# Patient Record
Sex: MALE | Race: BLACK OR AFRICAN AMERICAN | NOT HISPANIC OR LATINO | Employment: FULL TIME | URBAN - METROPOLITAN AREA
[De-identification: names, ages, dates, MRNs, and addresses within clinical notes are randomized per-mention and may not be internally consistent; named-entity substitution may affect disease eponyms.]

---

## 2017-07-16 ENCOUNTER — HOSPITAL ENCOUNTER (EMERGENCY)
Facility: HOSPITAL | Age: 62
Discharge: HOME | End: 2017-07-16
Attending: EMERGENCY MEDICINE

## 2017-07-16 VITALS
OXYGEN SATURATION: 97 % | SYSTOLIC BLOOD PRESSURE: 146 MMHG | WEIGHT: 200 LBS | TEMPERATURE: 98 F | BODY MASS INDEX: 25.67 KG/M2 | HEIGHT: 74 IN | HEART RATE: 73 BPM | RESPIRATION RATE: 17 BRPM | DIASTOLIC BLOOD PRESSURE: 94 MMHG

## 2017-07-16 DIAGNOSIS — R06.02 SOB (SHORTNESS OF BREATH): ICD-10-CM

## 2017-07-16 DIAGNOSIS — J44.1 COPD EXACERBATION: Primary | ICD-10-CM

## 2017-07-16 DIAGNOSIS — R05.9 COUGH: ICD-10-CM

## 2017-07-16 DIAGNOSIS — R93.89 ABNORMAL CHEST X-RAY: ICD-10-CM

## 2017-07-16 LAB
ALBUMIN SERPL BCP-MCNC: 3.9 G/DL
ALP SERPL-CCNC: 67 U/L
ALT SERPL W/O P-5'-P-CCNC: 24 U/L
ANION GAP SERPL CALC-SCNC: 8 MMOL/L
AST SERPL-CCNC: 24 U/L
BASOPHILS # BLD AUTO: 0.03 K/UL
BASOPHILS NFR BLD: 0.5 %
BILIRUB SERPL-MCNC: 0.5 MG/DL
BNP SERPL-MCNC: 13 PG/ML
BUN SERPL-MCNC: 18 MG/DL
CALCIUM SERPL-MCNC: 9.7 MG/DL
CHLORIDE SERPL-SCNC: 108 MMOL/L
CO2 SERPL-SCNC: 26 MMOL/L
CREAT SERPL-MCNC: 1.2 MG/DL
DIFFERENTIAL METHOD: NORMAL
EOSINOPHIL # BLD AUTO: 0.4 K/UL
EOSINOPHIL NFR BLD: 5.7 %
ERYTHROCYTE [DISTWIDTH] IN BLOOD BY AUTOMATED COUNT: 13.3 %
EST. GFR  (AFRICAN AMERICAN): >60 ML/MIN/1.73 M^2
EST. GFR  (NON AFRICAN AMERICAN): >60 ML/MIN/1.73 M^2
GLUCOSE SERPL-MCNC: 102 MG/DL
HCT VFR BLD AUTO: 44.9 %
HGB BLD-MCNC: 14.7 G/DL
LYMPHOCYTES # BLD AUTO: 2.5 K/UL
LYMPHOCYTES NFR BLD: 40 %
MCH RBC QN AUTO: 29.2 PG
MCHC RBC AUTO-ENTMCNC: 32.7 %
MCV RBC AUTO: 89 FL
MONOCYTES # BLD AUTO: 0.5 K/UL
MONOCYTES NFR BLD: 7.3 %
NEUTROPHILS # BLD AUTO: 2.8 K/UL
NEUTROPHILS NFR BLD: 46.3 %
PLATELET # BLD AUTO: 261 K/UL
PMV BLD AUTO: 11.3 FL
POTASSIUM SERPL-SCNC: 4.4 MMOL/L
PROT SERPL-MCNC: 6.9 G/DL
RBC # BLD AUTO: 5.04 M/UL
SODIUM SERPL-SCNC: 142 MMOL/L
TROPONIN I SERPL DL<=0.01 NG/ML-MCNC: 0.02 NG/ML
WBC # BLD AUTO: 6.13 K/UL

## 2017-07-16 PROCEDURE — 80053 COMPREHEN METABOLIC PANEL: CPT

## 2017-07-16 PROCEDURE — 85025 COMPLETE CBC W/AUTO DIFF WBC: CPT

## 2017-07-16 PROCEDURE — 25000242 PHARM REV CODE 250 ALT 637 W/ HCPCS: Performed by: EMERGENCY MEDICINE

## 2017-07-16 PROCEDURE — 93010 ELECTROCARDIOGRAM REPORT: CPT | Mod: ,,, | Performed by: INTERNAL MEDICINE

## 2017-07-16 PROCEDURE — 93005 ELECTROCARDIOGRAM TRACING: CPT

## 2017-07-16 PROCEDURE — 96374 THER/PROPH/DIAG INJ IV PUSH: CPT

## 2017-07-16 PROCEDURE — 99284 EMERGENCY DEPT VISIT MOD MDM: CPT | Mod: 25

## 2017-07-16 PROCEDURE — 83880 ASSAY OF NATRIURETIC PEPTIDE: CPT

## 2017-07-16 PROCEDURE — 94640 AIRWAY INHALATION TREATMENT: CPT

## 2017-07-16 PROCEDURE — 84484 ASSAY OF TROPONIN QUANT: CPT

## 2017-07-16 PROCEDURE — 63600175 PHARM REV CODE 636 W HCPCS: Performed by: NURSE PRACTITIONER

## 2017-07-16 RX ORDER — BUDESONIDE 0.5 MG/2ML
0.5 INHALANT ORAL EVERY 12 HOURS
Qty: 120 ML | Refills: 0 | Status: SHIPPED | OUTPATIENT
Start: 2017-07-16 | End: 2018-01-01 | Stop reason: CLARIF

## 2017-07-16 RX ORDER — IPRATROPIUM BROMIDE AND ALBUTEROL SULFATE 2.5; .5 MG/3ML; MG/3ML
3 SOLUTION RESPIRATORY (INHALATION) EVERY 6 HOURS PRN
Qty: 20 VIAL | Refills: 0 | Status: SHIPPED | OUTPATIENT
Start: 2017-07-16 | End: 2018-01-01 | Stop reason: CLARIF

## 2017-07-16 RX ORDER — IPRATROPIUM BROMIDE AND ALBUTEROL SULFATE 2.5; .5 MG/3ML; MG/3ML
3 SOLUTION RESPIRATORY (INHALATION)
Status: COMPLETED | OUTPATIENT
Start: 2017-07-16 | End: 2017-07-16

## 2017-07-16 RX ORDER — METHYLPREDNISOLONE SOD SUCC 125 MG
125 VIAL (EA) INJECTION
Status: COMPLETED | OUTPATIENT
Start: 2017-07-16 | End: 2017-07-16

## 2017-07-16 RX ORDER — ALBUTEROL SULFATE 90 UG/1
1-2 AEROSOL, METERED RESPIRATORY (INHALATION) EVERY 6 HOURS PRN
Qty: 1 INHALER | Refills: 0 | Status: ON HOLD | OUTPATIENT
Start: 2017-07-16 | End: 2018-01-01

## 2017-07-16 RX ADMIN — IPRATROPIUM BROMIDE AND ALBUTEROL SULFATE 3 ML: .5; 3 SOLUTION RESPIRATORY (INHALATION) at 12:07

## 2017-07-16 RX ADMIN — METHYLPREDNISOLONE SODIUM SUCCINATE 125 MG: 125 INJECTION, POWDER, FOR SOLUTION INTRAMUSCULAR; INTRAVENOUS at 01:07

## 2017-07-16 NOTE — ED PROVIDER NOTES
Encounter Date: 7/16/2017       History     Chief Complaint   Patient presents with    Cough     cough at night with increased SOB.  Hx of emphysema.  Denies fevers     61-year-old male past medical history of COPD, hypertension, emphysema is here for shortness of breath.  The patient reports that he noticed worsening shortness of breath today, and he is out of his home inhalers.  He reports that he has been having to use his inhalers more frequently than usual.  He reports a nonproductive cough.  No fever/chills, sore throat, nasal congestion, chest pain, abdominal pain, vomiting, or rash.  No known sick contacts.  No home oxygen use.  He reports that he does still occasionally smokes.  He reports his cough is worse at night.  No relieving factors.      The history is provided by the patient.   Shortness of Breath   This is a new problem. The average episode lasts 1 day. The problem occurs continuously.The current episode started 3 to 5 hours ago. The problem has not changed since onset.Associated symptoms include cough and wheezing. Pertinent negatives include no fever, no headaches, no rhinorrhea, no sore throat, no sputum production, no hemoptysis, no orthopnea, no chest pain, no vomiting, no abdominal pain, no rash and no leg swelling. The problem's precipitants include smoke. He has tried beta-agonist inhalers for the symptoms. The treatment provided no relief. He has had prior hospitalizations. He has had prior ED visits. He has had prior ICU admissions. Associated medical issues include COPD and chronic lung disease. Associated medical issues do not include CAD, heart failure or past MI.     Review of patient's allergies indicates:  No Known Allergies  Past Medical History:   Diagnosis Date    COPD (chronic obstructive pulmonary disease)     Hypertension     Reactive airway disease 12/2/2014     History reviewed. No pertinent surgical history.  Family History   Problem Relation Age of Onset    Heart  disease Mother     Heart disease Father      Social History   Substance Use Topics    Smoking status: Current Every Day Smoker     Packs/day: 0.50     Years: 40.00     Types: Cigarettes    Smokeless tobacco: Never Used    Alcohol use No     Review of Systems   Constitutional: Negative for activity change, chills and fever.   HENT: Negative for congestion, rhinorrhea and sore throat.    Respiratory: Positive for cough, shortness of breath and wheezing. Negative for hemoptysis, sputum production and chest tightness.    Cardiovascular: Negative for chest pain, orthopnea and leg swelling.   Gastrointestinal: Negative for abdominal pain and vomiting.   Genitourinary: Negative for dysuria.   Musculoskeletal: Negative for back pain.   Skin: Negative for rash.   Allergic/Immunologic: Negative for immunocompromised state.   Neurological: Negative for headaches.   Psychiatric/Behavioral: Negative for confusion.       Physical Exam     Initial Vitals [07/16/17 1218]   BP Pulse Resp Temp SpO2   (!) 155/96 84 18 97.6 °F (36.4 °C) 98 %      MAP       115.67         Physical Exam    Nursing note and vitals reviewed.  Constitutional: Vital signs are normal. He appears well-developed and well-nourished. He is active and cooperative. He is easily aroused.  Non-toxic appearance. He does not have a sickly appearance. He does not appear ill. No distress.   HENT:   Head: Normocephalic and atraumatic.   Mouth/Throat: Uvula is midline and oropharynx is clear and moist.   Eyes: Conjunctivae are normal.   Neck: Normal range of motion.   Cardiovascular: Normal rate, regular rhythm and normal heart sounds.   Pulses:       Radial pulses are 2+ on the right side, and 2+ on the left side.   Pulmonary/Chest: Effort normal. No accessory muscle usage. No respiratory distress. He has wheezes (diffuse inspiratory and expiratory).   Abdominal: Soft. Normal appearance and bowel sounds are normal. There is no tenderness. There is no guarding.    Musculoskeletal:        Right lower leg: Normal.        Left lower leg: Normal.   Neurological: He is alert, oriented to person, place, and time and easily aroused. GCS eye subscore is 4. GCS verbal subscore is 5. GCS motor subscore is 6.   Skin: Skin is warm, dry and intact. No bruising and no rash noted.   Psychiatric: He has a normal mood and affect. His speech is normal and behavior is normal. Judgment and thought content normal. Cognition and memory are normal.         ED Course   Procedures  Labs Reviewed   CBC W/ AUTO DIFFERENTIAL   B-TYPE NATRIURETIC PEPTIDE   COMPREHENSIVE METABOLIC PANEL   TROPONIN I         Imaging Results          X-Ray Chest PA And Lateral (Final result)  Result time 07/16/17 13:02:54    Final result by Handy Rayo DO (07/16/17 13:02:54)                 Impression:      See above      Electronically signed by: HANDY RAYO DO  Date:     07/16/17  Time:    13:02              Narrative:    PA and lateral views of the chest    Comparison: 12/13/2015    Results:    Sub-centimeter nodule opacity overlies the left lower lobe overlapping the sixth rib anteriorly which may represent artifact from superimposed structures. Nipple shadow or lung nodule not excluded. This could be further evaluated with repeat views with nipple marker in place. There is no large lung consolidation. No pleural effusion or pneumothorax. Continued atherosclerotic aorta. Size within normal limits. Visualized osseous structures grossly intact.                                   Medical Decision Making:   Initial Assessment:   61-year-old male here for shortness of breath.  He does have a history of COPD and emphysema.  He reports a productive cough which is worse at night.  No fever/chills, chest pain, palpitations, hemoptysis, abdominal pain, vomiting, or rash.  The patient appears well, nontoxic.  Vital stable.  Diffuse expiratory and expiratory wheezing bilaterally.  Comfortable work of breathing.  Heart rate  regular rate and rhythm.  Differential Diagnosis:   COPD exacerbation, pneumonia, CHF, arrhythmia, STEMI, STEMI, worsening emphysema, electrolyte derangement, dehydration, or pleural effusion  Clinical Tests:   Lab Tests: Ordered and Reviewed  Radiological Study: Ordered and Reviewed  Medical Tests: Reviewed and Ordered  ED Management:  Labs, EKG, chest x-ray, DuoNeb, IV Solu-Medrol  X-ray negative for infiltrate.  There is a possible subcentimeter nodule overlying the left lower lobe which was discussed with the patient.  I advised follow up with [facility].  Patient reports he feels much better after his DuoNeb treatment.  I feel the patient's symptoms were due to the mild COPD exacerbation.  There is no indication for antibiotics.  I will prescribe him his home inhalers and add DuoNeb solutions to use with his nebulizer.  I reviewed strict return precautions.  The patient verbalized understanding, compliance, and agreement with the treatment plan.  He feels comfortable with the discharge.   R axis DuoNeb solution, Pulmicort, and albuterol.                 ED Course     Clinical Impression:   The primary encounter diagnosis was COPD exacerbation. Diagnoses of Cough, SOB (shortness of breath), and Abnormal chest x-ray were also pertinent to this visit.                           Jennifer Michel, TEGAN  07/16/17 1527

## 2017-07-16 NOTE — ED NOTES
Pt reports that he has emphysema and has been SOB since this morning. Usually uses an inhaler, but states that he ran out and does not have a PCP. Denies CP, N/V, headache, and vision changes. Pt appears in mild respiratory distress. O2 sat on RA is 98%.    APPEARANCE: Alert, oriented.  CARDIAC: Normal rate and rhythm.  PERIPHERAL VASCULAR: peripheral pulses present. Normal cap refill. No edema. Warm to touch.    RESPIRATORY:SOB, mild respiratory distress  GASTRO: soft, no tenderness, no abdominal distention.  MUSC: Full ROM. No bony tenderness or soft tissue tenderness. No obvious deformity.  SKIN: Skin is warm and dry, normal skin turgor.  NEURO: 5/5 strength major flexors/extensors bilaterally. [place] coma scale: eyes open spontaneously-4, oriented & converses-5, obeys commands-6. No neurological abnormalities.   MENTAL STATUS: awake, alert and aware of environment.

## 2018-01-01 ENCOUNTER — HOSPITAL ENCOUNTER (EMERGENCY)
Facility: HOSPITAL | Age: 63
Discharge: HOME | End: 2018-12-31
Attending: EMERGENCY MEDICINE
Payer: MEDICAID

## 2018-01-01 ENCOUNTER — CLINICAL SUPPORT (OUTPATIENT)
Dept: SMOKING CESSATION | Facility: CLINIC | Age: 63
End: 2018-01-01
Payer: COMMERCIAL

## 2018-01-01 ENCOUNTER — HOSPITAL ENCOUNTER (INPATIENT)
Facility: HOSPITAL | Age: 63
LOS: 7 days | Discharge: HOME | DRG: 189 | End: 2018-10-18
Attending: EMERGENCY MEDICINE | Admitting: HOSPITALIST
Payer: MEDICAID

## 2018-01-01 ENCOUNTER — HOSPITAL ENCOUNTER (EMERGENCY)
Facility: HOSPITAL | Age: 63
Discharge: HOME | End: 2018-08-12
Attending: EMERGENCY MEDICINE
Payer: MEDICAID

## 2018-01-01 ENCOUNTER — TELEPHONE (OUTPATIENT)
Dept: PHARMACY | Facility: CLINIC | Age: 63
End: 2018-01-01

## 2018-01-01 ENCOUNTER — HOSPITAL ENCOUNTER (INPATIENT)
Facility: HOSPITAL | Age: 63
LOS: 1 days | Discharge: HOME | DRG: 190 | End: 2018-06-26
Attending: EMERGENCY MEDICINE | Admitting: HOSPITALIST
Payer: MEDICAID

## 2018-01-01 VITALS
SYSTOLIC BLOOD PRESSURE: 173 MMHG | WEIGHT: 184.5 LBS | OXYGEN SATURATION: 99 % | RESPIRATION RATE: 16 BRPM | DIASTOLIC BLOOD PRESSURE: 93 MMHG | HEIGHT: 72 IN | TEMPERATURE: 99 F | BODY MASS INDEX: 24.99 KG/M2 | HEART RATE: 91 BPM

## 2018-01-01 VITALS
SYSTOLIC BLOOD PRESSURE: 141 MMHG | OXYGEN SATURATION: 93 % | HEART RATE: 97 BPM | TEMPERATURE: 97 F | RESPIRATION RATE: 20 BRPM | HEIGHT: 74 IN | BODY MASS INDEX: 22.63 KG/M2 | DIASTOLIC BLOOD PRESSURE: 98 MMHG | WEIGHT: 176.38 LBS

## 2018-01-01 VITALS
WEIGHT: 190 LBS | BODY MASS INDEX: 24.38 KG/M2 | HEART RATE: 95 BPM | OXYGEN SATURATION: 94 % | TEMPERATURE: 98 F | SYSTOLIC BLOOD PRESSURE: 127 MMHG | RESPIRATION RATE: 20 BRPM | HEIGHT: 74 IN | DIASTOLIC BLOOD PRESSURE: 93 MMHG

## 2018-01-01 VITALS
OXYGEN SATURATION: 98 % | HEIGHT: 74 IN | TEMPERATURE: 100 F | BODY MASS INDEX: 26.31 KG/M2 | SYSTOLIC BLOOD PRESSURE: 140 MMHG | HEART RATE: 102 BPM | WEIGHT: 205 LBS | DIASTOLIC BLOOD PRESSURE: 88 MMHG | RESPIRATION RATE: 16 BRPM

## 2018-01-01 DIAGNOSIS — Z72.0 TOBACCO ABUSE: ICD-10-CM

## 2018-01-01 DIAGNOSIS — J44.1 CHRONIC OBSTRUCTIVE PULMONARY DISEASE WITH ACUTE EXACERBATION: ICD-10-CM

## 2018-01-01 DIAGNOSIS — F17.210 CIGARETTE SMOKER: Primary | ICD-10-CM

## 2018-01-01 DIAGNOSIS — J44.1 COPD EXACERBATION: Primary | ICD-10-CM

## 2018-01-01 DIAGNOSIS — J45.901 REACTIVE AIRWAY DISEASE WITH ACUTE EXACERBATION, UNSPECIFIED ASTHMA SEVERITY, UNSPECIFIED WHETHER PERSISTENT: Chronic | ICD-10-CM

## 2018-01-01 DIAGNOSIS — R06.02 SOB (SHORTNESS OF BREATH): ICD-10-CM

## 2018-01-01 DIAGNOSIS — I21.4 NSTEMI (NON-ST ELEVATED MYOCARDIAL INFARCTION): ICD-10-CM

## 2018-01-01 DIAGNOSIS — R06.02 SOB (SHORTNESS OF BREATH): Primary | ICD-10-CM

## 2018-01-01 DIAGNOSIS — R07.9 CHEST PAIN IN ADULT: ICD-10-CM

## 2018-01-01 DIAGNOSIS — J02.0 STREP PHARYNGITIS: Primary | ICD-10-CM

## 2018-01-01 LAB
ALBUMIN SERPL BCP-MCNC: 4 G/DL
ALBUMIN SERPL BCP-MCNC: 4.1 G/DL
ALBUMIN SERPL BCP-MCNC: 4.2 G/DL
ALLENS TEST: ABNORMAL
ALP SERPL-CCNC: 65 U/L
ALP SERPL-CCNC: 68 U/L
ALP SERPL-CCNC: 71 U/L
ALT SERPL W/O P-5'-P-CCNC: 14 U/L
ALT SERPL W/O P-5'-P-CCNC: 18 U/L
ALT SERPL W/O P-5'-P-CCNC: 21 U/L
ANION GAP SERPL CALC-SCNC: 10 MMOL/L
ANION GAP SERPL CALC-SCNC: 13 MMOL/L
ANION GAP SERPL CALC-SCNC: 6 MMOL/L
ANION GAP SERPL CALC-SCNC: 6 MMOL/L
ANION GAP SERPL CALC-SCNC: 9 MMOL/L
AST SERPL-CCNC: 16 U/L
AST SERPL-CCNC: 21 U/L
AST SERPL-CCNC: 22 U/L
BACTERIA BLD CULT: NORMAL
BACTERIA BLD CULT: NORMAL
BACTERIA SPEC AEROBE CULT: NORMAL
BACTERIA SPEC AEROBE CULT: NORMAL
BASOPHILS # BLD AUTO: 0.01 K/UL
BASOPHILS # BLD AUTO: 0.02 K/UL
BASOPHILS # BLD AUTO: 0.03 K/UL
BASOPHILS # BLD AUTO: 0.03 K/UL
BASOPHILS # BLD AUTO: 0.04 K/UL
BASOPHILS NFR BLD: 0 %
BASOPHILS NFR BLD: 0.1 %
BASOPHILS NFR BLD: 0.2 %
BASOPHILS NFR BLD: 0.3 %
BASOPHILS NFR BLD: 0.4 %
BASOPHILS NFR BLD: 0.5 %
BILIRUB SERPL-MCNC: 0.3 MG/DL
BILIRUB SERPL-MCNC: 0.4 MG/DL
BILIRUB SERPL-MCNC: 0.6 MG/DL
BNP SERPL-MCNC: 14 PG/ML
BNP SERPL-MCNC: 15 PG/ML
BNP SERPL-MCNC: 31 PG/ML
BUN SERPL-MCNC: 11 MG/DL
BUN SERPL-MCNC: 12 MG/DL
BUN SERPL-MCNC: 13 MG/DL
BUN SERPL-MCNC: 24 MG/DL
BUN SERPL-MCNC: 24 MG/DL
BUN SERPL-MCNC: 28 MG/DL
BUN SERPL-MCNC: 28 MG/DL
BUN SERPL-MCNC: 34 MG/DL
CALCIUM SERPL-MCNC: 10.1 MG/DL
CALCIUM SERPL-MCNC: 10.2 MG/DL
CALCIUM SERPL-MCNC: 10.3 MG/DL
CALCIUM SERPL-MCNC: 10.3 MG/DL
CALCIUM SERPL-MCNC: 10.5 MG/DL
CALCIUM SERPL-MCNC: 10.5 MG/DL
CALCIUM SERPL-MCNC: 9.6 MG/DL
CALCIUM SERPL-MCNC: 9.8 MG/DL
CHLORIDE SERPL-SCNC: 105 MMOL/L
CHLORIDE SERPL-SCNC: 106 MMOL/L
CHLORIDE SERPL-SCNC: 107 MMOL/L
CHLORIDE SERPL-SCNC: 95 MMOL/L
CHLORIDE SERPL-SCNC: 96 MMOL/L
CHLORIDE SERPL-SCNC: 98 MMOL/L
CO2 SERPL-SCNC: 21 MMOL/L
CO2 SERPL-SCNC: 24 MMOL/L
CO2 SERPL-SCNC: 26 MMOL/L
CO2 SERPL-SCNC: 29 MMOL/L
CO2 SERPL-SCNC: 32 MMOL/L
CO2 SERPL-SCNC: 34 MMOL/L
CO2 SERPL-SCNC: 34 MMOL/L
CO2 SERPL-SCNC: 35 MMOL/L
CREAT SERPL-MCNC: 0.8 MG/DL
CREAT SERPL-MCNC: 1.1 MG/DL
CREAT SERPL-MCNC: 1.2 MG/DL
CREAT SERPL-MCNC: 1.3 MG/DL
DELSYS: ABNORMAL
DEPRECATED S PYO AG THROAT QL EIA: NEGATIVE
DIASTOLIC DYSFUNCTION: YES
DIFFERENTIAL METHOD: ABNORMAL
EOSINOPHIL # BLD AUTO: 0 K/UL
EOSINOPHIL # BLD AUTO: 0.1 K/UL
EOSINOPHIL # BLD AUTO: 0.2 K/UL
EOSINOPHIL # BLD AUTO: 0.6 K/UL
EOSINOPHIL # BLD AUTO: 0.9 K/UL
EOSINOPHIL NFR BLD: 0 %
EOSINOPHIL NFR BLD: 0.4 %
EOSINOPHIL NFR BLD: 11.7 %
EOSINOPHIL NFR BLD: 2.7 %
EOSINOPHIL NFR BLD: 9.6 %
EP: 5
ERYTHROCYTE [DISTWIDTH] IN BLOOD BY AUTOMATED COUNT: 13.5 %
ERYTHROCYTE [DISTWIDTH] IN BLOOD BY AUTOMATED COUNT: 13.5 %
ERYTHROCYTE [DISTWIDTH] IN BLOOD BY AUTOMATED COUNT: 13.7 %
ERYTHROCYTE [DISTWIDTH] IN BLOOD BY AUTOMATED COUNT: 13.7 %
ERYTHROCYTE [DISTWIDTH] IN BLOOD BY AUTOMATED COUNT: 13.8 %
ERYTHROCYTE [DISTWIDTH] IN BLOOD BY AUTOMATED COUNT: 13.9 %
ERYTHROCYTE [DISTWIDTH] IN BLOOD BY AUTOMATED COUNT: 14 %
ERYTHROCYTE [DISTWIDTH] IN BLOOD BY AUTOMATED COUNT: 14 %
ERYTHROCYTE [SEDIMENTATION RATE] IN BLOOD BY WESTERGREN METHOD: 4 MM/H
EST. GFR  (AFRICAN AMERICAN): >60 ML/MIN/1.73 M^2
EST. GFR  (NON AFRICAN AMERICAN): 59 ML/MIN/1.73 M^2
EST. GFR  (NON AFRICAN AMERICAN): >60 ML/MIN/1.73 M^2
ESTIMATED PA SYSTOLIC PRESSURE: 34.71
FIO2: 40
FLOW: 3
FLUAV AG SPEC QL IA: NEGATIVE
FLUBV AG SPEC QL IA: NEGATIVE
GLUCOSE SERPL-MCNC: 102 MG/DL
GLUCOSE SERPL-MCNC: 102 MG/DL
GLUCOSE SERPL-MCNC: 117 MG/DL
GLUCOSE SERPL-MCNC: 131 MG/DL
GLUCOSE SERPL-MCNC: 132 MG/DL
GLUCOSE SERPL-MCNC: 144 MG/DL
GLUCOSE SERPL-MCNC: 165 MG/DL
GLUCOSE SERPL-MCNC: 169 MG/DL
GRAM STN SPEC: NORMAL
HCO3 UR-SCNC: 24.6 MMOL/L (ref 24–28)
HCO3 UR-SCNC: 25.8 MMOL/L (ref 24–28)
HCO3 UR-SCNC: 26.8 MMOL/L (ref 24–28)
HCO3 UR-SCNC: 28.7 MMOL/L (ref 24–28)
HCT VFR BLD AUTO: 46.2 %
HCT VFR BLD AUTO: 46.4 %
HCT VFR BLD AUTO: 46.7 %
HCT VFR BLD AUTO: 48.6 %
HCT VFR BLD AUTO: 49.5 %
HCT VFR BLD AUTO: 50.2 %
HCT VFR BLD AUTO: 50.9 %
HCT VFR BLD AUTO: 51.5 %
HGB BLD-MCNC: 15.3 G/DL
HGB BLD-MCNC: 15.4 G/DL
HGB BLD-MCNC: 15.4 G/DL
HGB BLD-MCNC: 15.6 G/DL
HGB BLD-MCNC: 16.3 G/DL
HGB BLD-MCNC: 16.3 G/DL
HGB BLD-MCNC: 16.4 G/DL
HGB BLD-MCNC: 16.6 G/DL
INR PPP: 0.9
IP: 10
LACTATE SERPL-SCNC: 1 MMOL/L
LYMPHOCYTES # BLD AUTO: 0.5 K/UL
LYMPHOCYTES # BLD AUTO: 0.7 K/UL
LYMPHOCYTES # BLD AUTO: 0.7 K/UL
LYMPHOCYTES # BLD AUTO: 0.9 K/UL
LYMPHOCYTES # BLD AUTO: 1.3 K/UL
LYMPHOCYTES # BLD AUTO: 1.9 K/UL
LYMPHOCYTES # BLD AUTO: 2.5 K/UL
LYMPHOCYTES # BLD AUTO: 3.7 K/UL
LYMPHOCYTES NFR BLD: 2.1 %
LYMPHOCYTES NFR BLD: 22.4 %
LYMPHOCYTES NFR BLD: 31.1 %
LYMPHOCYTES NFR BLD: 32.7 %
LYMPHOCYTES NFR BLD: 4.3 %
LYMPHOCYTES NFR BLD: 6.6 %
LYMPHOCYTES NFR BLD: 6.9 %
LYMPHOCYTES NFR BLD: 8.6 %
MAGNESIUM SERPL-MCNC: 2 MG/DL
MAGNESIUM SERPL-MCNC: 2.1 MG/DL
MAGNESIUM SERPL-MCNC: 2.3 MG/DL
MAGNESIUM SERPL-MCNC: 2.6 MG/DL
MAGNESIUM SERPL-MCNC: 2.7 MG/DL
MCH RBC QN AUTO: 28.7 PG
MCH RBC QN AUTO: 28.8 PG
MCH RBC QN AUTO: 28.9 PG
MCH RBC QN AUTO: 29.1 PG
MCH RBC QN AUTO: 29.1 PG
MCH RBC QN AUTO: 29.3 PG
MCH RBC QN AUTO: 29.3 PG
MCH RBC QN AUTO: 29.6 PG
MCHC RBC AUTO-ENTMCNC: 31.8 G/DL
MCHC RBC AUTO-ENTMCNC: 32.1 G/DL
MCHC RBC AUTO-ENTMCNC: 32.5 G/DL
MCHC RBC AUTO-ENTMCNC: 32.6 G/DL
MCHC RBC AUTO-ENTMCNC: 32.8 G/DL
MCHC RBC AUTO-ENTMCNC: 32.9 G/DL
MCHC RBC AUTO-ENTMCNC: 33.2 G/DL
MCHC RBC AUTO-ENTMCNC: 33.3 G/DL
MCV RBC AUTO: 87 FL
MCV RBC AUTO: 88 FL
MCV RBC AUTO: 89 FL
MCV RBC AUTO: 90 FL
MCV RBC AUTO: 90 FL
MCV RBC AUTO: 91 FL
MITRAL VALVE MOBILITY: NORMAL
MODE: ABNORMAL
MODE: ABNORMAL
MONOCYTES # BLD AUTO: 0.3 K/UL
MONOCYTES # BLD AUTO: 0.4 K/UL
MONOCYTES # BLD AUTO: 0.5 K/UL
MONOCYTES # BLD AUTO: 0.6 K/UL
MONOCYTES # BLD AUTO: 0.6 K/UL
MONOCYTES # BLD AUTO: 0.7 K/UL
MONOCYTES # BLD AUTO: 0.8 K/UL
MONOCYTES # BLD AUTO: 1.3 K/UL
MONOCYTES NFR BLD: 1.3 %
MONOCYTES NFR BLD: 2.5 %
MONOCYTES NFR BLD: 4.8 %
MONOCYTES NFR BLD: 5.3 %
MONOCYTES NFR BLD: 6.4 %
MONOCYTES NFR BLD: 7.1 %
MONOCYTES NFR BLD: 7.5 %
MONOCYTES NFR BLD: 7.7 %
NEUTROPHILS # BLD AUTO: 11.1 K/UL
NEUTROPHILS # BLD AUTO: 11.8 K/UL
NEUTROPHILS # BLD AUTO: 14.4 K/UL
NEUTROPHILS # BLD AUTO: 18.1 K/UL
NEUTROPHILS # BLD AUTO: 23.9 K/UL
NEUTROPHILS # BLD AUTO: 3.1 K/UL
NEUTROPHILS # BLD AUTO: 3.6 K/UL
NEUTROPHILS # BLD AUTO: 6.9 K/UL
NEUTROPHILS NFR BLD: 47.7 %
NEUTROPHILS NFR BLD: 52.3 %
NEUTROPHILS NFR BLD: 67.4 %
NEUTROPHILS NFR BLD: 80.7 %
NEUTROPHILS NFR BLD: 86.6 %
NEUTROPHILS NFR BLD: 90.4 %
NEUTROPHILS NFR BLD: 91.7 %
NEUTROPHILS NFR BLD: 95.4 %
PCO2 BLDA: 47.3 MMHG (ref 35–45)
PCO2 BLDA: 47.5 MMHG (ref 35–45)
PCO2 BLDA: 49.8 MMHG (ref 35–45)
PCO2 BLDA: 93.2 MMHG (ref 35–45)
PH SMN: 7.1 [PH] (ref 7.35–7.45)
PH SMN: 7.32 [PH] (ref 7.35–7.45)
PH SMN: 7.32 [PH] (ref 7.35–7.45)
PH SMN: 7.36 [PH] (ref 7.35–7.45)
PHOSPHATE SERPL-MCNC: 3.1 MG/DL
PHOSPHATE SERPL-MCNC: 3.1 MG/DL
PHOSPHATE SERPL-MCNC: 3.4 MG/DL
PHOSPHATE SERPL-MCNC: 3.7 MG/DL
PHOSPHATE SERPL-MCNC: 3.9 MG/DL
PLATELET # BLD AUTO: 271 K/UL
PLATELET # BLD AUTO: 271 K/UL
PLATELET # BLD AUTO: 282 K/UL
PLATELET # BLD AUTO: 301 K/UL
PLATELET # BLD AUTO: 312 K/UL
PLATELET # BLD AUTO: 318 K/UL
PLATELET # BLD AUTO: 323 K/UL
PLATELET # BLD AUTO: 343 K/UL
PMV BLD AUTO: 10.8 FL
PMV BLD AUTO: 10.9 FL
PMV BLD AUTO: 10.9 FL
PMV BLD AUTO: 11.4 FL
PMV BLD AUTO: 11.5 FL
PMV BLD AUTO: 11.7 FL
PMV BLD AUTO: 11.8 FL
PMV BLD AUTO: 12.2 FL
PO2 BLDA: 103 MMHG (ref 80–100)
PO2 BLDA: 164 MMHG (ref 80–100)
PO2 BLDA: 48 MMHG (ref 80–100)
PO2 BLDA: 66 MMHG (ref 80–100)
POC BE: -1 MMOL/L
POC BE: -1 MMOL/L
POC BE: 0 MMOL/L
POC BE: 1 MMOL/L
POC SATURATED O2: 80 % (ref 95–100)
POC SATURATED O2: 91 % (ref 95–100)
POC SATURATED O2: 94 % (ref 95–100)
POC SATURATED O2: 99 % (ref 95–100)
POC TCO2: 26 MMOL/L (ref 23–27)
POC TCO2: 27 MMOL/L (ref 23–27)
POC TCO2: 28 MMOL/L (ref 23–27)
POC TCO2: 32 MMOL/L (ref 23–27)
POTASSIUM SERPL-SCNC: 3.5 MMOL/L
POTASSIUM SERPL-SCNC: 3.8 MMOL/L
POTASSIUM SERPL-SCNC: 4.1 MMOL/L
POTASSIUM SERPL-SCNC: 4.2 MMOL/L
POTASSIUM SERPL-SCNC: 4.4 MMOL/L
POTASSIUM SERPL-SCNC: 4.8 MMOL/L
PROT SERPL-MCNC: 7.4 G/DL
PROT SERPL-MCNC: 7.5 G/DL
PROT SERPL-MCNC: 7.8 G/DL
PROTHROMBIN TIME: 10 SEC
RBC # BLD AUTO: 5.21 M/UL
RBC # BLD AUTO: 5.3 M/UL
RBC # BLD AUTO: 5.33 M/UL
RBC # BLD AUTO: 5.36 M/UL
RBC # BLD AUTO: 5.56 M/UL
RBC # BLD AUTO: 5.6 M/UL
RBC # BLD AUTO: 5.7 M/UL
RBC # BLD AUTO: 5.71 M/UL
RETIRED EF AND QEF - SEE NOTES: 60 (ref 55–65)
SAMPLE: ABNORMAL
SITE: ABNORMAL
SODIUM SERPL-SCNC: 135 MMOL/L
SODIUM SERPL-SCNC: 136 MMOL/L
SODIUM SERPL-SCNC: 137 MMOL/L
SODIUM SERPL-SCNC: 137 MMOL/L
SODIUM SERPL-SCNC: 138 MMOL/L
SODIUM SERPL-SCNC: 139 MMOL/L
SODIUM SERPL-SCNC: 140 MMOL/L
SODIUM SERPL-SCNC: 141 MMOL/L
SP02: 97
SPECIMEN SOURCE: NORMAL
SPONT RATE: 20
TRICUSPID VALVE REGURGITATION: ABNORMAL
TROPONIN I SERPL DL<=0.01 NG/ML-MCNC: 0.01 NG/ML
TROPONIN I SERPL DL<=0.01 NG/ML-MCNC: 0.02 NG/ML
TROPONIN I SERPL DL<=0.01 NG/ML-MCNC: 0.03 NG/ML
TROPONIN I SERPL DL<=0.01 NG/ML-MCNC: 0.04 NG/ML
TROPONIN I SERPL DL<=0.01 NG/ML-MCNC: 0.09 NG/ML
WBC # BLD AUTO: 13.66 K/UL
WBC # BLD AUTO: 15.91 K/UL
WBC # BLD AUTO: 16.37 K/UL
WBC # BLD AUTO: 19.73 K/UL
WBC # BLD AUTO: 25.09 K/UL
WBC # BLD AUTO: 5.95 K/UL
WBC # BLD AUTO: 7.58 K/UL
WBC # BLD AUTO: 8.5 K/UL

## 2018-01-01 PROCEDURE — 11000001 HC ACUTE MED/SURG PRIVATE ROOM

## 2018-01-01 PROCEDURE — 63600175 PHARM REV CODE 636 W HCPCS: Performed by: HOSPITALIST

## 2018-01-01 PROCEDURE — 80048 BASIC METABOLIC PNL TOTAL CA: CPT

## 2018-01-01 PROCEDURE — 94640 AIRWAY INHALATION TREATMENT: CPT

## 2018-01-01 PROCEDURE — 25000003 PHARM REV CODE 250: Performed by: INTERNAL MEDICINE

## 2018-01-01 PROCEDURE — 85025 COMPLETE CBC W/AUTO DIFF WBC: CPT

## 2018-01-01 PROCEDURE — 94660 CPAP INITIATION&MGMT: CPT

## 2018-01-01 PROCEDURE — 27000221 HC OXYGEN, UP TO 24 HOURS

## 2018-01-01 PROCEDURE — 93010 EKG 12-LEAD: ICD-10-PCS | Mod: ,,, | Performed by: INTERNAL MEDICINE

## 2018-01-01 PROCEDURE — 25000003 PHARM REV CODE 250: Performed by: HOSPITALIST

## 2018-01-01 PROCEDURE — 87205 SMEAR GRAM STAIN: CPT

## 2018-01-01 PROCEDURE — 25000242 PHARM REV CODE 250 ALT 637 W/ HCPCS: Performed by: HOSPITALIST

## 2018-01-01 PROCEDURE — 84484 ASSAY OF TROPONIN QUANT: CPT

## 2018-01-01 PROCEDURE — 80053 COMPREHEN METABOLIC PANEL: CPT

## 2018-01-01 PROCEDURE — 25000003 PHARM REV CODE 250: Performed by: PHYSICIAN ASSISTANT

## 2018-01-01 PROCEDURE — 27000190 HC CPAP FULL FACE MASK W/VALVE

## 2018-01-01 PROCEDURE — S4991 NICOTINE PATCH NONLEGEND: HCPCS | Performed by: HOSPITALIST

## 2018-01-01 PROCEDURE — 36415 COLL VENOUS BLD VENIPUNCTURE: CPT

## 2018-01-01 PROCEDURE — 93010 ELECTROCARDIOGRAM REPORT: CPT | Mod: ,,, | Performed by: INTERNAL MEDICINE

## 2018-01-01 PROCEDURE — 96374 THER/PROPH/DIAG INJ IV PUSH: CPT

## 2018-01-01 PROCEDURE — 83735 ASSAY OF MAGNESIUM: CPT

## 2018-01-01 PROCEDURE — 25000242 PHARM REV CODE 250 ALT 637 W/ HCPCS: Performed by: EMERGENCY MEDICINE

## 2018-01-01 PROCEDURE — 83880 ASSAY OF NATRIURETIC PEPTIDE: CPT

## 2018-01-01 PROCEDURE — 82803 BLOOD GASES ANY COMBINATION: CPT

## 2018-01-01 PROCEDURE — 93005 ELECTROCARDIOGRAM TRACING: CPT

## 2018-01-01 PROCEDURE — 20000000 HC ICU ROOM

## 2018-01-01 PROCEDURE — 63600175 PHARM REV CODE 636 W HCPCS: Performed by: EMERGENCY MEDICINE

## 2018-01-01 PROCEDURE — 3E02340 INTRODUCTION OF INFLUENZA VACCINE INTO MUSCLE, PERCUTANEOUS APPROACH: ICD-10-PCS | Performed by: EMERGENCY MEDICINE

## 2018-01-01 PROCEDURE — 87081 CULTURE SCREEN ONLY: CPT

## 2018-01-01 PROCEDURE — 99285 EMERGENCY DEPT VISIT HI MDM: CPT | Mod: 25

## 2018-01-01 PROCEDURE — 25000242 PHARM REV CODE 250 ALT 637 W/ HCPCS: Performed by: PHYSICIAN ASSISTANT

## 2018-01-01 PROCEDURE — 63600175 PHARM REV CODE 636 W HCPCS: Performed by: STUDENT IN AN ORGANIZED HEALTH CARE EDUCATION/TRAINING PROGRAM

## 2018-01-01 PROCEDURE — 84100 ASSAY OF PHOSPHORUS: CPT

## 2018-01-01 PROCEDURE — 84484 ASSAY OF TROPONIN QUANT: CPT | Mod: 91

## 2018-01-01 PROCEDURE — 99900035 HC TECH TIME PER 15 MIN (STAT)

## 2018-01-01 PROCEDURE — 93306 TTE W/DOPPLER COMPLETE: CPT

## 2018-01-01 PROCEDURE — 99284 EMERGENCY DEPT VISIT MOD MDM: CPT | Mod: 25

## 2018-01-01 PROCEDURE — 36600 WITHDRAWAL OF ARTERIAL BLOOD: CPT

## 2018-01-01 PROCEDURE — 87070 CULTURE OTHR SPECIMN AEROBIC: CPT

## 2018-01-01 PROCEDURE — 25000242 PHARM REV CODE 250 ALT 637 W/ HCPCS: Performed by: STUDENT IN AN ORGANIZED HEALTH CARE EDUCATION/TRAINING PROGRAM

## 2018-01-01 PROCEDURE — 94644 CONT INHLJ TX 1ST HOUR: CPT

## 2018-01-01 PROCEDURE — 94761 N-INVAS EAR/PLS OXIMETRY MLT: CPT

## 2018-01-01 PROCEDURE — 63600175 PHARM REV CODE 636 W HCPCS: Mod: JG | Performed by: NURSE PRACTITIONER

## 2018-01-01 PROCEDURE — 87040 BLOOD CULTURE FOR BACTERIA: CPT

## 2018-01-01 PROCEDURE — 96376 TX/PRO/DX INJ SAME DRUG ADON: CPT

## 2018-01-01 PROCEDURE — 99407 BEHAV CHNG SMOKING > 10 MIN: CPT | Mod: S$GLB,,,

## 2018-01-01 PROCEDURE — 63600175 PHARM REV CODE 636 W HCPCS: Performed by: INTERNAL MEDICINE

## 2018-01-01 PROCEDURE — 83605 ASSAY OF LACTIC ACID: CPT

## 2018-01-01 PROCEDURE — 90686 IIV4 VACC NO PRSV 0.5 ML IM: CPT | Performed by: EMERGENCY MEDICINE

## 2018-01-01 PROCEDURE — 63600175 PHARM REV CODE 636 W HCPCS

## 2018-01-01 PROCEDURE — 97803 MED NUTRITION INDIV SUBSEQ: CPT

## 2018-01-01 PROCEDURE — 96375 TX/PRO/DX INJ NEW DRUG ADDON: CPT

## 2018-01-01 PROCEDURE — 63600175 PHARM REV CODE 636 W HCPCS: Performed by: PHYSICIAN ASSISTANT

## 2018-01-01 PROCEDURE — G0378 HOSPITAL OBSERVATION PER HR: HCPCS

## 2018-01-01 PROCEDURE — 87400 INFLUENZA A/B EACH AG IA: CPT | Mod: 59

## 2018-01-01 PROCEDURE — 96372 THER/PROPH/DIAG INJ SC/IM: CPT

## 2018-01-01 PROCEDURE — 90471 IMMUNIZATION ADMIN: CPT | Performed by: EMERGENCY MEDICINE

## 2018-01-01 PROCEDURE — 87880 STREP A ASSAY W/OPTIC: CPT

## 2018-01-01 PROCEDURE — 93306 TTE W/DOPPLER COMPLETE: CPT | Mod: 26,,, | Performed by: INTERNAL MEDICINE

## 2018-01-01 PROCEDURE — 25000003 PHARM REV CODE 250: Performed by: EMERGENCY MEDICINE

## 2018-01-01 PROCEDURE — 87147 CULTURE TYPE IMMUNOLOGIC: CPT

## 2018-01-01 PROCEDURE — 85610 PROTHROMBIN TIME: CPT

## 2018-01-01 RX ORDER — HYDRALAZINE HYDROCHLORIDE 20 MG/ML
5 INJECTION INTRAMUSCULAR; INTRAVENOUS EVERY 8 HOURS PRN
Status: DISCONTINUED | OUTPATIENT
Start: 2018-01-01 | End: 2018-01-01

## 2018-01-01 RX ORDER — FLUTICASONE FUROATE AND VILANTEROL 200; 25 UG/1; UG/1
1 POWDER RESPIRATORY (INHALATION) DAILY
Status: DISCONTINUED | OUTPATIENT
Start: 2018-01-01 | End: 2018-01-01 | Stop reason: HOSPADM

## 2018-01-01 RX ORDER — ENOXAPARIN SODIUM 100 MG/ML
40 INJECTION SUBCUTANEOUS EVERY 24 HOURS
Status: DISCONTINUED | OUTPATIENT
Start: 2018-01-01 | End: 2018-01-01 | Stop reason: HOSPADM

## 2018-01-01 RX ORDER — AMLODIPINE BESYLATE 5 MG/1
10 TABLET ORAL DAILY
Status: DISCONTINUED | OUTPATIENT
Start: 2018-01-01 | End: 2018-01-01 | Stop reason: HOSPADM

## 2018-01-01 RX ORDER — HYDROCHLOROTHIAZIDE 25 MG/1
25 TABLET ORAL DAILY
Status: DISCONTINUED | OUTPATIENT
Start: 2018-01-01 | End: 2018-01-01 | Stop reason: HOSPADM

## 2018-01-01 RX ORDER — IBUPROFEN 200 MG
1 TABLET ORAL DAILY
Status: DISCONTINUED | OUTPATIENT
Start: 2018-01-01 | End: 2018-01-01 | Stop reason: HOSPADM

## 2018-01-01 RX ORDER — ALBUTEROL SULFATE 0.83 MG/ML
2.5 SOLUTION RESPIRATORY (INHALATION) EVERY 6 HOURS PRN
Qty: 1 BOX | Refills: 0 | Status: ON HOLD | OUTPATIENT
Start: 2018-01-01 | End: 2018-01-01 | Stop reason: SDUPTHER

## 2018-01-01 RX ORDER — MOXIFLOXACIN HYDROCHLORIDE 400 MG/1
400 TABLET ORAL DAILY
Status: DISCONTINUED | OUTPATIENT
Start: 2018-01-01 | End: 2018-01-01 | Stop reason: HOSPADM

## 2018-01-01 RX ORDER — AZITHROMYCIN 250 MG/1
250 TABLET, FILM COATED ORAL DAILY
Status: DISCONTINUED | OUTPATIENT
Start: 2018-01-01 | End: 2018-01-01

## 2018-01-01 RX ORDER — METHYLPREDNISOLONE SOD SUCC 125 MG
125 VIAL (EA) INJECTION
Status: COMPLETED | OUTPATIENT
Start: 2018-01-01 | End: 2018-01-01

## 2018-01-01 RX ORDER — IPRATROPIUM BROMIDE 0.5 MG/2.5ML
0.5 SOLUTION RESPIRATORY (INHALATION) ONCE
Status: COMPLETED | OUTPATIENT
Start: 2018-01-01 | End: 2018-01-01

## 2018-01-01 RX ORDER — AZITHROMYCIN 250 MG/1
500 TABLET, FILM COATED ORAL ONCE
Status: COMPLETED | OUTPATIENT
Start: 2018-01-01 | End: 2018-01-01

## 2018-01-01 RX ORDER — IBUPROFEN 200 MG
1 TABLET ORAL DAILY
Refills: 0 | Status: ON HOLD | COMMUNITY
Start: 2018-01-01 | End: 2018-01-01 | Stop reason: SDUPTHER

## 2018-01-01 RX ORDER — ALBUTEROL SULFATE 0.83 MG/ML
2.5 SOLUTION RESPIRATORY (INHALATION) EVERY 4 HOURS PRN
Qty: 225 ML | Refills: 11 | Status: SHIPPED | OUTPATIENT
Start: 2018-01-01 | End: 2019-01-01 | Stop reason: SDUPTHER

## 2018-01-01 RX ORDER — MOXIFLOXACIN HYDROCHLORIDE 400 MG/1
400 TABLET ORAL
Status: COMPLETED | OUTPATIENT
Start: 2018-01-01 | End: 2018-01-01

## 2018-01-01 RX ORDER — ALBUTEROL SULFATE 2.5 MG/.5ML
15 SOLUTION RESPIRATORY (INHALATION) ONCE
Status: DISCONTINUED | OUTPATIENT
Start: 2018-01-01 | End: 2018-01-01

## 2018-01-01 RX ORDER — IPRATROPIUM BROMIDE 0.5 MG/2.5ML
500 SOLUTION RESPIRATORY (INHALATION)
Status: COMPLETED | OUTPATIENT
Start: 2018-01-01 | End: 2018-01-01

## 2018-01-01 RX ORDER — PREDNISONE 20 MG/1
40 TABLET ORAL DAILY
Qty: 8 TABLET | Refills: 0 | Status: SHIPPED | OUTPATIENT
Start: 2018-01-01 | End: 2018-01-01

## 2018-01-01 RX ORDER — SODIUM CHLORIDE 0.9 % (FLUSH) 0.9 %
3 SYRINGE (ML) INJECTION
Status: DISCONTINUED | OUTPATIENT
Start: 2018-01-01 | End: 2018-01-01 | Stop reason: HOSPADM

## 2018-01-01 RX ORDER — ACETAMINOPHEN 325 MG/1
650 TABLET ORAL EVERY 4 HOURS PRN
Status: DISCONTINUED | OUTPATIENT
Start: 2018-01-01 | End: 2018-01-01 | Stop reason: HOSPADM

## 2018-01-01 RX ORDER — ALBUTEROL SULFATE 90 UG/1
1-2 AEROSOL, METERED RESPIRATORY (INHALATION) EVERY 4 HOURS PRN
Qty: 18 G | Refills: 11 | Status: SHIPPED | OUTPATIENT
Start: 2018-01-01 | End: 2019-01-01 | Stop reason: SDUPTHER

## 2018-01-01 RX ORDER — HYDROCODONE BITARTRATE AND ACETAMINOPHEN 5; 325 MG/1; MG/1
1 TABLET ORAL EVERY 4 HOURS PRN
Status: DISCONTINUED | OUTPATIENT
Start: 2018-01-01 | End: 2018-01-01 | Stop reason: HOSPADM

## 2018-01-01 RX ORDER — BUTALBITAL, ACETAMINOPHEN AND CAFFEINE 50; 325; 40 MG/1; MG/1; MG/1
1 TABLET ORAL
Status: COMPLETED | OUTPATIENT
Start: 2018-01-01 | End: 2018-01-01

## 2018-01-01 RX ORDER — PREDNISONE 20 MG/1
40 TABLET ORAL DAILY
Status: DISCONTINUED | OUTPATIENT
Start: 2018-01-01 | End: 2018-01-01 | Stop reason: HOSPADM

## 2018-01-01 RX ORDER — ALBUTEROL SULFATE 2.5 MG/.5ML
15 SOLUTION RESPIRATORY (INHALATION) ONCE
Status: COMPLETED | OUTPATIENT
Start: 2018-01-01 | End: 2018-01-01

## 2018-01-01 RX ORDER — GUAIFENESIN 600 MG/1
1200 TABLET, EXTENDED RELEASE ORAL 2 TIMES DAILY
Status: DISCONTINUED | OUTPATIENT
Start: 2018-01-01 | End: 2018-01-01 | Stop reason: HOSPADM

## 2018-01-01 RX ORDER — RAMELTEON 8 MG/1
8 TABLET ORAL NIGHTLY PRN
Status: DISCONTINUED | OUTPATIENT
Start: 2018-01-01 | End: 2018-01-01 | Stop reason: HOSPADM

## 2018-01-01 RX ORDER — FLUTICASONE FUROATE AND VILANTEROL 100; 25 UG/1; UG/1
1 POWDER RESPIRATORY (INHALATION) DAILY
Status: DISCONTINUED | OUTPATIENT
Start: 2018-01-01 | End: 2018-01-01 | Stop reason: HOSPADM

## 2018-01-01 RX ORDER — FLUTICASONE FUROATE AND VILANTEROL 200; 25 UG/1; UG/1
1 POWDER RESPIRATORY (INHALATION) DAILY
Qty: 60 EACH | Refills: 3 | Status: ON HOLD | OUTPATIENT
Start: 2018-01-01 | End: 2019-01-01 | Stop reason: HOSPADM

## 2018-01-01 RX ORDER — ALBUTEROL SULFATE 2.5 MG/.5ML
10 SOLUTION RESPIRATORY (INHALATION) ONCE
Status: DISCONTINUED | OUTPATIENT
Start: 2018-01-01 | End: 2018-01-01

## 2018-01-01 RX ORDER — ALBUTEROL SULFATE 0.83 MG/ML
10 SOLUTION RESPIRATORY (INHALATION)
Status: COMPLETED | OUTPATIENT
Start: 2018-01-01 | End: 2018-01-01

## 2018-01-01 RX ORDER — HYDRALAZINE HYDROCHLORIDE 20 MG/ML
5 INJECTION INTRAMUSCULAR; INTRAVENOUS
Status: COMPLETED | OUTPATIENT
Start: 2018-01-01 | End: 2018-01-01

## 2018-01-01 RX ORDER — ALBUTEROL SULFATE 90 UG/1
1-2 AEROSOL, METERED RESPIRATORY (INHALATION) EVERY 6 HOURS PRN
Qty: 18 G | Refills: 11 | Status: ON HOLD | OUTPATIENT
Start: 2018-01-01 | End: 2018-01-01 | Stop reason: SDUPTHER

## 2018-01-01 RX ORDER — IPRATROPIUM BROMIDE AND ALBUTEROL SULFATE 2.5; .5 MG/3ML; MG/3ML
3 SOLUTION RESPIRATORY (INHALATION)
Status: COMPLETED | OUTPATIENT
Start: 2018-01-01 | End: 2018-01-01

## 2018-01-01 RX ORDER — METHYLPREDNISOLONE SOD SUCC 125 MG
125 VIAL (EA) INJECTION EVERY 8 HOURS
Status: DISCONTINUED | OUTPATIENT
Start: 2018-01-01 | End: 2018-01-01

## 2018-01-01 RX ORDER — PREDNISONE 50 MG/1
50 TABLET ORAL DAILY
Qty: 10 TABLET | Refills: 0 | Status: SHIPPED | OUTPATIENT
Start: 2018-01-01 | End: 2018-01-01

## 2018-01-01 RX ORDER — IPRATROPIUM BROMIDE 0.5 MG/2.5ML
0.5 SOLUTION RESPIRATORY (INHALATION) ONCE
Status: DISCONTINUED | OUTPATIENT
Start: 2018-01-01 | End: 2018-01-01

## 2018-01-01 RX ORDER — ALBUTEROL SULFATE 2.5 MG/.5ML
5 SOLUTION RESPIRATORY (INHALATION)
Status: COMPLETED | OUTPATIENT
Start: 2018-01-01 | End: 2018-01-01

## 2018-01-01 RX ORDER — SODIUM CHLORIDE FOR INHALATION 3 %
4 VIAL, NEBULIZER (ML) INHALATION
Status: DISCONTINUED | OUTPATIENT
Start: 2018-01-01 | End: 2018-01-01 | Stop reason: HOSPADM

## 2018-01-01 RX ORDER — IPRATROPIUM BROMIDE AND ALBUTEROL SULFATE 2.5; .5 MG/3ML; MG/3ML
3 SOLUTION RESPIRATORY (INHALATION) ONCE
Status: COMPLETED | OUTPATIENT
Start: 2018-01-01 | End: 2018-01-01

## 2018-01-01 RX ORDER — DEXAMETHASONE SODIUM PHOSPHATE 4 MG/ML
8 INJECTION, SOLUTION INTRA-ARTICULAR; INTRALESIONAL; INTRAMUSCULAR; INTRAVENOUS; SOFT TISSUE
Status: COMPLETED | OUTPATIENT
Start: 2018-01-01 | End: 2018-01-01

## 2018-01-01 RX ORDER — PREDNISONE 20 MG/1
40 TABLET ORAL DAILY
Status: DISCONTINUED | OUTPATIENT
Start: 2018-01-01 | End: 2018-01-01

## 2018-01-01 RX ORDER — MORPHINE SULFATE 4 MG/ML
0.5 INJECTION, SOLUTION INTRAMUSCULAR; INTRAVENOUS ONCE
Status: COMPLETED | OUTPATIENT
Start: 2018-01-01 | End: 2018-01-01

## 2018-01-01 RX ORDER — LABETALOL HYDROCHLORIDE 5 MG/ML
20 INJECTION, SOLUTION INTRAVENOUS ONCE
Status: COMPLETED | OUTPATIENT
Start: 2018-01-01 | End: 2018-01-01

## 2018-01-01 RX ORDER — LEVOFLOXACIN 500 MG/1
500 TABLET, FILM COATED ORAL DAILY
Qty: 5 TABLET | Refills: 0 | Status: SHIPPED | OUTPATIENT
Start: 2018-01-01 | End: 2018-01-01

## 2018-01-01 RX ORDER — ALBUTEROL SULFATE 2.5 MG/.5ML
2.5 SOLUTION RESPIRATORY (INHALATION) EVERY 4 HOURS PRN
Status: DISCONTINUED | OUTPATIENT
Start: 2018-01-01 | End: 2018-01-01 | Stop reason: HOSPADM

## 2018-01-01 RX ORDER — CETIRIZINE HYDROCHLORIDE 10 MG/1
10 TABLET ORAL DAILY
Status: DISCONTINUED | OUTPATIENT
Start: 2018-01-01 | End: 2018-01-01 | Stop reason: HOSPADM

## 2018-01-01 RX ORDER — LORAZEPAM 2 MG/ML
INJECTION INTRAMUSCULAR
Status: COMPLETED
Start: 2018-01-01 | End: 2018-01-01

## 2018-01-01 RX ORDER — HYDROCHLOROTHIAZIDE 25 MG/1
25 TABLET ORAL DAILY
Qty: 30 TABLET | Refills: 11 | Status: SHIPPED | OUTPATIENT
Start: 2018-01-01 | End: 2019-01-01 | Stop reason: SDUPTHER

## 2018-01-01 RX ORDER — KETOROLAC TROMETHAMINE 30 MG/ML
15 INJECTION, SOLUTION INTRAMUSCULAR; INTRAVENOUS
Status: COMPLETED | OUTPATIENT
Start: 2018-01-01 | End: 2018-01-01

## 2018-01-01 RX ORDER — AMLODIPINE BESYLATE 10 MG/1
10 TABLET ORAL DAILY
Qty: 30 TABLET | Refills: 11 | Status: SHIPPED | OUTPATIENT
Start: 2018-01-01 | End: 2019-01-01 | Stop reason: SDUPTHER

## 2018-01-01 RX ORDER — IPRATROPIUM BROMIDE AND ALBUTEROL SULFATE 2.5; .5 MG/3ML; MG/3ML
3 SOLUTION RESPIRATORY (INHALATION) EVERY 4 HOURS
Status: DISCONTINUED | OUTPATIENT
Start: 2018-01-01 | End: 2018-01-01 | Stop reason: HOSPADM

## 2018-01-01 RX ORDER — ALBUTEROL SULFATE 2.5 MG/.5ML
15 SOLUTION RESPIRATORY (INHALATION)
Status: COMPLETED | OUTPATIENT
Start: 2018-01-01 | End: 2018-01-01

## 2018-01-01 RX ORDER — GUAIFENESIN 600 MG/1
600 TABLET, EXTENDED RELEASE ORAL 2 TIMES DAILY
Status: DISCONTINUED | OUTPATIENT
Start: 2018-01-01 | End: 2018-01-01

## 2018-01-01 RX ORDER — IBUPROFEN 200 MG
1 TABLET ORAL DAILY
Qty: 28 PATCH | Refills: 8 | Status: ON HOLD | OUTPATIENT
Start: 2018-01-01 | End: 2019-01-01 | Stop reason: HOSPADM

## 2018-01-01 RX ORDER — LORAZEPAM 1 MG/1
1 TABLET ORAL ONCE
Status: COMPLETED | OUTPATIENT
Start: 2018-01-01 | End: 2018-01-01

## 2018-01-01 RX ORDER — HYDRALAZINE HYDROCHLORIDE 20 MG/ML
10 INJECTION INTRAMUSCULAR; INTRAVENOUS EVERY 6 HOURS PRN
Status: DISCONTINUED | OUTPATIENT
Start: 2018-01-01 | End: 2018-01-01 | Stop reason: HOSPADM

## 2018-01-01 RX ORDER — ALBUTEROL SULFATE 2.5 MG/.5ML
10 SOLUTION RESPIRATORY (INHALATION) ONCE
Status: COMPLETED | OUTPATIENT
Start: 2018-01-01 | End: 2018-01-01

## 2018-01-01 RX ORDER — AMOXICILLIN 250 MG
1 CAPSULE ORAL 2 TIMES DAILY
Status: DISCONTINUED | OUTPATIENT
Start: 2018-01-01 | End: 2018-01-01 | Stop reason: HOSPADM

## 2018-01-01 RX ADMIN — IPRATROPIUM BROMIDE AND ALBUTEROL SULFATE 3 ML: .5; 3 SOLUTION RESPIRATORY (INHALATION) at 07:10

## 2018-01-01 RX ADMIN — SENNOSIDES AND DOCUSATE SODIUM 1 TABLET: 8.6; 5 TABLET ORAL at 08:10

## 2018-01-01 RX ADMIN — METHYLPREDNISOLONE SODIUM SUCCINATE 40 MG: 40 INJECTION, POWDER, FOR SOLUTION INTRAMUSCULAR; INTRAVENOUS at 01:10

## 2018-01-01 RX ADMIN — IPRATROPIUM BROMIDE AND ALBUTEROL SULFATE 3 ML: .5; 3 SOLUTION RESPIRATORY (INHALATION) at 03:10

## 2018-01-01 RX ADMIN — METHYLPREDNISOLONE SODIUM SUCCINATE 125 MG: 125 INJECTION, POWDER, FOR SOLUTION INTRAMUSCULAR; INTRAVENOUS at 01:10

## 2018-01-01 RX ADMIN — RAMELTEON 8 MG: 8 TABLET, FILM COATED ORAL at 09:10

## 2018-01-01 RX ADMIN — GUAIFENESIN 1200 MG: 600 TABLET, EXTENDED RELEASE ORAL at 09:10

## 2018-01-01 RX ADMIN — ENOXAPARIN SODIUM 40 MG: 100 INJECTION SUBCUTANEOUS at 05:10

## 2018-01-01 RX ADMIN — ENOXAPARIN SODIUM 40 MG: 100 INJECTION SUBCUTANEOUS at 04:10

## 2018-01-01 RX ADMIN — IPRATROPIUM BROMIDE 0.5 MG: 0.5 SOLUTION RESPIRATORY (INHALATION) at 08:10

## 2018-01-01 RX ADMIN — ALBUTEROL SULFATE 10 MG: 2.5 SOLUTION RESPIRATORY (INHALATION) at 08:10

## 2018-01-01 RX ADMIN — METHYLPREDNISOLONE SODIUM SUCCINATE 125 MG: 125 INJECTION, POWDER, FOR SOLUTION INTRAMUSCULAR; INTRAVENOUS at 08:06

## 2018-01-01 RX ADMIN — IPRATROPIUM BROMIDE AND ALBUTEROL SULFATE 3 ML: .5; 3 SOLUTION RESPIRATORY (INHALATION) at 11:06

## 2018-01-01 RX ADMIN — METHYLPREDNISOLONE SODIUM SUCCINATE 125 MG: 125 INJECTION, POWDER, FOR SOLUTION INTRAMUSCULAR; INTRAVENOUS at 05:10

## 2018-01-01 RX ADMIN — IPRATROPIUM BROMIDE AND ALBUTEROL SULFATE 3 ML: .5; 3 SOLUTION RESPIRATORY (INHALATION) at 11:10

## 2018-01-01 RX ADMIN — FLUTICASONE FUROATE AND VILANTEROL TRIFENATATE 1 PUFF: 200; 25 POWDER RESPIRATORY (INHALATION) at 07:10

## 2018-01-01 RX ADMIN — HYDROCODONE BITARTRATE AND ACETAMINOPHEN 1 TABLET: 5; 325 TABLET ORAL at 09:10

## 2018-01-01 RX ADMIN — RAMELTEON 8 MG: 8 TABLET, FILM COATED ORAL at 08:10

## 2018-01-01 RX ADMIN — ACETAMINOPHEN 650 MG: 325 TABLET, FILM COATED ORAL at 07:10

## 2018-01-01 RX ADMIN — HYDRALAZINE HYDROCHLORIDE 5 MG: 20 INJECTION INTRAMUSCULAR; INTRAVENOUS at 02:10

## 2018-01-01 RX ADMIN — ENOXAPARIN SODIUM 40 MG: 100 INJECTION SUBCUTANEOUS at 10:10

## 2018-01-01 RX ADMIN — LORAZEPAM 0.5 MG: 2 INJECTION INTRAMUSCULAR; INTRAVENOUS at 08:10

## 2018-01-01 RX ADMIN — ALBUTEROL SULFATE 2.5 MG: 2.5 SOLUTION RESPIRATORY (INHALATION) at 02:10

## 2018-01-01 RX ADMIN — HYDROCHLOROTHIAZIDE 25 MG: 25 TABLET ORAL at 08:10

## 2018-01-01 RX ADMIN — LABETALOL HYDROCHLORIDE 20 MG: 5 INJECTION INTRAVENOUS at 06:10

## 2018-01-01 RX ADMIN — IPRATROPIUM BROMIDE AND ALBUTEROL SULFATE 3 ML: .5; 3 SOLUTION RESPIRATORY (INHALATION) at 12:10

## 2018-01-01 RX ADMIN — ACETAMINOPHEN 650 MG: 325 TABLET, FILM COATED ORAL at 11:10

## 2018-01-01 RX ADMIN — METHYLPREDNISOLONE SODIUM SUCCINATE 125 MG: 125 INJECTION, POWDER, FOR SOLUTION INTRAMUSCULAR; INTRAVENOUS at 10:10

## 2018-01-01 RX ADMIN — METHYLPREDNISOLONE SODIUM SUCCINATE 40 MG: 40 INJECTION, POWDER, FOR SOLUTION INTRAMUSCULAR; INTRAVENOUS at 09:10

## 2018-01-01 RX ADMIN — CETIRIZINE HYDROCHLORIDE 10 MG: 10 TABLET, FILM COATED ORAL at 08:10

## 2018-01-01 RX ADMIN — METHYLPREDNISOLONE SODIUM SUCCINATE 125 MG: 125 INJECTION, POWDER, FOR SOLUTION INTRAMUSCULAR; INTRAVENOUS at 09:10

## 2018-01-01 RX ADMIN — FLUTICASONE FUROATE AND VILANTEROL TRIFENATATE 1 PUFF: 100; 25 POWDER RESPIRATORY (INHALATION) at 07:06

## 2018-01-01 RX ADMIN — GUAIFENESIN 1200 MG: 600 TABLET, EXTENDED RELEASE ORAL at 08:10

## 2018-01-01 RX ADMIN — CETIRIZINE HYDROCHLORIDE 10 MG: 10 TABLET, FILM COATED ORAL at 09:10

## 2018-01-01 RX ADMIN — MOXIFLOXACIN HYDROCHLORIDE 400 MG: 400 TABLET, FILM COATED ORAL at 10:06

## 2018-01-01 RX ADMIN — AZITHROMYCIN MONOHYDRATE 500 MG: 250 TABLET ORAL at 09:10

## 2018-01-01 RX ADMIN — NICOTINE 1 PATCH: 21 PATCH, EXTENDED RELEASE TRANSDERMAL at 09:10

## 2018-01-01 RX ADMIN — AZITHROMYCIN MONOHYDRATE 250 MG: 250 TABLET ORAL at 08:10

## 2018-01-01 RX ADMIN — NICOTINE 1 PATCH: 21 PATCH, EXTENDED RELEASE TRANSDERMAL at 08:10

## 2018-01-01 RX ADMIN — SENNOSIDES AND DOCUSATE SODIUM 1 TABLET: 8.6; 5 TABLET ORAL at 09:10

## 2018-01-01 RX ADMIN — IPRATROPIUM BROMIDE 500 MCG: 0.5 SOLUTION RESPIRATORY (INHALATION) at 07:06

## 2018-01-01 RX ADMIN — NICOTINE 1 PATCH: 21 PATCH, EXTENDED RELEASE TRANSDERMAL at 10:10

## 2018-01-01 RX ADMIN — BUTALBITAL, ACETAMINOPHEN, AND CAFFEINE 1 TABLET: 50; 325; 40 TABLET ORAL at 01:10

## 2018-01-01 RX ADMIN — IPRATROPIUM BROMIDE AND ALBUTEROL SULFATE 3 ML: .5; 3 SOLUTION RESPIRATORY (INHALATION) at 07:06

## 2018-01-01 RX ADMIN — IPRATROPIUM BROMIDE AND ALBUTEROL SULFATE 3 ML: .5; 3 SOLUTION RESPIRATORY (INHALATION) at 06:08

## 2018-01-01 RX ADMIN — PREDNISONE 40 MG: 20 TABLET ORAL at 03:06

## 2018-01-01 RX ADMIN — CETIRIZINE HYDROCHLORIDE 10 MG: 10 TABLET, FILM COATED ORAL at 10:10

## 2018-01-01 RX ADMIN — DEXAMETHASONE SODIUM PHOSPHATE 8 MG: 4 INJECTION, SOLUTION INTRAMUSCULAR; INTRAVENOUS at 08:12

## 2018-01-01 RX ADMIN — HYDROCHLOROTHIAZIDE 25 MG: 25 TABLET ORAL at 10:10

## 2018-01-01 RX ADMIN — SENNOSIDES AND DOCUSATE SODIUM 1 TABLET: 8.6; 5 TABLET ORAL at 10:10

## 2018-01-01 RX ADMIN — METHYLPREDNISOLONE SODIUM SUCCINATE 40 MG: 40 INJECTION, POWDER, FOR SOLUTION INTRAMUSCULAR; INTRAVENOUS at 05:10

## 2018-01-01 RX ADMIN — METHYLPREDNISOLONE SODIUM SUCCINATE 40 MG: 40 INJECTION, POWDER, FOR SOLUTION INTRAMUSCULAR; INTRAVENOUS at 02:10

## 2018-01-01 RX ADMIN — ALBUTEROL SULFATE 2.5 MG: 2.5 SOLUTION RESPIRATORY (INHALATION) at 07:10

## 2018-01-01 RX ADMIN — METHYLPREDNISOLONE SODIUM SUCCINATE 125 MG: 125 INJECTION, POWDER, FOR SOLUTION INTRAMUSCULAR; INTRAVENOUS at 02:10

## 2018-01-01 RX ADMIN — AMLODIPINE BESYLATE 10 MG: 5 TABLET ORAL at 08:10

## 2018-01-01 RX ADMIN — AMLODIPINE BESYLATE 10 MG: 5 TABLET ORAL at 10:10

## 2018-01-01 RX ADMIN — IPRATROPIUM BROMIDE AND ALBUTEROL SULFATE 3 ML: .5; 3 SOLUTION RESPIRATORY (INHALATION) at 08:10

## 2018-01-01 RX ADMIN — HYDROCHLOROTHIAZIDE 25 MG: 25 TABLET ORAL at 09:10

## 2018-01-01 RX ADMIN — METHYLPREDNISOLONE SODIUM SUCCINATE 125 MG: 125 INJECTION, POWDER, FOR SOLUTION INTRAMUSCULAR; INTRAVENOUS at 07:08

## 2018-01-01 RX ADMIN — GUAIFENESIN 600 MG: 600 TABLET, EXTENDED RELEASE ORAL at 08:10

## 2018-01-01 RX ADMIN — HYDROCODONE BITARTRATE AND ACETAMINOPHEN 1 TABLET: 5; 325 TABLET ORAL at 04:06

## 2018-01-01 RX ADMIN — MORPHINE SULFATE 0.5 MG: 4 INJECTION INTRAVENOUS at 05:10

## 2018-01-01 RX ADMIN — ALBUTEROL SULFATE 2.5 MG: 2.5 SOLUTION RESPIRATORY (INHALATION) at 04:10

## 2018-01-01 RX ADMIN — ALBUTEROL SULFATE 15 MG: 2.5 SOLUTION RESPIRATORY (INHALATION) at 06:08

## 2018-01-01 RX ADMIN — PENICILLIN G BENZATHINE 1.2 MILLION UNITS: 1200000 INJECTION, SUSPENSION INTRAMUSCULAR at 08:12

## 2018-01-01 RX ADMIN — HYDRALAZINE HYDROCHLORIDE 10 MG: 20 INJECTION INTRAMUSCULAR; INTRAVENOUS at 03:10

## 2018-01-01 RX ADMIN — IPRATROPIUM BROMIDE AND ALBUTEROL SULFATE 3 ML: .5; 3 SOLUTION RESPIRATORY (INHALATION) at 05:10

## 2018-01-01 RX ADMIN — IPRATROPIUM BROMIDE AND ALBUTEROL SULFATE 3 ML: .5; 3 SOLUTION RESPIRATORY (INHALATION) at 04:10

## 2018-01-01 RX ADMIN — HYDRALAZINE HYDROCHLORIDE 5 MG: 20 INJECTION INTRAMUSCULAR; INTRAVENOUS at 04:10

## 2018-01-01 RX ADMIN — METHYLPREDNISOLONE SODIUM SUCCINATE 125 MG: 125 INJECTION, POWDER, FOR SOLUTION INTRAMUSCULAR; INTRAVENOUS at 08:10

## 2018-01-01 RX ADMIN — HYDROCODONE BITARTRATE AND ACETAMINOPHEN 1 TABLET: 5; 325 TABLET ORAL at 08:06

## 2018-01-01 RX ADMIN — LABETALOL HYDROCHLORIDE 20 MG: 5 INJECTION INTRAVENOUS at 08:10

## 2018-01-01 RX ADMIN — GUAIFENESIN 600 MG: 600 TABLET, EXTENDED RELEASE ORAL at 11:10

## 2018-01-01 RX ADMIN — AZITHROMYCIN MONOHYDRATE 250 MG: 250 TABLET ORAL at 10:10

## 2018-01-01 RX ADMIN — KETOROLAC TROMETHAMINE 15 MG: 30 INJECTION, SOLUTION INTRAMUSCULAR at 07:08

## 2018-01-01 RX ADMIN — INFLUENZA A VIRUS A/MICHIGAN/45/2015 X-275 (H1N1) ANTIGEN (FORMALDEHYDE INACTIVATED), INFLUENZA A VIRUS A/SINGAPORE/INFIMH-16-0019/2016 IVR-186 (H3N2) ANTIGEN (FORMALDEHYDE INACTIVATED), INFLUENZA B VIRUS B/PHUKET/3073/2013 ANTIGEN (FORMALDEHYDE INACTIVATED), AND INFLUENZA B VIRUS B/MARYLAND/15/2016 BX-69A ANTIGEN (FORMALDEHYDE INACTIVATED) 0.5 ML: 15; 15; 15; 15 INJECTION, SUSPENSION INTRAMUSCULAR at 11:10

## 2018-01-01 RX ADMIN — LORAZEPAM: 2 INJECTION INTRAMUSCULAR; INTRAVENOUS at 08:10

## 2018-01-01 RX ADMIN — IPRATROPIUM BROMIDE AND ALBUTEROL SULFATE 3 ML: .5; 3 SOLUTION RESPIRATORY (INHALATION) at 03:06

## 2018-01-01 RX ADMIN — PREDNISONE 40 MG: 20 TABLET ORAL at 08:10

## 2018-01-01 RX ADMIN — GUAIFENESIN 1200 MG: 600 TABLET, EXTENDED RELEASE ORAL at 10:10

## 2018-01-01 RX ADMIN — ENOXAPARIN SODIUM 40 MG: 100 INJECTION SUBCUTANEOUS at 06:10

## 2018-01-01 RX ADMIN — PREDNISONE 40 MG: 20 TABLET ORAL at 10:10

## 2018-01-01 RX ADMIN — LORAZEPAM 0.5 MG: 2 INJECTION INTRAMUSCULAR; INTRAVENOUS at 07:08

## 2018-01-01 RX ADMIN — HYDRALAZINE HYDROCHLORIDE 5 MG: 20 INJECTION INTRAMUSCULAR; INTRAVENOUS at 05:10

## 2018-01-01 RX ADMIN — NICOTINE 1 PATCH: 21 PATCH, EXTENDED RELEASE TRANSDERMAL at 08:06

## 2018-01-01 RX ADMIN — PREDNISONE 40 MG: 20 TABLET ORAL at 08:06

## 2018-01-01 RX ADMIN — AMLODIPINE BESYLATE 10 MG: 5 TABLET ORAL at 09:10

## 2018-01-01 RX ADMIN — LORAZEPAM 1 MG: 1 TABLET ORAL at 02:10

## 2018-01-01 RX ADMIN — ALBUTEROL SULFATE 5 MG: 2.5 SOLUTION RESPIRATORY (INHALATION) at 08:10

## 2018-01-01 RX ADMIN — FLUTICASONE FUROATE AND VILANTEROL TRIFENATATE 1 PUFF: 200; 25 POWDER RESPIRATORY (INHALATION) at 11:10

## 2018-01-01 RX ADMIN — ENOXAPARIN SODIUM 40 MG: 100 INJECTION SUBCUTANEOUS at 08:06

## 2018-01-01 RX ADMIN — IPRATROPIUM BROMIDE AND ALBUTEROL SULFATE 3 ML: .5; 3 SOLUTION RESPIRATORY (INHALATION) at 12:06

## 2018-01-01 RX ADMIN — IPRATROPIUM BROMIDE AND ALBUTEROL SULFATE 3 ML: .5; 3 SOLUTION RESPIRATORY (INHALATION) at 04:06

## 2018-01-01 RX ADMIN — HYDROCODONE BITARTRATE AND ACETAMINOPHEN 1 TABLET: 5; 325 TABLET ORAL at 08:10

## 2018-01-01 RX ADMIN — MOXIFLOXACIN HYDROCHLORIDE 400 MG: 400 TABLET, FILM COATED ORAL at 09:08

## 2018-01-01 RX ADMIN — IPRATROPIUM BROMIDE AND ALBUTEROL SULFATE 3 ML: .5; 3 SOLUTION RESPIRATORY (INHALATION) at 08:06

## 2018-01-01 RX ADMIN — ALBUTEROL SULFATE 10 MG: 2.5 SOLUTION RESPIRATORY (INHALATION) at 07:06

## 2018-01-01 RX ADMIN — HYDRALAZINE HYDROCHLORIDE 5 MG: 20 INJECTION INTRAMUSCULAR; INTRAVENOUS at 08:06

## 2018-01-01 RX ADMIN — MOXIFLOXACIN HYDROCHLORIDE 400 MG: 400 TABLET, FILM COATED ORAL at 08:06

## 2018-01-01 RX ADMIN — ALBUTEROL SULFATE 15 MG: 2.5 SOLUTION RESPIRATORY (INHALATION) at 01:10

## 2018-06-25 PROBLEM — J96.02 ACUTE RESPIRATORY FAILURE WITH HYPOXIA AND HYPERCAPNIA: Status: ACTIVE | Noted: 2018-01-01

## 2018-06-25 PROBLEM — J44.1 COPD EXACERBATION: Status: ACTIVE | Noted: 2018-01-01

## 2018-06-25 PROBLEM — J96.01 ACUTE RESPIRATORY FAILURE WITH HYPOXIA AND HYPERCAPNIA: Status: ACTIVE | Noted: 2018-01-01

## 2018-06-25 NOTE — ED NOTES
62 year old male presents to ed cc of sob and midsternal chest pain with coughing that began last night. Patient reports he used inhalers at home with no relief. Patient presents awake alert ox4 able to identify name and  on armband. Patient placed on bp cuff pulse ox and cardiac monitor call light at bedside nurse will continue to monitor

## 2018-06-25 NOTE — PROGRESS NOTES
Results for NICOLAS SPRAGUE (MRN [id number]) as of 6/25/2018 12:14   Ref. Range 6/25/2018 12:06   POC PH Latest Ref Range: 7.35 - 7.45  7.362   POC PCO2 Latest Ref Range: 35 - 45 mmHg 47.3 (H)   POC PO2 Latest Ref Range: 80 - 100 mmHg 164 (H)   POC BE Latest Ref Range: -2 to 2 mmol/L 1   POC HCO3 Latest Ref Range: 24 - 28 mmol/L 26.8   POC SATURATED O2 Latest Ref Range: 95 - 100 % 99   POC TCO2 Latest Ref Range: 23 - 27 mmol/L 28 (H)   FiO2 Unknown 40   Sample Unknown ARTERIAL   DelSys Unknown CPAP/BiPAP   Allens Test Unknown Pass   Site Unknown RR   Mode Unknown BiPAP   Results given to MARY Childs

## 2018-06-25 NOTE — H&P
"[facility]  History & Physical    Patient Name: Abhijit Marie  MRN: [id number]  Admission Date: 6/25/2018  Attending Physician: Rogelio Grant MD  Primary Care Provider: Primary Doctor No         Patient information was obtained from patient, spouse/SO, past medical records and ER records.     Subjective:     Principal Problem:Chronic obstructive pulmonary disease with acute exacerbation    Chief Complaint:   Chief Complaint   Patient presents with    Shortness of Breath     SOB since last night at rest. no relief with inhaler        HPI: Mr. Marie is a 63 yo BM with HTN and COPD that presented [facility] ED complaining of progressive YING and cough. He says that he has not been feeling well for the last week. He notes increasing frequency of cough that has been on productive. He also reports worsening SOB to where he has become SOB at rest recently. His breathing got even worse this AM and he could not sleep overnight, so he came to the hospital. He reports some chest pain when he was coughing this AM. He also had episode of post-tussive emesis last week. He denies any sick contacts or fever/chills. He reports waking up at night many nights a week "gasping for air". He says that he is able to go back to sleep usually after using his inhaler.     He initially required BiPAP in the ED because of his breathing difficulties until the therapies had more time to take effect. He was given IV solumedrol, a continuous breathing treatment for 1 hour, and moxifloxacin in the ED.     Past Medical History:   Diagnosis Date    COPD (chronic obstructive pulmonary disease)     Hypertension     Reactive airway disease 12/2/2014       History reviewed. No pertinent surgical history.    Review of patient's allergies indicates:  No Known Allergies    No current facility-administered medications on file prior to encounter.      Current Outpatient Prescriptions on File Prior to Encounter "   Medication Sig    albuterol 90 mcg/actuation inhaler Inhale 1-2 puffs into the lungs every 6 (six) hours as needed for Wheezing.    hydrochlorothiazide (HYDRODIURIL) 25 MG tablet Take 1 tablet (25 mg total) by mouth once daily.    [DISCONTINUED] albuterol-ipratropium 2.5mg-0.5mg/3mL (DUO-NEB) 0.5 mg-3 mg(2.5 mg base)/3 mL nebulizer solution Take 3 mLs by nebulization every 6 (six) hours as needed for Wheezing. Rescue    [DISCONTINUED] budesonide (PULMICORT) 0.5 mg/2 mL nebulizer solution Take 2 mLs (0.5 mg total) by nebulization every 12 (twelve) hours.    [DISCONTINUED] nicotine (NICODERM CQ) 14 mg/24 hr Place 1 patch onto the skin once daily.     Family History     Problem Relation (Age of Onset)    Heart disease Mother, Father        Social History Main Topics    Smoking status: Current Every Day Smoker     Packs/day: 1.00     Years: 40.00     Types: Cigarettes    Smokeless tobacco: Never Used    Alcohol use No    Drug use: No    Sexual activity: Not on file     Review of Systems   Constitutional: Negative for appetite change, chills, fatigue and fever.   HENT: Negative for congestion, hearing loss, nosebleeds, sore throat and tinnitus.    Eyes: Negative for discharge, itching and visual disturbance.   Respiratory: Positive for cough, chest tightness and shortness of breath.    Cardiovascular: Positive for chest pain and palpitations.   Gastrointestinal: Negative for abdominal pain, blood in stool, diarrhea, nausea and vomiting.   Endocrine: Negative for cold intolerance and polydipsia.   Genitourinary: Negative for difficulty urinating, dysuria, hematuria and urgency.   Musculoskeletal: Negative for arthralgias, myalgias and neck stiffness.   Skin: Negative for rash and wound.   Allergic/Immunologic: Negative for environmental allergies and immunocompromised state.   Neurological: Negative for dizziness, tremors, light-headedness and headaches.   Hematological: Does not bruise/bleed easily.    Psychiatric/Behavioral: Negative for agitation and confusion. The patient is not nervous/anxious.      Objective:     Vital Signs (Most Recent):  Temp: 97.7 °F (36.5 °C) (06/25/18 0718)  Pulse: 88 (06/25/18 1102)  Resp: (!) 22 (06/25/18 0837)  BP: (!) 167/108 (06/25/18 1102)  SpO2: 98 % (06/25/18 1102) Vital Signs (24h Range):  Temp:  [97.7 °F (36.5 °C)] 97.7 °F (36.5 °C)  Pulse:  [] 88  Resp:  [19-22] 22  SpO2:  [94 %-99 %] 98 %  BP: (156-197)/(107-130) 167/108     Weight: 86.2 kg (190 lb)  Body mass index is 25.77 kg/m².    Physical Exam   Constitutional: He is oriented to person, place, and time. He appears well-developed and well-nourished. He is cooperative. No distress. Nasal cannula in place.   HENT:   Head: Normocephalic and atraumatic.   Right Ear: External ear normal.   Left Ear: External ear normal.   Nose: No mucosal edema or sinus tenderness.   Mouth/Throat: Uvula is midline, oropharynx is clear and moist and mucous membranes are normal. No oropharyngeal exudate.   Eyes: Conjunctivae and EOM are normal. Pupils are equal, round, and reactive to light. No scleral icterus.   Neck: Phonation normal. Neck supple. No JVD present. No muscular tenderness present. Carotid bruit is not present. No tracheal deviation present.   Cardiovascular: Normal rate, regular rhythm, S1 normal and S2 normal.    No murmur heard.  Pulses:       Radial pulses are 2+ on the right side, and 2+ on the left side.        Dorsalis pedis pulses are 2+ on the right side, and 2+ on the left side.   Pulmonary/Chest: Effort normal. No respiratory distress. He has decreased breath sounds in the right upper field and the left upper field. He has wheezes. He has no rales. He exhibits no tenderness and no crepitus.   Abdominal: Soft. Bowel sounds are normal. He exhibits no distension. There is no tenderness. There is no rebound and no guarding.   Musculoskeletal: He exhibits no edema or tenderness.   Lymphadenopathy:        Right  cervical: No superficial cervical adenopathy present.       Left cervical: No superficial cervical adenopathy present.        Right: No supraclavicular adenopathy present.        Left: No supraclavicular adenopathy present.   Neurological: He is alert and oriented to person, place, and time. He displays no tremor. He displays no seizure activity.   Skin: Skin is warm and dry. He is not diaphoretic. No cyanosis or erythema. No pallor. Nails show no clubbing.   Psychiatric: He has a normal mood and affect. His behavior is normal. Thought content normal.   Nursing note and vitals reviewed.        CRANIAL NERVES     CN III, IV, VI   Pupils are equal, round, and reactive to light.  Extraocular motions are normal.        Significant Labs:   ABGs:   Recent Labs  Lab 06/25/18  1206   PH 7.362   PCO2 47.3*   HCO3 26.8   POCSATURATED 99   BE 1     CBC:   Recent Labs  Lab 06/25/18  0738   WBC 5.95   HGB 15.4   HCT 46.4        CMP:   Recent Labs  Lab 06/25/18  0738      K 4.8      CO2 21*      BUN 13   CREATININE 0.8   CALCIUM 9.6   PROT 7.8   ALBUMIN 4.1   BILITOT 0.4   ALKPHOS 65   AST 22   ALT 18   ANIONGAP 9   EGFRNONAA >60     Cardiac Markers:   Recent Labs  Lab 06/25/18  0738   BNP 31     Coagulation:   Recent Labs  Lab 06/25/18  0738   INR 0.9     Troponin:   Recent Labs  Lab 06/25/18  0738   TROPONINI 0.008       Significant Imaging: CXR: I have reviewed all pertinent results/findings within the past 24 hours and my personal findings are:  Hyperexpanded lungs, no focal consoldiation or infiltrate  EKG: I have reviewed all pertinent results/findings within the past 24 hours and my personal findings are: NSR at 98 bpm, no ST-T wave changes.     Assessment/Plan:     * Chronic obstructive pulmonary disease with acute exacerbation    Acute respiratory failure with hypercapnia and hypoxia  Tobacco abuse  Off BiPAP now and on supplemental oxygen via NC. BiPAP prn for increased SOB or WOB. Wean oxygen  as able. Continue A/A nebs q 4 hrs, prednisone 40 mg daily, moxifloxacin 400 mg daily. Nicoderm 21 mg patch.     Dangers of cigarette smoking were reviewed with patient in detail for 10 minutes and patient was encouraged to quit. Nicotine replacement options were discussed.           Essential hypertension    Has been on HCTZ previously. Will monitor for now.           VTE Risk Mitigation     None             Rogelio Grant MD  Department of Hospital Medicine   [facility]

## 2018-06-25 NOTE — HPI
"Abhijit Marie is a 62 y.o. black man with hypertension, cigarette smoking, and reactive airway disease.  He lives in [place], [place].  He is .  He works for [employer].              He presented to [facility] on 6/25/18 with cough and progressive dyspnea for the past week.  He could not sleep due to being short of breath.  He had an episode of post-tussive emesis during the week.  He denied sick contacts or fever/chills.  He reported waking up at night many nights "gasping for air" but was able to go back to sleep usually after using his inhaler.  In the ED he required BiPAP until therapies had more time to take effect.  He was given IV methylprednisolone, a 1 hour continuous nebulizer treatment, and moxifloxacin.  He was admitted to [facility].    "

## 2018-06-25 NOTE — ED NOTES
APPEARANCE: Alert, oriented and in no acute distress.  PERIPHERAL VASCULAR: peripheral pulses present. Normal cap refill. No edema. Warm to touch.    GASTRO: soft, bowel sounds normal, no tenderness, no abdominal distention.  MUSC: Full ROM. No bony tenderness or soft tissue tenderness. No obvious deformity.  SKIN: Skin is warm and dry, normal skin turgor, mucous membranes moist.  NEURO: 5/5 strength major flexors/extensors bilaterally. Sensory intact to light touch bilaterally. [place] coma scale: eyes open spontaneously-4, oriented & converses-5, obeys commands-6. No neurological abnormalities.   MENTAL STATUS: awake, alert and aware of environment.  EYE: PERRL, both eyes: pupils brisk and reactive to light. Normal size.  ENT: EARS: no obvious drainage. NOSE: no active bleeding.

## 2018-06-25 NOTE — ASSESSMENT & PLAN NOTE
Acute respiratory failure with hypercapnia and hypoxia  Tobacco abuse  Off BiPAP now and on supplemental oxygen via NC. BiPAP prn for increased SOB or WOB. Wean oxygen as able. Continue A/A nebs q 4 hrs, prednisone 40 mg daily, moxifloxacin 400 mg daily. Nicoderm 21 mg patch.     Dangers of cigarette smoking were reviewed with patient in detail for 10 minutes and patient was encouraged to quit. Nicotine replacement options were discussed.

## 2018-06-25 NOTE — ED PROVIDER NOTES
Encounter Date: 6/25/2018       History     Chief Complaint   Patient presents with    Shortness of Breath     SOB since last night at rest. no relief with inhaler     Afebrile 62-year-old male with history of COPD and hypertension with medication noncompliance presents to the ED for evaluation of shortness of breath. He states that this shortness of breath worsened last night and has remained constant since onset.  He does report that it worsened today.  He states that he has been out of his nebulizer medications an oxygen for some time and hence was not able to try any alleviating medications.  He does report an increase in his cough but says that is dry in nature.  He complains of chest tightness, wheezing and shortness of breath presently.  He denies any fever, chills, chest pain, palpitations, nausea, vomiting or diaphoresis.  He states that he is not compliant with his blood pressure medication for some time.  He is a daily smoker with no recent hospitalization for respiratory distress.      The history is provided by the patient.     Review of patient's allergies indicates:  No Known Allergies  Past Medical History:   Diagnosis Date    COPD (chronic obstructive pulmonary disease)     Hypertension     Reactive airway disease 12/2/2014     History reviewed. No pertinent surgical history.  Family History   Problem Relation Age of Onset    Heart disease Mother     Heart disease Father      Social History   Substance Use Topics    Smoking status: Current Every Day Smoker     Packs/day: 0.50     Years: 40.00     Types: Cigarettes    Smokeless tobacco: Never Used    Alcohol use No     Review of Systems   Constitutional: Positive for activity change. Negative for chills and fever.   HENT: Negative for sore throat.    Respiratory: Positive for cough, chest tightness, shortness of breath and wheezing.    Cardiovascular: Negative for chest pain, palpitations and leg swelling.   Gastrointestinal: Negative for  abdominal pain, nausea and vomiting.   Genitourinary: Negative for flank pain.   Musculoskeletal: Negative for arthralgias, back pain and myalgias.   Skin: Negative for rash.   Neurological: Negative for dizziness, weakness, light-headedness and headaches.   Hematological: Does not bruise/bleed easily.       Physical Exam     Initial Vitals [06/25/18 0718]   BP Pulse Resp Temp SpO2   (!) 197/130 (!) 122 19 97.7 °F (36.5 °C) (!) 94 %      MAP       --         Physical Exam    Nursing note and vitals reviewed.  Constitutional: He appears well-developed and well-nourished. He is cooperative.  Non-toxic appearance. He does not appear ill. He appears distressed.   HENT:   Head: Normocephalic and atraumatic.   Eyes: Conjunctivae and lids are normal.   Neck: Normal range of motion. Neck supple. No stridor present.   Cardiovascular: Regular rhythm. Tachycardia present.    Pulmonary/Chest: Accessory muscle usage present. Tachypnea noted. He is in respiratory distress. He has wheezes.   Abdominal: Soft. Normal appearance. There is no tenderness.   Neurological: He is alert and oriented to person, place, and time. GCS eye subscore is 4. GCS verbal subscore is 5. GCS motor subscore is 6.   Skin: Skin is warm, dry and intact. No rash noted.   Psychiatric: He has a normal mood and affect. His speech is normal and behavior is normal. Thought content normal.         ED Course   Critical Care  Date/Time: 6/25/2018 11:43 AM  Performed by: JENNY JONES  Authorized by: JENNY JONES   Total critical care time (exclusive of procedural time) : 38 minutes  Critical care was necessary to treat or prevent imminent or life-threatening deterioration of the following conditions: respiratory failure.  Critical care was time spent personally by me on the following activities: blood draw for specimens, development of treatment plan with patient or surrogate, discussions with consultants, evaluation of patient's response to treatment,  examination of patient, obtaining history from patient or surrogate, ordering and performing treatments and interventions, ordering and review of laboratory studies, ordering and review of radiographic studies, pulse oximetry, re-evaluation of patient's condition and review of old charts.        Labs Reviewed   CBC W/ AUTO DIFFERENTIAL - Abnormal; Notable for the following:        Result Value    Eos # 0.6 (*)     Eosinophil% 9.6 (*)     All other components within normal limits   COMPREHENSIVE METABOLIC PANEL - Abnormal; Notable for the following:     CO2 21 (*)     All other components within normal limits   TROPONIN I   B-TYPE NATRIURETIC PEPTIDE   PROTIME-INR          Imaging Results          X-Ray Chest PA And Lateral (Final result)  Result time 06/25/18 07:51:37    Final result by Aj Rebolledo MD (06/25/18 07:51:37)                 Impression:      1. Radiographic findings suggestive of COPD.  No focal airspace opacities.      Electronically signed by: Aj Rebolledo MD  Date:    06/25/2018  Time:    07:51             Narrative:    EXAMINATION:  XR CHEST PA AND LATERAL    CLINICAL HISTORY:  Shortness of breath    TECHNIQUE:  PA and lateral views of the chest were performed.    COMPARISON:  Radiograph 07/16/2017.    FINDINGS:  Mediastinal structures are midline.  Cardiac silhouette is normal in size.  Lungs are overexpanded but symmetric.  No focal airspace opacity.  No pneumothorax or pleural effusions.  No free air beneath the diaphragm.  No acute osseous abnormalities.                                 Medical Decision Making:   Initial Assessment:   62-year-old male with history of COPD presents for evaluation of shortness of breath since last night.  Exam reveals patient in some moderate distress with noted tachypnea and accessory muscle usage.  He is nontoxic appearing.  He has diffuse wheeze.  Differential Diagnosis:   Differential Diagnosis includes, but is not limited to:  PE, MI/ACS,  pneumothorax, pericardial effusion/tamonade, pneumonia, lung abscess, pericarditis/myocarditis, pleural effusion, lung mass, CHF exacerbation, asthma exacerbation, COPD exacerbation, aspirated/ingested foreign body, airway obstruction, CO poisoning, anemia, metabolic derangement, allergy/atopy, influenza, viral URI, viral syndrome.    Clinical Tests:   Lab Tests: Ordered and Reviewed  Radiological Study: Ordered and Reviewed  Medical Tests: Ordered and Reviewed  ED Management:  Patient was noted to have moderate wheezing and a continuous nebulizer was ordered; labs ordered to assess with shortness of breath that began last night.  He had modest improvement with continuous nebulizer.  Labs and chest x-ray grossly unremarkable for any acute abnormalities.  As no consolidation noted on x-ray IV steroids ordered an additional nebulizer.  He did report some additional improvement in symptoms with no hypoxia however was noted to still be tachypneic and BiPAP was ordered.  As we are concerned for COPD exacerbation moxifloxacin was ordered.  Discussed the patient with [facility] hospitalist and he will be admitted for additional nebulizer treatment and observation.  Discussed the plan with the patient and he amenable.              Attending Attestation:     Physician Attestation Statement for NP/PA:   I have conducted a face to face encounter with this patient in addition to the NP/PA, due to NP/PA Request    Other NP/PA Attestation Additions:      Medical Decision Making: Patient is 63 y/o male with COPD here with SOB.  Tachycardic, tachypneic, nontoxic appearing.  + wheezing bilaterally.  Patient given multiple duonebs but still symptomatic.  Placed on BiPAP with improvement.  ED workup nondiagnostic.  Patient given steroids and moxifloxacin.  Will admit for further management                 ED Course as of Jun 25 1025   Mon Jun 25, 2018   0733 Sinus Tachycardia with rate 124 bpm, no STEMI,  ms, no TWIs  [LD]   0850  Patient's oxygen saturationIs remaining stable and the upper 90s however he still appears tachypneic in is exhibiting accessory muscle usage.  He reports that he feels like he is getting tired for breathing so heavily and patient will be placed on BiPAP at this time.  [LC]   1020 Repeat EKG shows NSR with rate of 98 bpm, no TWIs, No STEMI,  normal intervals, normal conduction.  [LD]   1024 A repeat EKGWas ordered as patient's rhythm on monitor was showing V-tach; EKG shows NSR  [LC]      ED Course User Index  [LC] SANJANA Moya  [LD] Shruthi Tirado MD     Clinical Impression:   The primary encounter diagnosis was COPD exacerbation. Diagnoses of SOB (shortness of breath) and Tachycardia were also pertinent to this visit.      Disposition:   Disposition: Admitted  Condition: Stable                            SANJANA Moya  06/25/18 1138       Shruthi Tirado MD  06/25/18 1143

## 2018-06-25 NOTE — SUBJECTIVE & OBJECTIVE
Past Medical History:   Diagnosis Date    COPD (chronic obstructive pulmonary disease)     Hypertension     Reactive airway disease 12/2/2014       History reviewed. No pertinent surgical history.    Review of patient's allergies indicates:  No Known Allergies    No current facility-administered medications on file prior to encounter.      Current Outpatient Prescriptions on File Prior to Encounter   Medication Sig    albuterol 90 mcg/actuation inhaler Inhale 1-2 puffs into the lungs every 6 (six) hours as needed for Wheezing.    hydrochlorothiazide (HYDRODIURIL) 25 MG tablet Take 1 tablet (25 mg total) by mouth once daily.    [DISCONTINUED] albuterol-ipratropium 2.5mg-0.5mg/3mL (DUO-NEB) 0.5 mg-3 mg(2.5 mg base)/3 mL nebulizer solution Take 3 mLs by nebulization every 6 (six) hours as needed for Wheezing. Rescue    [DISCONTINUED] budesonide (PULMICORT) 0.5 mg/2 mL nebulizer solution Take 2 mLs (0.5 mg total) by nebulization every 12 (twelve) hours.    [DISCONTINUED] nicotine (NICODERM CQ) 14 mg/24 hr Place 1 patch onto the skin once daily.     Family History     Problem Relation (Age of Onset)    Heart disease Mother, Father        Social History Main Topics    Smoking status: Current Every Day Smoker     Packs/day: 1.00     Years: 40.00     Types: Cigarettes    Smokeless tobacco: Never Used    Alcohol use No    Drug use: No    Sexual activity: Not on file     Review of Systems   Constitutional: Negative for appetite change, chills, fatigue and fever.   HENT: Negative for congestion, hearing loss, nosebleeds, sore throat and tinnitus.    Eyes: Negative for discharge, itching and visual disturbance.   Respiratory: Positive for cough, chest tightness and shortness of breath.    Cardiovascular: Positive for chest pain and palpitations.   Gastrointestinal: Negative for abdominal pain, blood in stool, diarrhea, nausea and vomiting.   Endocrine: Negative for cold intolerance and polydipsia.   Genitourinary:  Negative for difficulty urinating, dysuria, hematuria and urgency.   Musculoskeletal: Negative for arthralgias, myalgias and neck stiffness.   Skin: Negative for rash and wound.   Allergic/Immunologic: Negative for environmental allergies and immunocompromised state.   Neurological: Negative for dizziness, tremors, light-headedness and headaches.   Hematological: Does not bruise/bleed easily.   Psychiatric/Behavioral: Negative for agitation and confusion. The patient is not nervous/anxious.      Objective:     Vital Signs (Most Recent):  Temp: 97.7 °F (36.5 °C) (06/25/18 0718)  Pulse: 88 (06/25/18 1102)  Resp: (!) 22 (06/25/18 0837)  BP: (!) 167/108 (06/25/18 1102)  SpO2: 98 % (06/25/18 1102) Vital Signs (24h Range):  Temp:  [97.7 °F (36.5 °C)] 97.7 °F (36.5 °C)  Pulse:  [] 88  Resp:  [19-22] 22  SpO2:  [94 %-99 %] 98 %  BP: (156-197)/(107-130) 167/108     Weight: 86.2 kg (190 lb)  Body mass index is 25.77 kg/m².    Physical Exam   Constitutional: He is oriented to person, place, and time. He appears well-developed and well-nourished. He is cooperative. No distress. Nasal cannula in place.   HENT:   Head: Normocephalic and atraumatic.   Right Ear: External ear normal.   Left Ear: External ear normal.   Nose: No mucosal edema or sinus tenderness.   Mouth/Throat: Uvula is midline, oropharynx is clear and moist and mucous membranes are normal. No oropharyngeal exudate.   Eyes: Conjunctivae and EOM are normal. Pupils are equal, round, and reactive to light. No scleral icterus.   Neck: Phonation normal. Neck supple. No JVD present. No muscular tenderness present. Carotid bruit is not present. No tracheal deviation present.   Cardiovascular: Normal rate, regular rhythm, S1 normal and S2 normal.    No murmur heard.  Pulses:       Radial pulses are 2+ on the right side, and 2+ on the left side.        Dorsalis pedis pulses are 2+ on the right side, and 2+ on the left side.   Pulmonary/Chest: Effort normal. No  respiratory distress. He has decreased breath sounds in the right upper field and the left upper field. He has wheezes. He has no rales. He exhibits no tenderness and no crepitus.   Abdominal: Soft. Bowel sounds are normal. He exhibits no distension. There is no tenderness. There is no rebound and no guarding.   Musculoskeletal: He exhibits no edema or tenderness.   Lymphadenopathy:        Right cervical: No superficial cervical adenopathy present.       Left cervical: No superficial cervical adenopathy present.        Right: No supraclavicular adenopathy present.        Left: No supraclavicular adenopathy present.   Neurological: He is alert and oriented to person, place, and time. He displays no tremor. He displays no seizure activity.   Skin: Skin is warm and dry. He is not diaphoretic. No cyanosis or erythema. No pallor. Nails show no clubbing.   Psychiatric: He has a normal mood and affect. His behavior is normal. Thought content normal.   Nursing note and vitals reviewed.        CRANIAL NERVES     CN III, IV, VI   Pupils are equal, round, and reactive to light.  Extraocular motions are normal.        Significant Labs:   ABGs:   Recent Labs  Lab 06/25/18  1206   PH 7.362   PCO2 47.3*   HCO3 26.8   POCSATURATED 99   BE 1     CBC:   Recent Labs  Lab 06/25/18  0738   WBC 5.95   HGB 15.4   HCT 46.4        CMP:   Recent Labs  Lab 06/25/18  0738      K 4.8      CO2 21*      BUN 13   CREATININE 0.8   CALCIUM 9.6   PROT 7.8   ALBUMIN 4.1   BILITOT 0.4   ALKPHOS 65   AST 22   ALT 18   ANIONGAP 9   EGFRNONAA >60     Cardiac Markers:   Recent Labs  Lab 06/25/18  0738   BNP 31     Coagulation:   Recent Labs  Lab 06/25/18  0738   INR 0.9     Troponin:   Recent Labs  Lab 06/25/18  0738   TROPONINI 0.008       Significant Imaging: CXR: I have reviewed all pertinent results/findings within the past 24 hours and my personal findings are:  Hyperexpanded lungs, no focal consoldiation or infiltrate  EKG:  I have reviewed all pertinent results/findings within the past 24 hours and my personal findings are: NSR at 98 bpm, no ST-T wave changes.

## 2018-06-26 PROBLEM — J96.01 ACUTE RESPIRATORY FAILURE WITH HYPOXIA AND HYPERCAPNIA: Status: RESOLVED | Noted: 2018-01-01 | Resolved: 2018-01-01

## 2018-06-26 PROBLEM — J96.02 ACUTE RESPIRATORY FAILURE WITH HYPOXIA AND HYPERCAPNIA: Status: RESOLVED | Noted: 2018-01-01 | Resolved: 2018-01-01

## 2018-06-26 NOTE — DISCHARGE SUMMARY
"[facility]  Discharge Summary      Patient Name: Abhijit Marie  MRN: [id number]  Admission Date: 6/25/2018  Hospital Length of Stay: 1 days  Discharge Date and Time: 6/26/2018  3:20 PM  Attending Physician: Chai Mann MD   Discharging Provider: Chai Mann MD  Primary Care Provider: Primary Doctor No      HPI:   Abhijit Marie is a 62 y.o. black man with hypertension, cigarette smoking, and reactive airway disease.  He lives in [place], [place].  He is .  He works for [employer].              He presented to [facility] Emergency Department on 6/25/18 with cough and progressive dyspnea for the past week.  He could not sleep due to being short of breath.  He had an episode of post-tussive emesis during the week.  He denied sick contacts or fever/chills.  He reported waking up at night many nights "gasping for air" but was able to go back to sleep usually after using his inhaler.  In the ED he required BiPAP until therapies had more time to take effect.  He was given IV methylprednisolone, a 1 hour continuous nebulizer treatment, and moxifloxacin.  He was admitted to [facility].        Hospital Course:   He felt better the next morning and was discharged home with prescriptions for prednisone for 4 days and refills of albuterol inhaler and hydrochlorothiazide.  He does not get medical care in a clinic and has financial difficulties obtaining his medications.  He said he would quit smoking.     Consults:   Consults         Status Ordering Provider     Inpatient consult to Social Work  Once     Provider:  (Not yet assigned)    Ordered CHAI MANN        Final Active Diagnoses:    Diagnosis Date Noted POA    PRINCIPAL PROBLEM:  Chronic obstructive pulmonary disease with acute exacerbation [J44.1] 12/13/2015 Yes    Essential hypertension [I10] 12/13/2015 Yes     Chronic    Tobacco abuse [Z72.0] 12/02/2014 Yes     " Chronic    Reactive airway disease [J45.909] 12/02/2014 Yes     Chronic      Problems Resolved During this Admission:    Diagnosis Date Noted Date Resolved POA    Acute respiratory failure with hypoxia and hypercapnia [J96.01, J96.02] 06/25/2018 06/26/2018 Yes       Discharged Condition: good    Disposition: Home or Self Care    Follow Up:  Follow-up Information     Parrish Dillard PA-C On 7/10/2018.    Specialty:  Family Medicine  Why:  at 08:40 am--Please bring 2 proofs of income and picture ID. , Primary Care Physician  Contact information:  [address]  [address]  [place] [address]  [phone]                 Patient Instructions:     Ambulatory Referral to Pharmacy Assistance   Referral Priority: Routine Referral Type: Consultation   Referral Reason: Specialty Services Required    Number of Visits Requested: 1      Diet Adult Regular     Notify your health care provider if you experience any of the following:  difficulty breathing or increased cough     Activity as tolerated         Significant Diagnostic Studies:   X-Ray Chest PA And Lateral 6/25/18: FINDINGS:  Mediastinal structures are midline.  Cardiac silhouette is normal in size.  Lungs are overexpanded but symmetric.  No focal airspace opacity.  No pneumothorax or pleural effusions.  No free air beneath the diaphragm.  No acute osseous abnormalities.     Medications:  Reconciled Home Medications:      Medication List      START taking these medications    nicotine 21 mg/24 hr  Commonly known as:  NICODERM CQ  Place 1 patch onto the skin once daily. No prescription needed.     predniSONE 20 MG tablet  Commonly known as:  DELTASONE  Take 2 tablets (40 mg total) by mouth once daily. for 4 days  Start taking on:  6/27/2018        CONTINUE taking these medications    albuterol 90 mcg/actuation inhaler  Inhale 1-2 puffs into the lungs every 6 (six) hours as needed for Wheezing.     hydroCHLOROthiazide 25 MG tablet  Commonly known as:  HYDRODIURIL  Take  1 tablet (25 mg total) by mouth once daily.            Indwelling Lines/Drains at time of discharge: None  Time spent on the discharge of patient: 35 minutes  Patient was seen and examined on the date of discharge and determined to be suitable for discharge.         Chai Mann MD  Department of Hospital Medicine  [facility]

## 2018-06-26 NOTE — PLAN OF CARE
Reviewed discharge instructions and education with patient and spouse.  Both verbalizes understanding and agrees with follow-up plan.  Patient says he is going to stop smoking.  Given printed material on smoking cessation as well as verbal discussion.  Telemetry monitoring discontinued.  IV site discontinued by patient. Catheter intact. No swelling, redness or pain noted.  No acute distress noted. Pt discharged via wheelchair to out-patient pharmacy.

## 2018-06-26 NOTE — HOSPITAL COURSE
He felt better the next morning and was discharged home with prescriptions for prednisone for 4 days and refills of albuterol inhaler and hydrochlorothiazide.  He does not get medical care in a clinic and has financial difficulties obtaining his medications.  He said he would quit smoking.

## 2018-06-26 NOTE — PLAN OF CARE
TN went to meet with patient, family at bedside. Patient is independent and lives at home with his significant other. He does not have home health or medical equipment at home. Patient still drives. He does not have insurance, but he is medicaid pending. Patient would like TN to set him up at the [facility].      06/26/18 1143   Discharge Assessment   Assessment Type Discharge Planning Assessment   Confirmed/corrected address and phone number on facesheet? Yes   Assessment information obtained from? Patient   Expected Length of Stay (days) 1   Prior to hospitilization cognitive status: Alert/Oriented   Prior to hospitalization functional status: Independent   Current cognitive status: Alert/Oriented   Current Functional Status: Independent   Facility Arrived From: Home   Lives With significant other   Able to Return to Prior Arrangements yes   Is patient able to care for self after discharge? Yes   Who are your caregiver(s) and their phone number(s)? Joy Addison Significant Other [phone]    Patient's perception of discharge disposition home or selfcare   Readmission Within The Last 30 Days no previous admission in last 30 days   Equipment Currently Used at Home respiratory supplies  (inhalers)   Do you have any problems affording any of your prescribed medications? Yes   If yes, what medications? No insurance, inhalers, etc.   Does the patient have transportation home? Yes   Transportation Available family or friend will provide   Dialysis Name and Scheduled days N/A   Discharge Plan A Home with family   Discharge Plan B Home with family   Patient/Family In Agreement With Plan yes     Samantha Maciel RN  Transition Navigator  [phone]

## 2018-06-26 NOTE — PROGRESS NOTES
.Pharmacy New Medication Education    Patient accepted medication education.    Pharmacy educated patient on name and purpose of medications and possible side effects, using the teach-back method.     Current Inpatient Medication Orders   acetaminophen tablet 650 mg   albuterol-ipratropium 2.5 mg-0.5 mg/3 mL nebulizer solution 3 mL   enoxaparin injection 40 mg   fluticasone-vilanterol 100-25 mcg/dose diskus inhaler 1 puff   HYDROcodone-acetaminophen 5-325 mg per tablet 1 tablet   moxifloxacin tablet 400 mg   nicotine 21 mg/24 hr 1 patch   predniSONE tablet 40 mg   sodium chloride 0.9% flush 3 mL       Learners of pharmacy medication education included:  Patient    Patient +/- learner response:  Verbalized Understanding, Teachback

## 2018-06-26 NOTE — PLAN OF CARE
Problem: Patient Care Overview  Goal: Plan of Care Review  Outcome: Ongoing (interventions implemented as appropriate)  The pt has slept off and on during the night.  He is on telemetry running normal sinus rhythm in the 70s to 90s, without ectopy. VSS.  He continues to have inspiratory and expiratory wheezes.  He has had no complaints. He has had no falls this shift. His wife and daughters are at the bedside.

## 2018-06-26 NOTE — PLAN OF CARE
TN met with patient and family at bedside. Patient's prescriptions called into the [facility].     TN schedule hospital follow-up for patient.     Follow-up With  Details  Why  Contact Info   Parrish Dillard PA-C  On 7/10/2018  at 08:40 am--Please bring 2 proofs of income and picture ID. , Primary Care Physician  [address]  [address]  [place] [facility] [place] [address]  [phone]      06/26/18 1146   Final Note   Assessment Type Final Discharge Note   Discharge Disposition Home   What phone number can be called within the next 1-3 days to see how you are doing after discharge? [phone]   Hospital Follow Up  Appt(s) scheduled? Yes   Discharge plans and expectations educations in teach back method with documentation complete? Yes   Right Care Referral Info   Post Acute Recommendation No Care     Samantha Maciel RN  Transition Navigator  [phone]

## 2018-06-26 NOTE — PLAN OF CARE
TN referred patient to pharmacy assistance for discharge medications.     Samantha Maciel RN  Transition Navigator  [phone]

## 2018-06-26 NOTE — DISCHARGE INSTRUCTIONS
Hypertension, Established (English) View Edit Remove  Smoking Cessation (English) View Edit Remove  Smoking, Getting Support for Quitting (English) View Edit Remove  Smoking, Tips for Quitting (Cardiovascular) (English) View Edit Remove

## 2018-08-13 NOTE — ED NOTES
Patient states he has been having shortness of breath, onset 4 days ago.  States diagnosed with COPD and quit smoking one month ago.  Patient is extremely anxious

## 2018-08-13 NOTE — ED NOTES
Patient taken off bipap and is now on room air.  States he feels much better.  Advised to rest for the moment

## 2018-08-13 NOTE — ED PROVIDER NOTES
Encounter Date: 8/12/2018    SCRIBE #1 NOTE: I, Rosa Isela Serra, am scribing for, and in the presence of,  Dr. Lefort. I have scribed the entire note.       History     Chief Complaint   Patient presents with    Wheezing     x4 days with pain across chest, hx of emphysema     Abhijit Marie is a 62 y.o. male who  has a past medical history of COPD (chronic obstructive pulmonary disease), Hypertension, and Reactive airway disease (12/2/2014).    The patient presents to the ED due to shortness of breath. He reports onset of symptoms was 4 days ago. The patient notes associated cough, congestion, runny nose and headache but denies any chest pain, fever or chills. He believes the recent cold symptoms caused him to have difficulty breathing. The patient reports he has been using his medications with no improvement in symptoms. As per medical record the patient has history of recent admission for COPD exacerbation, June 2018.       The history is provided by the patient.     Review of patient's allergies indicates:  No Known Allergies  Past Medical History:   Diagnosis Date    COPD (chronic obstructive pulmonary disease)     Hypertension     Reactive airway disease 12/2/2014     History reviewed. No pertinent surgical history.  Family History   Problem Relation Age of Onset    Heart disease Mother     Heart disease Father      Social History     Tobacco Use    Smoking status: Current Every Day Smoker     Packs/day: 1.00     Years: 40.00     Pack years: 40.00     Types: Cigarettes    Smokeless tobacco: Never Used   Substance Use Topics    Alcohol use: No    Drug use: No     Review of Systems   Constitutional: Negative for chills and fever.   HENT: Positive for congestion and rhinorrhea. Negative for ear pain and sore throat.    Respiratory: Positive for cough and shortness of breath. Negative for wheezing.    Cardiovascular: Negative for chest pain and palpitations.   Gastrointestinal: Negative for abdominal  pain, diarrhea, nausea and vomiting.   Genitourinary: Negative for dysuria and hematuria.   Musculoskeletal: Negative for back pain, myalgias and neck pain.   Skin: Negative for rash.   Neurological: Positive for headaches. Negative for dizziness, weakness and light-headedness.   Psychiatric/Behavioral: Negative for confusion.       Physical Exam     Initial Vitals [08/12/18 1819]   BP Pulse Resp Temp SpO2   (!) 183/109 103 (!) 24 98.3 °F (36.8 °C) (!) 94 %      MAP       --         Physical Exam    Nursing note and vitals reviewed.  Constitutional: He appears well-developed and well-nourished. He is not diaphoretic. No distress.   HENT:   Head: Normocephalic and atraumatic.   Mouth/Throat: Oropharynx is clear and moist.   Eyes: Conjunctivae and EOM are normal.   Neck: Normal range of motion. Neck supple.   Cardiovascular: Regular rhythm and normal heart sounds. Exam reveals no gallop and no friction rub.    No murmur heard.  Mildly tachycardiac   Pulmonary/Chest: He is in respiratory distress. He has wheezes. He has no rhonchi. He has no rales.   Diffuse inspiratory and expiratory wheezing. Retractions   Musculoskeletal: Normal range of motion. He exhibits no edema or tenderness.   No LE pitting edema.    Lymphadenopathy:     He has no cervical adenopathy.   Neurological: He is alert and oriented to person, place, and time. He has normal strength.   Skin: Skin is warm and dry. No rash noted.         ED Course   Critical Care  Date/Time: 8/12/2018 9:30 PM  Performed by: Guy J. Lefort, MD  Authorized by: Guy J. Lefort, MD   Direct patient critical care time: 10 minutes  Additional history critical care time: 5 minutes  Ordering / reviewing critical care time: 5 minutes  Documentation critical care time: 5 minutes  Consulting other physicians critical care time: 5 minutes  Consult with family critical care time: 5 minutes  Total critical care time (exclusive of procedural time) : 35 minutes  Critical care time was  exclusive of separately billable procedures and treating other patients and teaching time.  Critical care was necessary to treat or prevent imminent or life-threatening deterioration of the following conditions: respiratory failure, metabolic crisis and cardiac failure.  Critical care was time spent personally by me on the following activities: development of treatment plan with patient or surrogate, interpretation of cardiac output measurements, evaluation of patient's response to treatment, examination of patient, obtaining history from patient or surrogate, ordering and performing treatments and interventions, ordering and review of laboratory studies, ordering and review of radiographic studies, pulse oximetry, re-evaluation of patient's condition and review of old charts.        Labs Reviewed   CBC W/ AUTO DIFFERENTIAL - Abnormal; Notable for the following components:       Result Value    Eos # 0.9 (*)     Eosinophil% 11.7 (*)     All other components within normal limits   ISTAT PROCEDURE - Abnormal; Notable for the following components:    POC PH 7.096 (*)     POC PCO2 93.2 (*)     POC PO2 103 (*)     POC HCO3 28.7 (*)     POC SATURATED O2 94 (*)     POC TCO2 32 (*)     All other components within normal limits   COMPREHENSIVE METABOLIC PANEL   TROPONIN I   B-TYPE NATRIURETIC PEPTIDE     EKG Readings: (Independently Interpreted)   Sinus tachycardia with occasional PVC's present, rate of 101. Right atrial enlargement. No STEMI       Imaging Results          X-Ray Chest AP Portable (Final result)  Result time 08/12/18 20:01:45    Final result by New Govea MD (08/12/18 20:01:45)                 Impression:      Chronic findings suggestive of COPD.  No detrimental change.  Consider follow-up with outpatient low-dose lung screening CT if the patient has a greater than 30 pack-year smoking history and meets screening guidelines.      Electronically signed by: New Govea  "MD  Date:    08/12/2018  Time:    20:01             Narrative:    EXAMINATION:  XR CHEST AP PORTABLE    CLINICAL HISTORY:  Provided history is "CHF;  ".    TECHNIQUE:  One view of the chest.    COMPARISON:  06/25/2018.    FINDINGS:  The upper abdomen and lower lung fields are partially excluded from the field of view.  Cardiac silhouette is not enlarged.  Lungs are hyperinflated and there are coarsened interstitial lung markings.  No large focal consolidation is identified on this single view.  No sizable pleural effusion. No pneumothorax.                                 Medical Decision Making:   Differential Diagnosis:   Not lmiited to COPD, PNA, PTX, PE, MI CHF    Independently Interpreted Test(s):   I have ordered and independently interpreted EKG Reading(s) - see prior notes  Clinical Tests:   Lab Tests: Ordered and Reviewed  Radiological Study: Ordered and Reviewed  Medical Tests: Ordered and Reviewed  ED Management:  Pt placed on bipap for severity of symptoms, ABG.  Resolution to baseline with no distress, increased WOB, and subjective resolution.  Pt states needs medication refill as he is out has ability to pay for medicine, discussed return precautions and importance of follow up.                      Clinical Impression:     1. COPD exacerbation        Disposition:   Disposition: Discharged  Condition: Stable    Scribe attestation I, Dr. Guy Lefort, personally performed the services described in this documentation. All medical record entries made by the scribe were at my direction and in my presence. I have reviewed the chart and agree that the record reflects my personal performance and is accurate and complete. Guy Lefort, MD.  9:53 PM 08/12/2018                      Guy J. Lefort, MD  08/12/18 2159    "

## 2018-10-11 PROBLEM — J44.1 COPD EXACERBATION: Status: ACTIVE | Noted: 2018-01-01

## 2018-10-11 NOTE — ED NOTES
Neuro: AAOx3  HEENT: WDL  Resp: Airway patent, tachypnea. SOB reported since Monday. Pt also reports cough and headache. Wheezing noted to all lung fields. Pt states he has emphysema and still continues to smoke.   Cardiac: Skin pink/warm/dry, pulses intact. Pt denies any chest pain.  Abdomen: Soft, non-tender to palpation, denies N/V/D  : No signs or symptoms of urinary disturbances  Musculoskeletal: Moves all extremities equally, ROM intact  Skin: Skin is dry and intact.

## 2018-10-11 NOTE — ED PROVIDER NOTES
Encounter Date: 10/11/2018    SCRIBE #1 NOTE: I, Anthony Olivares, am scribing for, and in the presence of,  Dr. Lefort. I have scribed the entire note.       History     Chief Complaint   Patient presents with    Shortness of Breath     Onset Monday with cough and headache, Hx of emphysema, tripoding in triage     Time seen by provider: 12:00 PM    This is a 62 y.o. male with PMHx of reactive airway disease, COPD, and HTN who presents with SOB x 3 days with productive cough with clear sputum. He reports that he woke today with a mild HA which progressed to a pounding HA. Nursing staff reports patient in tripod position at triage. Patient denies any leg swelling, calf pain, CP, or any other concerning symptoms. The patient denies a history of recurrent HA's. He is a current smoker.      The history is provided by the patient.     Review of patient's allergies indicates:  No Known Allergies  Past Medical History:   Diagnosis Date    COPD (chronic obstructive pulmonary disease)     Hypertension     Reactive airway disease 12/2/2014     History reviewed. No pertinent surgical history.  Family History   Problem Relation Age of Onset    Heart disease Mother     Heart disease Father      Social History     Tobacco Use    Smoking status: Current Every Day Smoker     Packs/day: 1.00     Years: 40.00     Pack years: 40.00     Types: Cigarettes    Smokeless tobacco: Never Used   Substance Use Topics    Alcohol use: No    Drug use: No     Review of Systems   Constitutional: Negative for chills and fever.   HENT: Negative for facial swelling and trouble swallowing.    Eyes: Negative for redness.   Respiratory: Positive for cough and shortness of breath.    Cardiovascular: Negative for chest pain.   Gastrointestinal: Negative for abdominal pain, diarrhea, nausea and vomiting.   Genitourinary: Negative for dysuria and hematuria.   Musculoskeletal: Negative for gait problem.   Skin: Negative for rash.   Neurological: Positive  for headaches. Negative for facial asymmetry and speech difficulty.     Physical Exam     Initial Vitals [10/11/18 1145]   BP Pulse Resp Temp SpO2   (!) 162/98 (!) 111 (!) 28 98.5 °F (36.9 °C) 95 %      MAP       --         Physical Exam    Nursing note and vitals reviewed.  Constitutional: He appears well-developed and well-nourished. He is not diaphoretic. No distress.   HENT:   Head: Normocephalic and atraumatic.   Mouth/Throat: Oropharynx is clear and moist.   Eyes: Conjunctivae and EOM are normal.   Neck: Normal range of motion. Neck supple. No JVD present.   No meningismus   Cardiovascular: Normal rate, regular rhythm and normal heart sounds.   Pulmonary/Chest: He is in respiratory distress.   Tachypnea  Inspiratory and expiratory wheezing  Wet cough   Abdominal: Soft. There is no tenderness.   Musculoskeletal: Normal range of motion. He exhibits no edema or tenderness.   No LE edema   Neurological: He is alert and oriented to person, place, and time. He has normal strength. No cranial nerve deficit or sensory deficit.   Normal neuro exam   Skin: Skin is warm and dry.       ED Course   Procedures  Labs Reviewed   CBC W/ AUTO DIFFERENTIAL - Abnormal; Notable for the following components:       Result Value    Lymph # 0.7 (*)     Gran% 80.7 (*)     Lymph% 8.6 (*)     All other components within normal limits   COMPREHENSIVE METABOLIC PANEL - Abnormal; Notable for the following components:    Glucose 117 (*)     Anion Gap 6 (*)     All other components within normal limits   TROPONIN I - Abnormal; Notable for the following components:    Troponin I 0.032 (*)     All other components within normal limits   B-TYPE NATRIURETIC PEPTIDE     EKG Readings: (Independently Interpreted)   Initial Reading: No STEMI. Rhythm: Sinus Tachycardia. Heart Rate: 116.   Right atrial enlargement     X-Rays:   Independently Interpreted Readings:   Other Readings:  Reviewed by myself, read by radiology.     Imaging Results           X-Ray Chest AP Portable (Final result)  Result time 10/11/18 13:44:16    Final result by Slim Rothman MD (10/11/18 13:44:16)                 Impression:      Chronic findings suggestive of COPD without detrimental change or radiographic acute intrathoracic process seen.  No focal consolidation.      Electronically signed by: Slim Rothman MD  Date:    10/11/2018  Time:    13:44             Narrative:    EXAMINATION:  XR CHEST AP PORTABLE    CLINICAL HISTORY:  CHF;    TECHNIQUE:  Frontal view of the chest was performed.    COMPARISON:  Chest radiograph 08/12/2018    FINDINGS:  Monitoring leads overlie the chest.  The lungs are symmetrically hyperinflated with bilateral diffuse nonspecific interstitial coarsening similar to prior.  No large consolidation, pleural effusion or pneumothorax.  Heart is not enlarged.  Calcific atherosclerosis with grossly stable mediastinal contours and hilar regions.  No acute osseous process seen.                              Medical Decision Making:   Clinical Tests:   Lab Tests: Ordered and Reviewed  Radiological Study: Ordered and Reviewed  Medical Tests: Ordered and Reviewed  ED Management:  Failed ambulation with dyspnea returning.  Wheezing continues.    1635  Spoke with Dr. Grant, who will accept the patient for observation.                      Clinical Impression:     1. COPD exacerbation    2. Tobacco abuse        Disposition:   Disposition: Placed in Observation     Scribe attestation I, Dr. Guy Lefort, personally performed the services described in this documentation. All medical record entries made by the scribe were at my direction and in my presence. I have reviewed the chart and agree that the record reflects my personal performance and is accurate and complete. Guy Lefort, MD.  5:06 PM 10/11/2018       Guy J. Lefort, MD  10/11/18 7763

## 2018-10-12 NOTE — ED NOTES
Dr. Mann called and notified of increased respiratory rate, decreased saturation and request to speak with MD.  New orders given and instructed to continue with admit as ordered.

## 2018-10-12 NOTE — NURSING
0420 Report received from MARY Velasquez  0443 Pt arrived to floor via bed on telemetry monitor and non rebreather in place. Pt appears dyspneic and tachypnic. Respiratory at bedside and placed pt on bipap.  Pt AAOx4 in apparent distress. Upon placing pt on monitor, 's, SBP > 200, pox 92% on bipap. Pt care assumed, will continue to monitor.     0500 E ICU called to bedside concerning pt's condition. PRN hydralazine orderd for SBP >180, hydralazine administered as ordered. One time dose of 0.5 morphine IVP ordered and given as well. Chest xray along with another breathing tx ordered. Orders followed through. Will continue to monitor.     0630 Pt resting in tripod position. Pt still ST on the monitor. RR 25-27 bpm. Pox 94-96% on bipap.  's/80's  at this time.

## 2018-10-12 NOTE — SUBJECTIVE & OBJECTIVE
Past Medical History:   Diagnosis Date    COPD (chronic obstructive pulmonary disease)     Hypertension     Reactive airway disease 12/2/2014       History reviewed. No pertinent surgical history.    Review of patient's allergies indicates:  No Known Allergies    No current facility-administered medications on file prior to encounter.      Current Outpatient Medications on File Prior to Encounter   Medication Sig    albuterol (PROVENTIL) 2.5 mg /3 mL (0.083 %) nebulizer solution Take 3 mLs (2.5 mg total) by nebulization every 6 (six) hours as needed for Wheezing. Rescue    albuterol 90 mcg/actuation inhaler Inhale 1-2 puffs into the lungs every 6 (six) hours as needed for Wheezing.    hydroCHLOROthiazide (HYDRODIURIL) 25 MG tablet Take 1 tablet (25 mg total) by mouth once daily.    nicotine (NICODERM CQ) 21 mg/24 hr Place 1 patch onto the skin once daily. No prescription needed.     Family History     Problem Relation (Age of Onset)    Heart disease Mother, Father        Tobacco Use    Smoking status: Current Every Day Smoker     Packs/day: 1.00     Years: 40.00     Pack years: 40.00     Types: Cigarettes    Smokeless tobacco: Never Used   Substance and Sexual Activity    Alcohol use: No    Drug use: No    Sexual activity: Not on file     Review of Systems   Unable to perform ROS: Acuity of condition   Constitutional: Positive for fatigue. Negative for activity change, chills and fever.   HENT: Negative for congestion and rhinorrhea.    Respiratory: Positive for cough, chest tightness, shortness of breath and wheezing.    Cardiovascular: Negative for chest pain.   Gastrointestinal: Negative for abdominal pain and constipation.   Genitourinary: Negative for decreased urine volume and difficulty urinating.   Musculoskeletal: Negative for arthralgias and myalgias.   Neurological: Negative for syncope.     Objective:     Vital Signs (Most Recent):  Temp: 98.1 °F (36.7 °C) (10/11/18 1945)  Pulse: 98 (10/11/18  1921)  Resp: (!) 23 (10/11/18 1921)  BP: (!) 172/98 (10/11/18 1921)  SpO2: 95 % (10/11/18 1921) Vital Signs (24h Range):  Temp:  [98.1 °F (36.7 °C)-98.5 °F (36.9 °C)] 98.1 °F (36.7 °C)  Pulse:  [] 98  Resp:  [16-36] 23  SpO2:  [93 %-97 %] 95 %  BP: (147-172)/() 172/98     Weight: 90.7 kg (200 lb)  Body mass index is 25.68 kg/m².    Physical Exam   Constitutional: He is oriented to person, place, and time. He appears well-developed and well-nourished. He appears distressed.   HENT:   Head: Normocephalic and atraumatic.   Cardiovascular: Regular rhythm. Tachycardia present.   Pulmonary/Chest: Accessory muscle usage present. Tachypnea noted. He is in respiratory distress. He has wheezes.   Abdominal: Soft. Bowel sounds are normal. There is no tenderness.   Musculoskeletal: He exhibits no edema.   Neurological: He is alert and oriented to person, place, and time.   Skin: Skin is warm. He is not diaphoretic.   Psychiatric: He has a normal mood and affect. His behavior is normal.           Significant Labs:   CBC:   Recent Labs   Lab  10/11/18   1241   WBC  8.50   HGB  15.4   HCT  46.2   PLT  282     CMP:   Recent Labs   Lab  10/11/18   1241   NA  140   K  3.8   CL  105   CO2  29   GLU  117*   BUN  11   CREATININE  1.1   CALCIUM  9.8   PROT  7.5   ALBUMIN  4.0   BILITOT  0.6   ALKPHOS  68   AST  16   ALT  14   ANIONGAP  6*   EGFRNONAA  >60       Significant Imaging: I have reviewed all pertinent imaging results/findings within the past 24 hours.   Imaging Results          X-Ray Chest AP Portable (Final result)  Result time 10/11/18 13:44:16    Final result by Slim Rothman MD (10/11/18 13:44:16)                 Impression:      Chronic findings suggestive of COPD without detrimental change or radiographic acute intrathoracic process seen.  No focal consolidation.      Electronically signed by: Slim Rothman MD  Date:    10/11/2018  Time:    13:44             Narrative:    EXAMINATION:  XR CHEST AP  PORTABLE    CLINICAL HISTORY:  CHF;    TECHNIQUE:  Frontal view of the chest was performed.    COMPARISON:  Chest radiograph 08/12/2018    FINDINGS:  Monitoring leads overlie the chest.  The lungs are symmetrically hyperinflated with bilateral diffuse nonspecific interstitial coarsening similar to prior.  No large consolidation, pleural effusion or pneumothorax.  Heart is not enlarged.  Calcific atherosclerosis with grossly stable mediastinal contours and hilar regions.  No acute osseous process seen.

## 2018-10-12 NOTE — ED NOTES
Attempted to call report on patient. RN called to give report to the nurse that was assigned patient and was told that the patient was being placed in a different room and when RN called that extension the nurse did not answer the phone.

## 2018-10-12 NOTE — ED NOTES
Dr. Mann notified of vitals and new orders placed. He stated to give patient medication and then send him upstairs.

## 2018-10-12 NOTE — PROGRESS NOTES
SANJANA Stein at the bedside. I spoke with her re: productive cough with thick green sputum. Mr. Marie states that he is feeling better.

## 2018-10-12 NOTE — PROGRESS NOTES
Pt found on BiPAP 93%. 10/5. 4LPM. Increased to 5lpm. Informed and explained to Nurse that 93% was acceptable for COPD. Pt is dyspneic on exertion and wheezing is heard upon ascultation.  Continuous breathing treatment recommended by RT. Pt continues to deny any SOB/Anxiety. Will continue to monitor.

## 2018-10-12 NOTE — PLAN OF CARE
Problem: Patient Care Overview  Goal: Plan of Care Review  Outcome: Ongoing (interventions implemented as appropriate)  Assessment and Plan  Nutrition Problem  increased nutrient (energy) needs     Related to (etiology):   COPD     Signs and Symptoms (as evidenced by):   Pt need for continuous Bipap      Interventions/Recommendations (treatment strategy):  See recs      Nutrition Diagnosis Status:   New    Recommendations  Recommendation/Intervention:   1. Recommend Cardiac diet. Please Re-consult if nutrition support recs needs.    2. RD to follow.     Goals: Pt able to obtain nutrition by RD f/u  Nutrition Goal Status: new

## 2018-10-12 NOTE — PLAN OF CARE
Problem: Patient Care Overview  Goal: Plan of Care Review  Outcome: Ongoing (interventions implemented as appropriate)  Patient arrived to unit very short of breath, on 2L NC. BP high and tachycardic. Patient denied any pain. SBP in 207. O2 upon arrival was 84%, reaching 90% at some points then dropping. Patient could not catch breath. Paged [facility].   -Patient placed on Bipap, O2 stats went up to 96%. ABG showed mixed results. BP lowered to 181 SBP.   Patient in bed with bed alarm on, non slip yellow socks and fall risk band. Has on ID band, bed is in lowest position and locked. Call light within reach. Plan of care reviewed with patient.  NSR and ST on telemetry. Patient denied any pain for the shift. Ambulated with standby assistance. Patient received PRN breathing treatments. Will continue to monitor.  -Update: Patient's o2 stats dropping to high 70's and HR reached 157. Paged [facility]. Ordered ICU transfer.   ICU [facility] assessed patient. Tried different path of treatment, gave 1 PO ativan.   -At 4:00 patient still struggling to breathe, looks to be getting tired. Paged [facility] about pending transfer and for updates to see if he still wants to go through with it. No reply. Paged again 15 minutes later. No reply. Let charge nurse know. BP remained in the 200s.

## 2018-10-12 NOTE — ASSESSMENT & PLAN NOTE
Current smoker presenting with wheezing and SOB. Hypoxic on exertion despite nebulizers.   -Supplemental oxygen, wean as tolerated  -Albuterol neb stat, Duonebs q4h  -Prednisone 40 mg daily  -Azithromycin   -Encourage smoking cessation

## 2018-10-12 NOTE — EICU
New admit    63 y/o M history of COPD and HTN initially admitted to the floor after presenting with shortness of breath initiated on therapy for COPD exacerbation including antibiotics and placed on Bipap.  He remains tachypneic and noted to be hypertensive 214/124 and was transferred to the ICU.    He was in the hospital last August for the same reason.    Camera assessment:  Patient in respiratory distress, sitting up on bipap 12/6 FiO2 40% TV 800s  tachypneic with use of accessory muscles, responsive  Reportedly wheezing on exam despite nebulization    Telemetry:  /135, HR 110s, O2 92%    · COPD exacerbation, give due dose of solumedrol 125  · Ordered CXR  · Ordered hydralazine 5 mg IV q 8 prn for SBP > 170  · Ordered morphine 0.5 mg IV x 1 to help alleviate sensation of dyspnea

## 2018-10-12 NOTE — PLAN OF CARE
Problem: Patient Care Overview  Goal: Plan of Care Review  Outcome: Ongoing (interventions implemented as appropriate)  Patient on oxygen with documented flow via BIPAP vision.  Will attempt to wean per O2 order protocol. The proper method of use, as well as anticipated side effects, of this aerosol treatment are discussed and demonstrated to the patient. Will continue to monitor.

## 2018-10-12 NOTE — H&P
[facility] Hospital Medicine  History & Physical    Patient Name: Abhijit Marie  MRN: [id number]  Admission Date: 10/11/2018  Attending Physician: Guy J. Lefort, MD   Primary Care Provider: Primary Doctor No         Patient information was obtained from patient, past medical records and ER records.     Subjective:     Principal Problem:Chronic obstructive pulmonary disease with acute exacerbation    Chief Complaint:   Chief Complaint   Patient presents with    Shortness of Breath     Onset Monday with cough and headache, Hx of emphysema, tripoding in triage        HPI: Mr. Marie is a 61 yo man with HTN and COPD who presents today with COPD exacerbation. History is limited d/t pt's respiratory distress. He reports that his symptoms began Monday with SOB and he took off work for three days. Today however he went in to work and overexerted himself. He presented to the ED, where he was in respiratory distress. O2 sats were in the 80s with exertion, and HR and BP were elevated. Troponin was slightly elevated at 0.032. All other labs were WNL. CXR showed changes of COPD. He was given 2 nebulizer treatments, steroid IV, and fioricet for headache. His room air sats did not improve despite treatment. He was admitted to hospital medicine.    Past Medical History:   Diagnosis Date    COPD (chronic obstructive pulmonary disease)     Hypertension     Reactive airway disease 12/2/2014       History reviewed. No pertinent surgical history.    Review of patient's allergies indicates:  No Known Allergies    No current facility-administered medications on file prior to encounter.      Current Outpatient Medications on File Prior to Encounter   Medication Sig    albuterol (PROVENTIL) 2.5 mg /3 mL (0.083 %) nebulizer solution Take 3 mLs (2.5 mg total) by nebulization every 6 (six) hours as needed for Wheezing. Rescue    albuterol 90 mcg/actuation inhaler Inhale 1-2 puffs into the lungs every 6 (six) hours  as needed for Wheezing.    hydroCHLOROthiazide (HYDRODIURIL) 25 MG tablet Take 1 tablet (25 mg total) by mouth once daily.    nicotine (NICODERM CQ) 21 mg/24 hr Place 1 patch onto the skin once daily. No prescription needed.     Family History     Problem Relation (Age of Onset)    Heart disease Mother, Father        Tobacco Use    Smoking status: Current Every Day Smoker     Packs/day: 1.00     Years: 40.00     Pack years: 40.00     Types: Cigarettes    Smokeless tobacco: Never Used   Substance and Sexual Activity    Alcohol use: No    Drug use: No    Sexual activity: Not on file     Review of Systems   Unable to perform ROS: Acuity of condition   Constitutional: Positive for fatigue. Negative for activity change, chills and fever.   HENT: Negative for congestion and rhinorrhea.    Respiratory: Positive for cough, chest tightness, shortness of breath and wheezing.    Cardiovascular: Negative for chest pain.   Gastrointestinal: Negative for abdominal pain and constipation.   Genitourinary: Negative for decreased urine volume and difficulty urinating.   Musculoskeletal: Negative for arthralgias and myalgias.   Neurological: Negative for syncope.     Objective:     Vital Signs (Most Recent):  Temp: 98.1 °F (36.7 °C) (10/11/18 1945)  Pulse: 98 (10/11/18 1921)  Resp: (!) 23 (10/11/18 1921)  BP: (!) 172/98 (10/11/18 1921)  SpO2: 95 % (10/11/18 1921) Vital Signs (24h Range):  Temp:  [98.1 °F (36.7 °C)-98.5 °F (36.9 °C)] 98.1 °F (36.7 °C)  Pulse:  [] 98  Resp:  [16-36] 23  SpO2:  [93 %-97 %] 95 %  BP: (147-172)/() 172/98     Weight: 90.7 kg (200 lb)  Body mass index is 25.68 kg/m².    Physical Exam   Constitutional: He is oriented to person, place, and time. He appears well-developed and well-nourished. He appears distressed.   HENT:   Head: Normocephalic and atraumatic.   Cardiovascular: Regular rhythm. Tachycardia present.   Pulmonary/Chest: Accessory muscle usage present. Tachypnea noted. He is in  respiratory distress. He has wheezes.   Abdominal: Soft. Bowel sounds are normal. There is no tenderness.   Musculoskeletal: He exhibits no edema.   Neurological: He is alert and oriented to person, place, and time.   Skin: Skin is warm. He is not diaphoretic.   Psychiatric: He has a normal mood and affect. His behavior is normal.           Significant Labs:   CBC:   Recent Labs   Lab  10/11/18   1241   WBC  8.50   HGB  15.4   HCT  46.2   PLT  282     CMP:   Recent Labs   Lab  10/11/18   1241   NA  140   K  3.8   CL  105   CO2  29   GLU  117*   BUN  11   CREATININE  1.1   CALCIUM  9.8   PROT  7.5   ALBUMIN  4.0   BILITOT  0.6   ALKPHOS  68   AST  16   ALT  14   ANIONGAP  6*   EGFRNONAA  >60       Significant Imaging: I have reviewed all pertinent imaging results/findings within the past 24 hours.   Imaging Results          X-Ray Chest AP Portable (Final result)  Result time 10/11/18 13:44:16    Final result by Slim Rothman MD (10/11/18 13:44:16)                 Impression:      Chronic findings suggestive of COPD without detrimental change or radiographic acute intrathoracic process seen.  No focal consolidation.      Electronically signed by: Slim Rothman MD  Date:    10/11/2018  Time:    13:44             Narrative:    EXAMINATION:  XR CHEST AP PORTABLE    CLINICAL HISTORY:  CHF;    TECHNIQUE:  Frontal view of the chest was performed.    COMPARISON:  Chest radiograph 08/12/2018    FINDINGS:  Monitoring leads overlie the chest.  The lungs are symmetrically hyperinflated with bilateral diffuse nonspecific interstitial coarsening similar to prior.  No large consolidation, pleural effusion or pneumothorax.  Heart is not enlarged.  Calcific atherosclerosis with grossly stable mediastinal contours and hilar regions.  No acute osseous process seen.                                  Assessment/Plan:     * Chronic obstructive pulmonary disease with acute exacerbation    Current smoker presenting with wheezing and SOB.  Hypoxic on exertion despite nebulizers.   -Supplemental oxygen, wean as tolerated  -Albuterol neb stat, Duonebs q4h  -Prednisone 40 mg daily  -Azithromycin   -Encourage smoking cessation        Essential hypertension    -172.   -Continue home HCTZ   -Monitor        Tobacco abuse    -Nicotine patch  -Encourage           VTE Risk Mitigation (From admission, onward)    None             Shanel Donato PA-C  Department of Hospital Medicine   [facility]

## 2018-10-12 NOTE — NURSING
Patient arrived to unit very short of breath, on 2L NC. BP high and tachycardic. Patient denied any pain. O2 upon arrival was 84%, reaching 90% at some points then dropping. Patient could not catch breath. Paged [facility].

## 2018-10-12 NOTE — PROGRESS NOTES
RT spoke with SANJANA Barry re: hour long breathing treatment d/t insp and exp wheezing, labored breathing, and use of accessory muscles. I spoke with SANJANA Barry and requested some Ativan IVP to decrease anxiety and promote easier breathing.

## 2018-10-12 NOTE — HPI
Abhijit Marie is a 62 y.o. black man with hypertension, cigarette smoking, and chronic obstructive pulmonary disease.  He lives in [place], [place].  He is .  He works for [employer].              He presented to [facility] Emergency [facility] on Thursday 10/11/18 with respiratory distress.  He started becoming short of breath on Monday 10/8/18 and took off work for 3 days, then returned to work and overexerted himself.  In the ED, oxygen saturations were in the 80s with exertion, and pulse and blood pressure were elevated.  Troponin was slightly elevated at 0.032.  All other labs were within normal limits.  Chest X-ray showed changes of COPD.  He was given 2 nebulizer treatments, IV methylprednisolone, and Fioricet for headache.  His room air oxygen saturations did not improve despite treatment.  He was admitted to [facility].

## 2018-10-12 NOTE — HOSPITAL COURSE
He continued to have labored work of breathing and was placed on BiPA and transferred to the ICU.  He received another hour long nebulizer treatment with improvement.  He was weaned to high flow nasal cannula.  Respiratory culture showed normal respiratory lyubov x 2.  Flu swab was negative.  Cetirizine was added.  He required intermittent BiPAP on the first two days of the hospitalization.  He was transferred out of the ICU on 10/15/18.  Smoking cessation was discussed again, as it had been during his last hospitalization for COPD exacerbation.  He was weaned to room air by the evening of 10/17/18.  He was discharged home on 10/18/18 with prescriptions for amlodipine, fluticasone-vilanterol, and 4 days of prednisone.  His albuterol inhaler and nebulizer solution were refilled.  
negative...

## 2018-10-12 NOTE — ASSESSMENT & PLAN NOTE
Current smoker presenting with wheezing and SOB. Hypoxic on exertion despite nebulizers. Transferred to the ICU for bipap overnight. Improving.  -Supplemental oxygen, wean as tolerated  -Duonebs q4h, hour long neb PRN  -Prednisone 40 mg daily  -Azithromycin   -Encourage smoking cessation  -Mucinex and sputum culture

## 2018-10-12 NOTE — PLAN OF CARE
Pt reports he lives with his niece and is independent with all adls. Pt stated his niece can provide assistance and transportation when d/c but he can not recall her # at this time.Pt informed Bert Costello of his friend Noel who she could get # from and SW informed Tn she will attempt to get #.   Pt informed of [facility] and [facility] and agrees to be scheduled. Tn sent email to [facility] and appt scheduled and placed on AVS.  TN gave pt brochure and card and encouraged to call for any needs/concerns.  Future Appointments   Date Time Provider Department Center   10/22/2018 10:00 AM Shakila Millan MD [facility]          10/12/18 3716   Discharge Assessment   Assessment Type Discharge Planning Assessment   Confirmed/corrected address and phone number on facesheet? Yes   Assessment information obtained from? Patient   Expected Length of Stay (days) 2   Communicated expected length of stay with patient/caregiver yes   Prior to hospitilization cognitive status: Alert/Oriented   Prior to hospitalization functional status: Independent   Current cognitive status: Alert/Oriented   Current Functional Status: Needs Assistance   Lives With other relative(s)  (niece)   Able to Return to Prior Arrangements yes   Is patient able to care for self after discharge? Yes   Who are your caregiver(s) and their phone number(s)? Angela bautista (niece) pt can not recall #   Patient's perception of discharge disposition home or selfcare   Readmission Within The Last 30 Days no previous admission in last 30 days   Patient currently being followed by outpatient case management? No   Patient currently receives any other outside agency services? No   Equipment Currently Used at Home none   Do you have any problems affording any of your prescribed medications? No   Is the patient taking medications as prescribed? yes   Does the patient have transportation home? Yes   Transportation Available family or friend will provide    Does the patient receive services at the [facility]? No   Discharge Plan A Home with family   Discharge Plan B Home with family;Home Health   Patient/Family In Agreement With Plan yes

## 2018-10-12 NOTE — ED NOTES
Pt removed oxygen again and was told to not remove oxygen. Pt breathing 30 respirations. Nasal cannula placed back on patient and breathing treatment ordered. Audible wheezing noted.

## 2018-10-12 NOTE — PROGRESS NOTES
SBP 150s-170s, DBP 90s-110s. HR 110s-120s. I spoke with SANJANA Smith. Order will be written for Labetolol IVP.

## 2018-10-12 NOTE — PLAN OF CARE
Spoke with Dr. Mann regarding pts transfer to ICU. Pt was 97% on Bipap, was SOB and seemed anxious. Pt was not wearing his mask properly, was taking it off and on to give himself his own inhaler that's at the bedside, and he was trying to eat. Pt was educated on the importance of wearing his mask so he can get the full treatment that he needs. He was also instructed that he needed to rest and stop moving around to decrease his oxygen consumption. Dr. Mann was updated on pts non-compliance and asked if pt can have something for anxiety. Plan is to give pt lorazepam, keep mask on and reassess for transfer.

## 2018-10-12 NOTE — PROGRESS NOTES
[facility] Hospital Medicine  Progress Note    Patient Name: Abhijit Marie  MRN: [id number]  Patient Class: IP- Inpatient   Admission Date: 10/11/2018  Length of Stay: 0 days  Attending Physician: Rogelio Grant MD  Primary Care Provider: Primary Doctor No        Subjective:     Principal Problem:Chronic obstructive pulmonary disease with acute exacerbation    HPI:  Mr. Marie is a 61 yo man with HTN and COPD who presents today with COPD exacerbation. History is limited d/t pt's respiratory distress. He reports that his symptoms began Monday with SOB and he took off work for three days. Today however he went in to work and overexerted himself. He presented to the ED, where he was in respiratory distress. O2 sats were in the 80s with exertion, and HR and BP were elevated. Troponin was slightly elevated at 0.032. All other labs were WNL. CXR showed changes of COPD. He was given 2 nebulizer treatments, steroid IV, and fioricet for headache. His room air sats did not improve despite treatment. He was admitted to hospital medicine.    Hospital Course:  Pt continued to have labored work of breathing and was placed on bipap and transferred to the ICU. Received another hour long neb with improvement.     Interval History: Pt in bed, tripod position, on bipap. Breath sounds with loud wheezing, improved work of breathing. Will try to wean bipap. Pt feels comfortable and is talking with us.    Review of Systems   Respiratory: Positive for chest tightness, shortness of breath and wheezing.      Objective:     Vital Signs (Most Recent):  Temp: 96 °F (35.6 °C) (10/12/18 1145)  Pulse: 98 (10/12/18 1230)  Resp: (!) 24 (10/12/18 1230)  BP: (!) 162/104 (10/12/18 1230)  SpO2: 95 % (10/12/18 1230) Vital Signs (24h Range):  Temp:  [96 °F (35.6 °C)-98.5 °F (36.9 °C)] 96 °F (35.6 °C)  Pulse:  [] 98  Resp:  [16-43] 24  SpO2:  [89 %-99 %] 95 %  BP: (133-218)/() 162/104     Weight: 80.6 kg (177 lb 11.1  oz)  Body mass index is 22.81 kg/m².    Intake/Output Summary (Last 24 hours) at 10/12/2018 1252  Last data filed at 10/12/2018 1130  Gross per 24 hour   Intake 240 ml   Output 400 ml   Net -160 ml      Physical Exam   Cardiovascular: Regular rhythm. Tachycardia present.   Pulmonary/Chest: Accessory muscle usage present. No respiratory distress. He has wheezes.   Louder chest today, improved   Musculoskeletal: He exhibits no edema.   Neurological: He is alert.       Significant Labs:   ABGs:   Recent Labs   Lab  10/11/18   2154   PH  7.322*   PCO2  47.5*   HCO3  24.6   POCSATURATED  80*   BE  -1     All pertinent labs within the past 24 hours have been reviewed.    Significant Imaging: I have reviewed all pertinent imaging results/findings within the past 24 hours.   Imaging Results          X-Ray Chest AP Portable (Final result)  Result time 10/11/18 13:44:16    Final result by Slim Rothman MD (10/11/18 13:44:16)                 Impression:      Chronic findings suggestive of COPD without detrimental change or radiographic acute intrathoracic process seen.  No focal consolidation.      Electronically signed by: Slim Rothman MD  Date:    10/11/2018  Time:    13:44             Narrative:    EXAMINATION:  XR CHEST AP PORTABLE    CLINICAL HISTORY:  CHF;    TECHNIQUE:  Frontal view of the chest was performed.    COMPARISON:  Chest radiograph 08/12/2018    FINDINGS:  Monitoring leads overlie the chest.  The lungs are symmetrically hyperinflated with bilateral diffuse nonspecific interstitial coarsening similar to prior.  No large consolidation, pleural effusion or pneumothorax.  Heart is not enlarged.  Calcific atherosclerosis with grossly stable mediastinal contours and hilar regions.  No acute osseous process seen.                                  Assessment/Plan:      * Chronic obstructive pulmonary disease with acute exacerbation    Current smoker presenting with wheezing and SOB. Hypoxic on exertion despite  nebulizers. Transferred to the ICU for bipap overnight. Improving.  -Supplemental oxygen, wean as tolerated  -Duonebs q4h, hour long neb PRN  -Prednisone 40 mg daily  -Azithromycin   -Encourage smoking cessation  -Mucinex and sputum culture        Essential hypertension    -218  -Continue home HCTZ   -Hydralazine and labetalol PRN  -Monitor        Tobacco abuse    -Nicotine patch  -Encourage cessation          VTE Risk Mitigation (From admission, onward)        Ordered     enoxaparin injection 40 mg  Daily      10/11/18 2111     IP VTE LOW RISK PATIENT  Once      10/11/18 2111            Shanel Donato PA-C  Department of Hospital Medicine   [facility]

## 2018-10-12 NOTE — CONSULTS
"  [facility]  Adult Nutrition  Consult Note    SUMMARY     Recommendations  Recommendation/Intervention:   1. Recommend Cardiac diet. Please Re-consult if nutrition support recs needs.    2. RD to follow.     Goals: Pt able to obtain nutrition by RD f/u  Nutrition Goal Status: new  Communication of RD Recs: reviewed with RN (POC)    Reason for Assessment  Reason for Assessment: consult  Diagnosis: pulmonary disease(COPD)  Relevant Medical History: COPD, HTN   Interdisciplinary Rounds: did not attend    General Information Comments: Pt in the middle of breathing treatment with respiratory therapist at time of visit. Respiratory therapist states that pt is currently unable to eat while on treatment. Pt still remains on regular diet. Spoke with nurse who reports that pt was eating on his own with no swallowing difficulties before admission, but relies on continous bipap, which is what triggered consult to RD. Wt record shows a 13 lb decrease in the past 2 months.  Will f/u with pt about wt changes and appetite at f/u.    Nutrition Discharge Planning: d/c on cardiac diet     Nutrition Risk Screen  Nutrition Risk Screen: no indicators present    Nutrition/Diet History  Do you have any cultural, spiritual, Restorationist conflicts, given your current situation?: Lutheran  Factors Affecting Nutritional Intake: (continous Bipap)    Anthropometrics  Temp: 97.1 °F (36.2 °C)  Height Method: Stated  Height: 6' 2" (188 cm)  Height (inches): 74 in  Weight Method: Bed Scale  Weight: 80.6 kg (177 lb 11.1 oz)  Weight (lb): 177.69 lb  Ideal Body Weight (IBW), Male: 190 lb  % Ideal Body Weight, Male (lb): 93.52 lb  BMI (Calculated): 22.9  BMI Grade: 18.5-24.9 - normal     Lab/Procedures/Meds  Pertinent Labs Reviewed: reviewed  Pertinent Labs Comments: methylprednisone sodium succinate  Pertinent Medications Reviewed: reviewed  Pertinent Medications Comments: enoxaparin    Physical Findings/Assessment  Overall Physical " Appearance: other (see comments)(Continous Bipap)  Oral/Mouth Cavity: tooth/teeth missing  Skin: intact    Estimated/Assessed Needs  Weight Used For Calorie Calculations: 80.6 kg (177 lb 11.1 oz)  Energy Calorie Requirements (kcal): 2531-0770 kcal/d  Energy Need Method: Kcal/kg(25-30 kcal/d)  Protein Requirements: 65-81 g/d(.8-1.0 g/kg)  Weight Used For Protein Calculations: 80.6 kg (177 lb 11.1 oz)  Fluid Requirements (mL): 1 ml/kcal or per MD  Fluid Need Method: RDA Method  RDA Method (mL): 2015    Nutrition Prescription Ordered  Current Diet Order: regular     Evaluation of Received Nutrient/Fluid Intake    Intake/Output Summary (Last 24 hours) at 10/12/2018 1136  Last data filed at 10/12/2018 1000  Gross per 24 hour   Intake 180 ml   Output 50 ml   Net 130 ml     Comments: LBM 10/11  % Intake of Estimated Energy Needs: JUNG  % Meal Intake: JUNG    Nutrition Risk  Level of Risk/Frequency of Follow-up: (f/u 2x/wk )     Assessment and Plan  Nutrition Problem  increased nutrient (energy) needs    Related to (etiology):   COPD    Signs and Symptoms (as evidenced by):   Pt need for continuous Bipap     Interventions/Recommendations (treatment strategy):  See recs     Nutrition Diagnosis Status:   New    Monitor and Evaluation  Food and Nutrient Intake: energy intake, food and beverage intake  Food and Nutrient Adminstration: diet order  Knowledge/Beliefs/Attitudes: food and nutrition knowledge/skill  Physical Activity and Function: nutrition-related ADLs and IADLs  Anthropometric Measurements: weight change, weight  Biochemical Data, Medical Tests and Procedures: electrolyte and renal panel, gastrointestinal profile, glucose/endocrine profile, inflammatory profile, lipid profile  Nutrition-Focused Physical Findings: overall appearance     Nutrition Follow-Up  RD Follow-up?: Yes

## 2018-10-12 NOTE — SUBJECTIVE & OBJECTIVE
Interval History: Pt in bed, tripod position, on bipap. Breath sounds with loud wheezing, improved work of breathing. Will try to wean bipap. Pt feels comfortable and is talking with us.    Review of Systems   Respiratory: Positive for chest tightness, shortness of breath and wheezing.      Objective:     Vital Signs (Most Recent):  Temp: 96 °F (35.6 °C) (10/12/18 1145)  Pulse: 98 (10/12/18 1230)  Resp: (!) 24 (10/12/18 1230)  BP: (!) 162/104 (10/12/18 1230)  SpO2: 95 % (10/12/18 1230) Vital Signs (24h Range):  Temp:  [96 °F (35.6 °C)-98.5 °F (36.9 °C)] 96 °F (35.6 °C)  Pulse:  [] 98  Resp:  [16-43] 24  SpO2:  [89 %-99 %] 95 %  BP: (133-218)/() 162/104     Weight: 80.6 kg (177 lb 11.1 oz)  Body mass index is 22.81 kg/m².    Intake/Output Summary (Last 24 hours) at 10/12/2018 1252  Last data filed at 10/12/2018 1130  Gross per 24 hour   Intake 240 ml   Output 400 ml   Net -160 ml      Physical Exam   Cardiovascular: Regular rhythm. Tachycardia present.   Pulmonary/Chest: Accessory muscle usage present. No respiratory distress. He has wheezes.   Louder chest today, improved   Musculoskeletal: He exhibits no edema.   Neurological: He is alert.       Significant Labs:   ABGs:   Recent Labs   Lab  10/11/18   2154   PH  7.322*   PCO2  47.5*   HCO3  24.6   POCSATURATED  80*   BE  -1     All pertinent labs within the past 24 hours have been reviewed.    Significant Imaging: I have reviewed all pertinent imaging results/findings within the past 24 hours.   Imaging Results          X-Ray Chest AP Portable (Final result)  Result time 10/11/18 13:44:16    Final result by Slim Rothman MD (10/11/18 13:44:16)                 Impression:      Chronic findings suggestive of COPD without detrimental change or radiographic acute intrathoracic process seen.  No focal consolidation.      Electronically signed by: Slim Rothman MD  Date:    10/11/2018  Time:    13:44             Narrative:    EXAMINATION:  XR CHEST AP  PORTABLE    CLINICAL HISTORY:  CHF;    TECHNIQUE:  Frontal view of the chest was performed.    COMPARISON:  Chest radiograph 08/12/2018    FINDINGS:  Monitoring leads overlie the chest.  The lungs are symmetrically hyperinflated with bilateral diffuse nonspecific interstitial coarsening similar to prior.  No large consolidation, pleural effusion or pneumothorax.  Heart is not enlarged.  Calcific atherosclerosis with grossly stable mediastinal contours and hilar regions.  No acute osseous process seen.

## 2018-10-13 NOTE — ASSESSMENT & PLAN NOTE
Current smoker presenting with wheezing and SOB. Hypoxic on exertion despite nebulizers. Transferred to the ICU for bipap overnight. Improving, on high flow NC  -Supplemental oxygen, wean as tolerated  -Duonebs q4h, hour long neb PRN  -Prednisone 40 mg daily  -Azithromycin   -Encourage smoking cessation  -Mucinex and sputum culture: negative

## 2018-10-13 NOTE — PROGRESS NOTES
Patient remain on NC, he was informed on the need for BiPap this evening, secondary to VS and overall health, verbalized understanding.

## 2018-10-13 NOTE — PLAN OF CARE
Problem: Patient Care Overview  Goal: Plan of Care Review  Outcome: Ongoing (interventions implemented as appropriate)  Alert and oriented. Remains tachycardic and tachypneic. BiPap PRN; maintained on 3L NC when not on BiPap. Displays dyspnea on exertion; deep breathing and tripod positioning facilitated. Urine output adequate. Repositioned frequently with minimal assistance. Safety maintained with side rails x3, room near nurse station and active bed alarm. Plan of care reviewed with patient.

## 2018-10-13 NOTE — SUBJECTIVE & OBJECTIVE
Interval History: Sounding more open again today. Pt feels nervous. Discussed need for smoking cessation.     Review of Systems   Respiratory: Positive for cough, chest tightness and shortness of breath.    Cardiovascular: Negative for chest pain.   Gastrointestinal: Negative for abdominal pain.     Objective:     Vital Signs (Most Recent):  Temp: 98.5 °F (36.9 °C) (10/13/18 1115)  Pulse: (!) 114 (10/13/18 1200)  Resp: (!) 21 (10/13/18 1200)  BP: (!) 148/79 (10/13/18 1200)  SpO2: (!) 90 % (10/13/18 1200) Vital Signs (24h Range):  Temp:  [97.2 °F (36.2 °C)-98.5 °F (36.9 °C)] 98.5 °F (36.9 °C)  Pulse:  [] 114  Resp:  [17-37] 21  SpO2:  [87 %-99 %] 90 %  BP: (129-189)/() 148/79     Weight: 80.6 kg (177 lb 11.1 oz)  Body mass index is 22.81 kg/m².    Intake/Output Summary (Last 24 hours) at 10/13/2018 1341  Last data filed at 10/13/2018 1100  Gross per 24 hour   Intake 240 ml   Output 1825 ml   Net -1585 ml      Physical Exam   Pulmonary/Chest: Effort normal. He has wheezes.   Neurological: He is alert.   Skin: Skin is warm and dry.       Significant Labs: All pertinent labs within the past 24 hours have been reviewed.    Significant Imaging: I have reviewed all pertinent imaging results/findings within the past 24 hours.

## 2018-10-13 NOTE — PROGRESS NOTES
Patient informed on his pm resp treatments, noted he is wheezing on inspiration/expirations. He is supported with sitting High Tran and remaining comfortable.

## 2018-10-13 NOTE — PROGRESS NOTES
[facility] Hospital Medicine  Progress Note    Patient Name: Abhijit Marie  MRN: [id number]  Patient Class: IP- Inpatient   Admission Date: 10/11/2018  Length of Stay: 1 days  Attending Physician: Rogelio Grant MD  Primary Care Provider: Primary Doctor No        Subjective:     Principal Problem:Chronic obstructive pulmonary disease with acute exacerbation    HPI:  Mr. Marie is a 61 yo man with HTN and COPD who presents today with COPD exacerbation. History is limited d/t pt's respiratory distress. He reports that his symptoms began Monday with SOB and he took off work for three days. Today however he went in to work and overexerted himself. He presented to the ED, where he was in respiratory distress. O2 sats were in the 80s with exertion, and HR and BP were elevated. Troponin was slightly elevated at 0.032. All other labs were WNL. CXR showed changes of COPD. He was given 2 nebulizer treatments, steroid IV, and fioricet for headache. His room air sats did not improve despite treatment. He was admitted to hospital medicine.    Hospital Course:  Pt continued to have labored work of breathing and was placed on bipap and transferred to the ICU. Received another hour long neb with improvement. Downgraded to high flow NC. Respiratory culture shows normal respiratory lyubov.     Interval History: Sounding more open again today. Pt feels nervous. Discussed need for smoking cessation.     Review of Systems   Respiratory: Positive for cough, chest tightness and shortness of breath.    Cardiovascular: Negative for chest pain.   Gastrointestinal: Negative for abdominal pain.     Objective:     Vital Signs (Most Recent):  Temp: 98.5 °F (36.9 °C) (10/13/18 1115)  Pulse: (!) 114 (10/13/18 1200)  Resp: (!) 21 (10/13/18 1200)  BP: (!) 148/79 (10/13/18 1200)  SpO2: (!) 90 % (10/13/18 1200) Vital Signs (24h Range):  Temp:  [97.2 °F (36.2 °C)-98.5 °F (36.9 °C)] 98.5 °F (36.9 °C)  Pulse:  []  114  Resp:  [17-37] 21  SpO2:  [87 %-99 %] 90 %  BP: (129-189)/() 148/79     Weight: 80.6 kg (177 lb 11.1 oz)  Body mass index is 22.81 kg/m².    Intake/Output Summary (Last 24 hours) at 10/13/2018 1341  Last data filed at 10/13/2018 1100  Gross per 24 hour   Intake 240 ml   Output 1825 ml   Net -1585 ml      Physical Exam   Pulmonary/Chest: Effort normal. He has wheezes.   Neurological: He is alert.   Skin: Skin is warm and dry.       Significant Labs: All pertinent labs within the past 24 hours have been reviewed.    Significant Imaging: I have reviewed all pertinent imaging results/findings within the past 24 hours.    Assessment/Plan:      * Chronic obstructive pulmonary disease with acute exacerbation    Current smoker presenting with wheezing and SOB. Hypoxic on exertion despite nebulizers. Transferred to the ICU for bipap overnight. Improving, on high flow NC  -Supplemental oxygen, wean as tolerated  -Duonebs q4h, hour long neb PRN  -Prednisone 40 mg daily  -Azithromycin   -Encourage smoking cessation  -Mucinex and sputum culture: negative        Essential hypertension    -189  -Continue home HCTZ   -Hydralazine and labetalol PRN  -Monitor        Tobacco abuse    -Nicotine patch  -Encourage cessation - seems motivated          VTE Risk Mitigation (From admission, onward)        Ordered     enoxaparin injection 40 mg  Daily      10/11/18 2111     IP VTE LOW RISK PATIENT  Once      10/11/18 2111          Shanel Donato PA-C  Department of Hospital Medicine   [facility]

## 2018-10-13 NOTE — PROGRESS NOTES
Received bedside report from off going nurse, patient is sitting in a High-Tran's position with over bed table close by. He appears to be excited and heighten on the evening activities of shift change. Patient currently have in place his   Nasal cannula humidified O2 in place. POC discussed with patient, informed him too reserve his energy, secondary too him using his accessory muscles too talk. All VS are elevated, HR is elevated along with resp status. Patient verbalized all assessment, per flow record.

## 2018-10-13 NOTE — PROGRESS NOTES
Patient states, he want too eat something, he asked for his dinner tray that was resting at the Nurses Station. Patient's food heated and given to him. Noted he ate 55% of his meal. Will allow food to digest and then place on BiPap through out the night.

## 2018-10-13 NOTE — PLAN OF CARE
Problem: Patient Care Overview  Goal: Plan of Care Review  Outcome: Revised  Mr. Marie was educated on his risk for falling. He verbalizes understanding. Bed alarm on. Call bell in reach. Hydralazine and Labetalol IVP given for SBP >180 today. He was able to be weaned off of Bipap and 02 sats >88% on 3L 02 per high flow nasal cannula. He does get anxious at times, but he is cooperative with slow deep breathing techniques which improves his BP, HR, and 02 sats.

## 2018-10-13 NOTE — PROGRESS NOTES
Patient request to have BiPap removed, secondary to him being tired. BiPAP removed and NC humidified placed.

## 2018-10-13 NOTE — PROGRESS NOTES
Patient removed the BiPap  And starting desat down too the 80's Mask reapplies and he was instructed too keep it in placed. He was compliant.

## 2018-10-14 NOTE — PLAN OF CARE
Problem: Patient Care Overview  Goal: Plan of Care Review  Pt. Appears SOB @ this time.  Will continue to monitor pt.

## 2018-10-14 NOTE — EICU
eICU Note :    Called by the [facility]:    Problem: 's despite Hydralazine and Labetalol  In the setting of COPD exacerbation on BIPAP    Pertinent History and labs reviewed :  Patient Active Problem List    Diagnosis Date Noted    COPD exacerbation 10/11/2018    Acute respiratory failure with hypoxia 06/25/2018    Chronic obstructive pulmonary disease with acute exacerbation 12/13/2015    Essential hypertension 12/13/2015    Reactive airway disease 12/02/2014    Tobacco abuse 12/02/2014         Treatment /Intervention given: Rozerem 8 mg x1 po, discussed with bedside      Pam Monson M.D  eICU Physician  8:05 PM  BP still 180 . Increased Hydralazine to 10 q 6 PRN

## 2018-10-14 NOTE — PLAN OF CARE
Problem: Patient Care Overview  Goal: Plan of Care Review  Outcome: Ongoing (interventions implemented as appropriate)   10/14/18 0603   Coping/Psychosocial   Plan Of Care Reviewed With patient   Patient resting in bed comfortably on bipap. Required bipap most of the shift. Anxiety relieved moderately by ramelteon. Slept in between care during shift. Hypertension relieved by PRN hydralazine. Continuous monitoring maintained. No family at bedside.

## 2018-10-14 NOTE — SUBJECTIVE & OBJECTIVE
Interval History: sounds marginally improved today. Appears more comfortable. Reports he recently moved into a new house with a lot of dust. Feels he has a lot of junk in his chest.     Review of Systems   HENT: Positive for congestion.    Respiratory: Positive for cough, chest tightness and shortness of breath.    Cardiovascular: Negative for chest pain.     Objective:     Vital Signs (Most Recent):  Temp: 97.1 °F (36.2 °C) (10/14/18 0705)  Pulse: 99 (10/14/18 1000)  Resp: (!) 22 (10/14/18 1000)  BP: (!) 162/103 (10/14/18 1000)  SpO2: (!) 93 % (10/14/18 1000) Vital Signs (24h Range):  Temp:  [97.1 °F (36.2 °C)-98.5 °F (36.9 °C)] 97.1 °F (36.2 °C)  Pulse:  [] 99  Resp:  [15-36] 22  SpO2:  [87 %-98 %] 93 %  BP: (125-196)/() 162/103     Weight: 79.5 kg (175 lb 4.3 oz)  Body mass index is 22.5 kg/m².    Intake/Output Summary (Last 24 hours) at 10/14/2018 1035  Last data filed at 10/14/2018 0901  Gross per 24 hour   Intake 590 ml   Output 1775 ml   Net -1185 ml      Physical Exam   Cardiovascular: Normal rate and regular rhythm.   Pulmonary/Chest: Accessory muscle usage present. He has wheezes.   Neurological: He is alert.   Skin: Skin is warm and dry.       Significant Labs: All pertinent labs within the past 24 hours have been reviewed.    Significant Imaging: I have reviewed all pertinent imaging results/findings within the past 24 hours.

## 2018-10-14 NOTE — PROGRESS NOTES
[facility] Hospital Medicine  Progress Note    Patient Name: Abhijit Marie  MRN: [id number]  Patient Class: IP- Inpatient   Admission Date: 10/11/2018  Length of Stay: 2 days  Attending Physician: Rogelio Grant MD  Primary Care Provider: Primary Doctor No        Subjective:     Principal Problem:Chronic obstructive pulmonary disease with acute exacerbation    HPI:  Mr. Marie is a 63 yo man with HTN and COPD who presents today with COPD exacerbation. History is limited d/t pt's respiratory distress. He reports that his symptoms began Monday with SOB and he took off work for three days. Today however he went in to work and overexerted himself. He presented to the ED, where he was in respiratory distress. O2 sats were in the 80s with exertion, and HR and BP were elevated. Troponin was slightly elevated at 0.032. All other labs were WNL. CXR showed changes of COPD. He was given 2 nebulizer treatments, steroid IV, and fioricet for headache. His room air sats did not improve despite treatment. He was admitted to hospital medicine.    Hospital Course:  Pt continued to have labored work of breathing and was placed on bipap and transferred to the ICU. Received another hour long neb with improvement. Downgraded to high flow NC. Respiratory culture shows normal respiratory lyubov. Checking flu swab. Added zyrtec. Required intermittent bipap.    Interval History: sounds marginally improved today. Appears more comfortable. Reports he recently moved into a new house with a lot of dust. Feels he has a lot of junk in his chest.     Review of Systems   HENT: Positive for congestion.    Respiratory: Positive for cough, chest tightness and shortness of breath.    Cardiovascular: Negative for chest pain.     Objective:     Vital Signs (Most Recent):  Temp: 97.1 °F (36.2 °C) (10/14/18 0705)  Pulse: 99 (10/14/18 1000)  Resp: (!) 22 (10/14/18 1000)  BP: (!) 162/103 (10/14/18 1000)  SpO2: (!) 93 % (10/14/18  1000) Vital Signs (24h Range):  Temp:  [97.1 °F (36.2 °C)-98.5 °F (36.9 °C)] 97.1 °F (36.2 °C)  Pulse:  [] 99  Resp:  [15-36] 22  SpO2:  [87 %-98 %] 93 %  BP: (125-196)/() 162/103     Weight: 79.5 kg (175 lb 4.3 oz)  Body mass index is 22.5 kg/m².    Intake/Output Summary (Last 24 hours) at 10/14/2018 1035  Last data filed at 10/14/2018 0901  Gross per 24 hour   Intake 590 ml   Output 1775 ml   Net -1185 ml      Physical Exam   Cardiovascular: Normal rate and regular rhythm.   Pulmonary/Chest: Accessory muscle usage present. He has wheezes.   Neurological: He is alert.   Skin: Skin is warm and dry.       Significant Labs: All pertinent labs within the past 24 hours have been reviewed.    Significant Imaging: I have reviewed all pertinent imaging results/findings within the past 24 hours.    Assessment/Plan:      * Chronic obstructive pulmonary disease with acute exacerbation    Current smoker presenting with wheezing and SOB. Hypoxic on exertion despite nebulizers. Transferred to the ICU for bipap overnight. Improving, on high flow NC  -Supplemental oxygen, wean as tolerated  -Duonebs q4h, hour long neb PRN  -Prednisone 40 mg daily  -Azithromycin   -Zyrtec, mucinex, a capella  -Check flu swab  -Encourage smoking cessation  -Sputum culture 1: negative; checking again        Essential hypertension    -196  -Continue home HCTZ   -Hydralazine and labetalol PRN  -Monitor  -Work on reducing anxiety        Tobacco abuse    -Nicotine patch  -Encourage cessation - seems motivated          VTE Risk Mitigation (From admission, onward)        Ordered     enoxaparin injection 40 mg  Daily      10/11/18 2111     IP VTE LOW RISK PATIENT  Once      10/11/18 2111              Shanel Donato PA-C  Department of Hospital Medicine   [facility]

## 2018-10-14 NOTE — ASSESSMENT & PLAN NOTE
Current smoker presenting with wheezing and SOB. Hypoxic on exertion despite nebulizers. Transferred to the ICU for bipap overnight. Improving, on high flow NC  -Supplemental oxygen, wean as tolerated  -Duonebs q4h, hour long neb PRN  -Prednisone 40 mg daily  -Azithromycin   -Zyrtec, mucinex, a capella  -Check flu swab  -Encourage smoking cessation  -Sputum culture 1: negative; checking again

## 2018-10-14 NOTE — PROGRESS NOTES
"Pt. C/o Bipap pressure being "too high".  RT changed pressure to +10/+5 per pt. Comfort.  Pt. States "that feels better."  "

## 2018-10-14 NOTE — NURSING
Called eICU to report pt hypertension post labetolol and hydralazine. Pt sitting up in bed with mild anxiety on bipap. MD in patient's room at this time, RN to relay message as soon as available.

## 2018-10-14 NOTE — NURSING
Dr Boswell called unit back- no additional antihypertensives now because of recent administration. Pt mildly anxious, but calming down now that he is back on bipap- avoid benzodiazepines because of respiratory status. Give ramelteon for sleep to see if that helps patient's mild anxiety and decreases blood pressure.

## 2018-10-14 NOTE — EICU
Bedside nurse would like pt 's prn meds changed frequency due to pt b/p continues to be hgih after labetalol and hydralazine.  Informed Dr. Boswell.

## 2018-10-15 NOTE — PROGRESS NOTES
.Pharmacy New Medication Education    Patient accepted medication education.    Pharmacy educated patient on name and purpose of medications and possible side effects, using the teach-back method.     Current Inpatient Medication Orders   acetaminophen tablet 650 mg   albuterol sulfate nebulizer solution 2.5 mg   albuterol-ipratropium 2.5 mg-0.5 mg/3 mL nebulizer solution 3 mL   amLODIPine tablet 10 mg   azithromycin tablet 250 mg   cetirizine tablet 10 mg   enoxaparin injection 40 mg   fluticasone-vilanterol 200-25 mcg/dose diskus inhaler 1 puff   guaiFENesin 12 hr tablet 1,200 mg   hydrALAZINE injection 10 mg   hydroCHLOROthiazide tablet 25 mg   HYDROcodone-acetaminophen 5-325 mg per tablet 1 tablet   influenza (FLUZONE QUADRIVALENT) vaccine 0.5 mL   methylPREDNISolone sodium succinate injection 40 mg   nicotine 21 mg/24 hr 1 patch   ramelteon tablet 8 mg   senna-docusate 8.6-50 mg per tablet 1 tablet   sodium chloride 0.9% flush 3 mL   sodium chloride 3% nebulizer solution 4 mL       Learners of pharmacy medication education included:  Patient    Patient +/- learner response:  Verbalized Understanding, Teachback

## 2018-10-15 NOTE — PLAN OF CARE
Problem: Patient Care Overview  Goal: Plan of Care Review  Outcome: Ongoing (interventions implemented as appropriate)  Received patient from ICU around  2015H, transported via bed, Patient conscious coherent to time , date, place, person and situation. Calm cooperative. With no subjective complaint of pain any pain.Patient ambulated from bed to bed, with shortness of breath upon ambulation, tachypneic with expiratory wheezes all over his chest. With saline lock on the right AC gauge 20 and left hand gauge 22, flushed, patent with clean and dry dressing. Nicotine patch intact on the left arm.  With oxygen at 5 lpm via NC saturation of 94%. RT informed regarding this transfer. Dr Mann informed regarding this transfer. Patient has no Bowel movement for three days. Senna PO given.  Plan of care reviewed with the patient noted understanding. Advised patient to call the nurse for any assistance, and to report any chest pain, difficulty of breathing or any discomfort. Safety fall precautions noted at all times. Call bell within reach. Telemetry On, sinus tachycardic 105-115's. Will continue to monitor patient.

## 2018-10-15 NOTE — PLAN OF CARE
Problem: Patient Care Overview  Goal: Plan of Care Review  Outcome: Ongoing (interventions implemented as appropriate)  The proper method of use, as well as anticipated side effects, of this aerosol treatment are discussed and demonstrated to the patient.   The proper method of use, as well as anticipated side effects, of this metered-dose inhaler are discussed and demonstrated to the patient.  Patient on oxygen with documented flow.  Will attempt to wean per O2 order protocol.  Will continue to monitor.

## 2018-10-15 NOTE — ASSESSMENT & PLAN NOTE
Current smoker presenting with wheezing and SOB. Hypoxic on exertion despite nebulizers. Transferred to the ICU for bipap overnight. Improving, on high flow NC  -Supplemental oxygen, wean as tolerated  -Duonebs q4h, hour long neb PRN  -IV methylprednisolone   -Azithromycin   -Zyrtec, mucinex  -Check flu swab  -Encourage smoking cessation  -Sputum culture x2: negative

## 2018-10-15 NOTE — PROGRESS NOTES
[facility] Hospital Medicine  Progress Note    Patient Name: Abhijit Marie  MRN: [id number]  Patient Class: IP- Inpatient   Admission Date: 10/11/2018  Length of Stay: 3 days  Attending Physician: Rogelio Grant MD  Primary Care Provider: Primary Doctor No        Subjective:     Principal Problem:Chronic obstructive pulmonary disease with acute exacerbation    HPI:  Mr. Marie is a 61 yo man with HTN and COPD who presents today with COPD exacerbation. History is limited d/t pt's respiratory distress. He reports that his symptoms began Monday with SOB and he took off work for three days. Today however he went in to work and overexerted himself. He presented to the ED, where he was in respiratory distress. O2 sats were in the 80s with exertion, and HR and BP were elevated. Troponin was slightly elevated at 0.032. All other labs were WNL. CXR showed changes of COPD. He was given 2 nebulizer treatments, steroid IV, and fioricet for headache. His room air sats did not improve despite treatment. He was admitted to hospital medicine.    Hospital Course:  Pt continued to have labored work of breathing and was placed on bipap and transferred to the ICU. Received another hour long neb with improvement. Downgraded to high flow NC. Respiratory culture shows normal respiratory lyubov x2. Flu swab negative. Added zyrtec. Required intermittent bipap on first two days of admission. Stepped down to the floor on 10/15.     Interval History: Improved today. Still with cough and mucous production. Lungs sound much clearer. Anticipate dc tomorrow or next day.    Review of Systems   Respiratory: Positive for cough, shortness of breath and wheezing.    Cardiovascular: Negative for chest pain.   Gastrointestinal: Negative for abdominal pain.     Objective:     Vital Signs (Most Recent):  Temp: 98 °F (36.7 °C) (10/15/18 0758)  Pulse: 88 (10/15/18 0800)  Resp: 20 (10/15/18 0758)  BP: (!) 149/89 (10/15/18  0758)  SpO2: (!) 92 % (10/15/18 0758) Vital Signs (24h Range):  Temp:  [97.2 °F (36.2 °C)-98.4 °F (36.9 °C)] 98 °F (36.7 °C)  Pulse:  [] 88  Resp:  [15-37] 20  SpO2:  [90 %-98 %] 92 %  BP: (123-161)/() 149/89     Weight: 79.4 kg (175 lb 0.7 oz)  Body mass index is 22.47 kg/m².    Intake/Output Summary (Last 24 hours) at 10/15/2018 1016  Last data filed at 10/15/2018 0500  Gross per 24 hour   Intake 775 ml   Output 1150 ml   Net -375 ml      Physical Exam   Cardiovascular: Normal rate and regular rhythm.   Pulmonary/Chest: Effort normal. He has wheezes.   Abdominal: Soft.   Musculoskeletal: He exhibits no edema.   Neurological: He is alert.       Significant Labs:   BMP:   Recent Labs   Lab  10/15/18   0504   GLU  144*   NA  139   K  4.2   CL  95   CO2  34*   BUN  34*   CREATININE  1.3   CALCIUM  10.5   MG  2.6     CBC:   Recent Labs   Lab  10/15/18   0504   WBC  19.73*   HGB  16.3   HCT  50.2   PLT  312       Significant Imaging: I have reviewed all pertinent imaging results/findings within the past 24 hours.    Assessment/Plan:      * Chronic obstructive pulmonary disease with acute exacerbation    Current smoker presenting with wheezing and SOB. Hypoxic on exertion despite nebulizers. Transferred to the ICU for bipap overnight. Improving, on high flow NC  -Supplemental oxygen, wean as tolerated  -Duonebs q4h, hour long neb PRN  -IV methylprednisolone   -Azithromycin   -Zyrtec, mucinex  -Check flu swab  -Encourage smoking cessation  -Sputum culture x2: negative        Essential hypertension    -161  -Continue home HCTZ   -Hydralazine and labetalol PRN  -Monitor  -Work on reducing anxiety        Tobacco abuse    -Nicotine patch  -Encourage cessation: seems motivated          VTE Risk Mitigation (From admission, onward)        Ordered     enoxaparin injection 40 mg  Daily      10/11/18 2111     IP VTE LOW RISK PATIENT  Once      10/11/18 2111              Shanel Donato PA-C  Department of  Hospital Medicine [facility]

## 2018-10-15 NOTE — PROGRESS NOTES
Individual Follow-Up Form    10/15/2018    Quit Date:   To be determined    Clinical Status of Patient: Inpatient    Length of Service: 30 minutes    Continuing Medication: yes  Patches    Comments: Patient is currently admitted to the hospital with COPD exacerbation.  States that he is highly motivated to quit smoking at this time.  Smoking cessation education and materials provided, and pt enrolled in the Ambulatory Smoking Cessation program during this encounter    Diagnosis: F17.210    Next Visit: Ambulatory referral to Smoking Cessation program

## 2018-10-15 NOTE — SUBJECTIVE & OBJECTIVE
Interval History: Improved today. Still with cough and mucous production. Lungs sound much clearer. Anticipate dc tomorrow or next day.    Review of Systems   Respiratory: Positive for cough, shortness of breath and wheezing.    Cardiovascular: Negative for chest pain.   Gastrointestinal: Negative for abdominal pain.     Objective:     Vital Signs (Most Recent):  Temp: 98 °F (36.7 °C) (10/15/18 0758)  Pulse: 88 (10/15/18 0800)  Resp: 20 (10/15/18 0758)  BP: (!) 149/89 (10/15/18 0758)  SpO2: (!) 92 % (10/15/18 0758) Vital Signs (24h Range):  Temp:  [97.2 °F (36.2 °C)-98.4 °F (36.9 °C)] 98 °F (36.7 °C)  Pulse:  [] 88  Resp:  [15-37] 20  SpO2:  [90 %-98 %] 92 %  BP: (123-161)/() 149/89     Weight: 79.4 kg (175 lb 0.7 oz)  Body mass index is 22.47 kg/m².    Intake/Output Summary (Last 24 hours) at 10/15/2018 1016  Last data filed at 10/15/2018 0500  Gross per 24 hour   Intake 775 ml   Output 1150 ml   Net -375 ml      Physical Exam   Cardiovascular: Normal rate and regular rhythm.   Pulmonary/Chest: Effort normal. He has wheezes.   Abdominal: Soft.   Musculoskeletal: He exhibits no edema.   Neurological: He is alert.       Significant Labs:   BMP:   Recent Labs   Lab  10/15/18   0504   GLU  144*   NA  139   K  4.2   CL  95   CO2  34*   BUN  34*   CREATININE  1.3   CALCIUM  10.5   MG  2.6     CBC:   Recent Labs   Lab  10/15/18   0504   WBC  19.73*   HGB  16.3   HCT  50.2   PLT  312       Significant Imaging: I have reviewed all pertinent imaging results/findings within the past 24 hours.

## 2018-10-15 NOTE — NURSING TRANSFER
Nursing Transfer Note      10/14/2018     Transfer To: [id number]    Transfer via bed    Transfer with 5L NC to O2, cardiac monitoring    Transported by ICU RN    Medicines sent: inhaler x2, Lovenox.     Chart send with patient: Yes    Notified: christiano per patient    Report off at bedside to MARY Puga

## 2018-10-16 NOTE — PROGRESS NOTES
[facility]  Progress Note    Patient Name: Abhijit Marie  MRN: [id number]  Patient Class: IP- Inpatient   Admission Date: 10/11/2018  Length of Stay: 4 days  Attending Physician: Chai Mann MD  Primary Care Provider: Primary Doctor No        Subjective:     Principal Problem:Chronic obstructive pulmonary disease with acute exacerbation    HPI:  Abhijit Marie is a 62 y.o. black man with hypertension, cigarette smoking, and chronic obstructive pulmonary disease.  He lives in [place], [place].  He is .  He works for [employer].              He presented to [facility] Emergency Department on Thursday 10/11/18 with respiratory distress.  He started becoming short of breath on Monday 10/8/18 and took off work for 3 days, then returned to work and overexerted himself.  In the ED, oxygen saturations were in the 80s with exertion, and pulse and blood pressure were elevated.  Troponin was slightly elevated at 0.032.  All other labs were within normal limits.  Chest X-ray showed changes of COPD.  He was given 2 nebulizer treatments, IV methylprednisolone, and Fioricet for headache.  His room air oxygen saturations did not improve despite treatment.  He was admitted to [facility].    Hospital Course:  He continued to have labored work of breathing and was placed on BiPA and transferred to the ICU.  He received another hour long nebulizer treatment with improvement.  He was weaned to high flow nasal cannula.  Respiratory culture showed normal respiratory lyubov x 2.  Flu swab was negative.  Cetirizine was added.  He required intermittent BiPAP on the first two days of the hospitalization.  He was transferred out of the ICU on 10/15/18.  Smoking cessation was discussed again, as it had been during his last hospitalization for COPD exacerbation.    Interval History: See above.    Review of Systems   Constitutional: Negative for chills  and fever.   Gastrointestinal: Negative for nausea and vomiting.     Objective:     Vital Signs (Most Recent):  Temp: 97.6 °F (36.4 °C) (10/16/18 1217)  Pulse: 99 (10/16/18 1217)  Resp: 16 (10/16/18 1217)  BP: 135/86 (10/16/18 1217)  SpO2: (!) 92 % (10/16/18 1105) Vital Signs (24h Range):  Temp:  [97.1 °F (36.2 °C)-99.1 °F (37.3 °C)] 97.6 °F (36.4 °C)  Pulse:  [] 99  Resp:  [16-22] 16  SpO2:  [91 %-98 %] 92 %  BP: (118-148)/(79-96) 135/86     Weight: 80 kg (176 lb 5.9 oz)  Body mass index is 22.64 kg/m².    Intake/Output Summary (Last 24 hours) at 10/16/2018 1247  Last data filed at 10/16/2018 1045  Gross per 24 hour   Intake 1055 ml   Output 1351 ml   Net -296 ml      Physical Exam   Constitutional: He is oriented to person, place, and time. He appears well-developed. No distress.   Pulmonary/Chest: No respiratory distress. He has wheezes.   Neurological: He is alert and oriented to person, place, and time.   Psychiatric: He has a normal mood and affect.   Nursing note and vitals reviewed.      Significant Labs: All pertinent labs within the past 24 hours have been reviewed.    Significant Imaging: I have reviewed all pertinent imaging results/findings within the past 24 hours.   X-Ray Chest AP Portable 10/11/18:   FINDINGS:  Monitoring leads overlie the chest.  The lungs are symmetrically hyperinflated with bilateral diffuse nonspecific interstitial coarsening similar to prior.  No large consolidation, pleural effusion or pneumothorax.  Heart is not enlarged.  Calcific atherosclerosis with grossly stable mediastinal contours and hilar regions.  No acute osseous process seen.  Impression: Chronic findings suggestive of COPD without detrimental change or radiographic acute intrathoracic process seen.  No focal consolidation.    Assessment/Plan:      * Chronic obstructive pulmonary disease with acute exacerbation    Acute respiratory failure with hypoxia  Nebulizer treatments.  Fluticasone-vilanterol.   Methylprednisolone.  Wean O2.        Essential hypertension    Continue home hydrochlorothiazide.        Tobacco abuse    Nicotine patch.  Motivated to stop smoking.          VTE Risk Mitigation (From admission, onward)        Ordered     enoxaparin injection 40 mg  Daily      10/11/18 2111     IP VTE LOW RISK PATIENT  Once      10/11/18 2111              Chai Mann MD  Department of Hospital Medicine   [facility]

## 2018-10-16 NOTE — PLAN OF CARE
Problem: Patient Care Overview  Goal: Plan of Care Review  Outcome: Ongoing (interventions implemented as appropriate)  Received patient upon rounds 1905H, patient seen conscious, coherent to time , date, place, person and situation.  Calm, cooperative with no subjective complaint of any pain. Still with cough, expectorate of small whitish sputum, with expiratory wheezes all over his chest, on regular breathing treatments. Saline lock on the right  hand gauge 20, flushed, patent with clean and dry dressing. Nicotine patch intact on the right arm.  With oxygen at 3lpm via NC saturation of 94%-95%. Plan of care reviewed with the patient noted understanding. Advised patient to call the nurse for any assistance, and to report any chest pain, difficulty of breathing or any discomfort. Safety fall precautions noted at all times. Call bell within reach. Telemetry On, sinus tachycardic 105-110's.  Patient complaint of headache around 2100 due to coughing, PRN pain medication given. Ramelteon PO given as requested by the patient. Will continue to monitor patient.

## 2018-10-16 NOTE — PLAN OF CARE
Rounded on patient.  Patient AAOx3  On high flow oxygen   Patient does NOT wear home oxygen       10/16/18 1016   Discharge Reassessment   Assessment Type Discharge Planning Reassessment   Provided patient/caregiver education on the expected discharge date and the discharge plan Yes   Discharge Plan A Home;Home with family   Discharge Plan B Home;Home with family   Patient choice form signed by patient/caregiver No   Can the patient answer the patient profile reliably? Yes, cognitively intact   Describe the patient's ability to walk at the present time. No restrictions   How often would a person be available to care for the patient? Whenever needed   During the past month, has the patient often been bothered by feeling down, depressed or hopeless? No   During the past month, has the patient often been bothered by little interest or pleasure in doing things? No     Freida Gutierres RN, CCM, CMSRN  RN Transition Navigator  [phone]

## 2018-10-16 NOTE — PLAN OF CARE
Problem: Patient Care Overview (Adult)  Goal: Plan of Care Review  Outcome: Ongoing (interventions implemented as appropriate)  Recommendation/Intervention:   1. Continue to encourage intake at meals    Goals:   Pt will consume at least 50-75% intake at meals  Nutrition Goal Status: new  Communication of RD Recs: reviewed with RN

## 2018-10-16 NOTE — PLAN OF CARE
Problem: Patient Care Overview  Goal: Plan of Care Review  Outcome: Ongoing (interventions implemented as appropriate)  Pt safety maintained. Bed in low and locked position. Pt sats remained 93@ and above on 3.5L high flow. NSR on tele. No acute distress noted. Will continue to monitor.

## 2018-10-16 NOTE — PROGRESS NOTES
" [facility]  Adult Nutrition  Progress Note    SUMMARY       Recommendations    Recommendation/Intervention:   1. Continue to encourage intake at meals    Goals:   Pt will consume at least 50-75% intake at meals  Nutrition Goal Status: new  Communication of RD Recs: reviewed with RN    Reason for Assessment  Reason for Assessment: RD follow-up  Diagnosis: pulmonary disease(COPD)  Relevant Medical History: COPD, HTN   Interdisciplinary Rounds: did not attend  General Information Comments: Pt now on Regular diet. Reports good appetite and good intake at recent meals. No NFPE needed.  Nutrition Discharge Planning: d/c on cardiac diet     Nutrition Risk Screen  Nutrition Risk Screen: no indicators present    Nutrition/Diet History  Food Preferences: no Sabianist or cultural food prefs identified  Do you have any cultural, spiritual, Sabianist conflicts, given your current situation?: Christian  Factors Affecting Nutritional Intake: (continous Bipap)    Anthropometrics  Temp: 98.2 °F (36.8 °C)  Height Method: Stated  Height: 6' 2" (188 cm)  Height (inches): 74 in  Weight Method: Bed Scale  Weight: 80 kg (176 lb 5.9 oz)  Weight (lb): 176.37 lb  Ideal Body Weight (IBW), Male: 190 lb  % Ideal Body Weight, Male (lb): 92.83 lb  BMI (Calculated): 22.7  BMI Grade: 18.5-24.9 - normal     Lab/Procedures/Meds  Pertinent Labs Reviewed: reviewed  Pertinent Labs Comments: BUN 28H, GLu 169H  Pertinent Medications Reviewed: reviewed  Pertinent Medications Comments: enoxaparin    Physical Findings/Assessment  Overall Physical Appearance: (WDL)  Tubes: (none)  Oral/Mouth Cavity: tooth/teeth missing  Skin: (Marcel 20-intact)    Estimated/Assessed Needs  Weight Used For Calorie Calculations: 80.6 kg (177 lb 11.1 oz)  Energy Calorie Requirements (kcal): 2994-6639 kcal/d  Energy Need Method: Kcal/kg(25-30 kcal/d)  Protein Requirements: 65-81 g/d(.8-1.0 g/kg)  Weight Used For Protein Calculations: 80.6 kg (177 lb 11.1 " oz)  Fluid Requirements (mL): 1 ml/kcal or per MD  Fluid Need Method: RDA Method  RDA Method (mL): 2015     Nutrition Prescription Ordered  Current Diet Order: Regular    Evaluation of Received Nutrient/Fluid Intake  Comments: LBM 10/15  % Intake of Estimated Energy Needs: 50 - 75 %  % Meal Intake: 50 - 75 %    Nutrition Risk  Level of Risk/Frequency of Follow-up: (f/u 2x/wk )     Assessment and Plan  Nutrition Problem  Increased nutrient needs    Related to (etiology):   COPD    Signs and Symptoms (as evidenced by):   Need for continuous bipap    Interventions/Recommendations (treatment strategy):  See recs    Nutrition Diagnosis Status:   Improving     Monitor and Evaluation  Food and Nutrient Intake: energy intake, food and beverage intake  Food and Nutrient Adminstration: diet order  Knowledge/Beliefs/Attitudes: food and nutrition knowledge/skill  Physical Activity and Function: nutrition-related ADLs and IADLs  Anthropometric Measurements: weight change, weight  Biochemical Data, Medical Tests and Procedures: electrolyte and renal panel, gastrointestinal profile, glucose/endocrine profile, inflammatory profile, lipid profile  Nutrition-Focused Physical Findings: overall appearance     Nutrition Follow-Up  RD Follow-up?: Yes

## 2018-10-16 NOTE — SUBJECTIVE & OBJECTIVE
Interval History: See above.    Review of Systems   Constitutional: Negative for chills and fever.   Gastrointestinal: Negative for nausea and vomiting.     Objective:     Vital Signs (Most Recent):  Temp: 97.6 °F (36.4 °C) (10/16/18 1217)  Pulse: 99 (10/16/18 1217)  Resp: 16 (10/16/18 1217)  BP: 135/86 (10/16/18 1217)  SpO2: (!) 92 % (10/16/18 1105) Vital Signs (24h Range):  Temp:  [97.1 °F (36.2 °C)-99.1 °F (37.3 °C)] 97.6 °F (36.4 °C)  Pulse:  [] 99  Resp:  [16-22] 16  SpO2:  [91 %-98 %] 92 %  BP: (118-148)/(79-96) 135/86     Weight: 80 kg (176 lb 5.9 oz)  Body mass index is 22.64 kg/m².    Intake/Output Summary (Last 24 hours) at 10/16/2018 1247  Last data filed at 10/16/2018 1045  Gross per 24 hour   Intake 1055 ml   Output 1351 ml   Net -296 ml      Physical Exam   Constitutional: He is oriented to person, place, and time. He appears well-developed. No distress.   Pulmonary/Chest: No respiratory distress. He has wheezes.   Neurological: He is alert and oriented to person, place, and time.   Psychiatric: He has a normal mood and affect.   Nursing note and vitals reviewed.      Significant Labs: All pertinent labs within the past 24 hours have been reviewed.    Significant Imaging: I have reviewed all pertinent imaging results/findings within the past 24 hours.   X-Ray Chest AP Portable 10/11/18:   FINDINGS:  Monitoring leads overlie the chest.  The lungs are symmetrically hyperinflated with bilateral diffuse nonspecific interstitial coarsening similar to prior.  No large consolidation, pleural effusion or pneumothorax.  Heart is not enlarged.  Calcific atherosclerosis with grossly stable mediastinal contours and hilar regions.  No acute osseous process seen.  Impression: Chronic findings suggestive of COPD without detrimental change or radiographic acute intrathoracic process seen.  No focal consolidation.

## 2018-10-16 NOTE — PLAN OF CARE
Problem: Patient Care Overview  Goal: Plan of Care Review  Outcome: Ongoing (interventions implemented as appropriate)  The proper method of use, as well as anticipated side effects, of this metered-dose inhaler are discussed and demonstrated to the patient.  The proper method of use, as well as anticipated side effects, of this aerosol treatment are discussed and demonstrated to the patient.  Patient on oxygen with documented flow.  Will attempt to wean per O2 order protocol.  Will continue to monitor.

## 2018-10-16 NOTE — ASSESSMENT & PLAN NOTE
Acute respiratory failure with hypoxia  Nebulizer treatments.  Fluticasone-vilanterol.  Methylprednisolone.  Wean O2.

## 2018-10-17 PROBLEM — J96.01 ACUTE RESPIRATORY FAILURE WITH HYPOXIA: Status: RESOLVED | Noted: 2018-01-01 | Resolved: 2018-01-01

## 2018-10-17 NOTE — PROGRESS NOTES
.Pharmacy New Medication Education    Patient accepted medication education.    Pharmacy educated patient on name and purpose of medications and possible side effects, using the teach-back method.     Current Inpatient Medication Orders   acetaminophen tablet 650 mg   albuterol sulfate nebulizer solution 2.5 mg   albuterol-ipratropium 2.5 mg-0.5 mg/3 mL nebulizer solution 3 mL   amLODIPine tablet 10 mg   cetirizine tablet 10 mg   enoxaparin injection 40 mg   fluticasone-vilanterol 200-25 mcg/dose diskus inhaler 1 puff   guaiFENesin 12 hr tablet 1,200 mg   hydrALAZINE injection 10 mg   hydroCHLOROthiazide tablet 25 mg   HYDROcodone-acetaminophen 5-325 mg per tablet 1 tablet   influenza (FLUZONE QUADRIVALENT) vaccine 0.5 mL   methylPREDNISolone sodium succinate injection 40 mg   nicotine 21 mg/24 hr 1 patch   ramelteon tablet 8 mg   senna-docusate 8.6-50 mg per tablet 1 tablet   sodium chloride 0.9% flush 3 mL   sodium chloride 3% nebulizer solution 4 mL       Learners of pharmacy medication education included:  Patient    Patient +/- learner response:  Verbalized Understanding, Teachback

## 2018-10-17 NOTE — PLAN OF CARE
Problem: Patient Care Overview  Goal: Plan of Care Review  Outcome: Ongoing (interventions implemented as appropriate)  Patient on oxygen with documented flow. Will attempt to wean per protocol.  Patient given aerosol treatment with no adverse reactions noted. Patient instructed on proper use.  The proper method of use, as well as anticipated side effects, of this metered-dose inhaler are discussed and demonstrated to the patient.  No distress. Will continue to monitor.

## 2018-10-17 NOTE — SUBJECTIVE & OBJECTIVE
Interval History: Currently on 1 liter nasal cannula.    Review of Systems   Constitutional: Negative for chills and fever.   Gastrointestinal: Negative for nausea and vomiting.     Objective:     Vital Signs (Most Recent):  Temp: 97.2 °F (36.2 °C) (10/17/18 0745)  Pulse: 107 (10/17/18 0800)  Resp: 16 (10/17/18 0714)  BP: (!) 160/97 (10/17/18 0747)  SpO2: (!) 94 % (10/17/18 0745) Vital Signs (24h Range):  Temp:  [97.2 °F (36.2 °C)-97.6 °F (36.4 °C)] 97.2 °F (36.2 °C)  Pulse:  [] 107  Resp:  [14-22] 16  SpO2:  [92 %-95 %] 94 %  BP: (119-160)/() 160/97     Weight: 80 kg (176 lb 5.9 oz)  Body mass index is 22.64 kg/m².    Intake/Output Summary (Last 24 hours) at 10/17/2018 1049  Last data filed at 10/17/2018 0824  Gross per 24 hour   Intake 570 ml   Output 1000 ml   Net -430 ml      Physical Exam   Constitutional: He is oriented to person, place, and time. He appears well-developed. No distress.   Pulmonary/Chest: No respiratory distress. He has wheezes.   Neurological: He is alert and oriented to person, place, and time.   Psychiatric: He has a normal mood and affect.   Nursing note and vitals reviewed.      Significant Labs: All pertinent labs within the past 24 hours have been reviewed.    Significant Imaging: I have reviewed all pertinent imaging results/findings within the past 24 hours.

## 2018-10-17 NOTE — PLAN OF CARE
Problem: Patient Care Overview  Goal: Plan of Care Review  Outcome: Ongoing (interventions implemented as appropriate)  Reviewed the plan of care with the pt. Pt denies any pain or SOB. Pt slept well overnight.  No true red alarms noted on tele. Safety measures are in place. The pt verbalizes full understanding of their plan of care.

## 2018-10-17 NOTE — ASSESSMENT & PLAN NOTE
Acute respiratory failure with hypoxia  Nebulizer treatments.  Fluticasone-vilanterol.  Change methylprednisolone to prednisone.  Wean O2.

## 2018-10-17 NOTE — PROGRESS NOTES
[facility]  Progress Note    Patient Name: Abhijit Marie  MRN: [id number]  Patient Class: IP- Inpatient   Admission Date: 10/11/2018  Length of Stay: 5 days  Attending Physician: Chai Mann MD  Primary Care Provider: Primary Doctor No        Subjective:     Principal Problem:Chronic obstructive pulmonary disease with acute exacerbation    HPI:  Abhijit Marie is a 62 y.o. black man with hypertension, cigarette smoking, and chronic obstructive pulmonary disease.  He lives in [place], [place].  He is .  He works for [employer].              He presented to [facility] Emergency [facility] on Thursday 10/11/18 with respiratory distress.  He started becoming short of breath on Monday 10/8/18 and took off work for 3 days, then returned to work and overexerted himself.  In the ED, oxygen saturations were in the 80s with exertion, and pulse and blood pressure were elevated.  Troponin was slightly elevated at 0.032.  All other labs were within normal limits.  Chest X-ray showed changes of COPD.  He was given 2 nebulizer treatments, IV methylprednisolone, and Fioricet for headache.  His room air oxygen saturations did not improve despite treatment.  He was admitted to [facility].    Hospital Course:  He continued to have labored work of breathing and was placed on BiPA and transferred to the ICU.  He received another hour long nebulizer treatment with improvement.  He was weaned to high flow nasal cannula.  Respiratory culture showed normal respiratory lyubov x 2.  Flu swab was negative.  Cetirizine was added.  He required intermittent BiPAP on the first two days of the hospitalization.  He was transferred out of the ICU on 10/15/18.  Smoking cessation was discussed again, as it had been during his last hospitalization for COPD exacerbation.    Interval History: Currently on 1 liter nasal cannula.    Review of Systems    Constitutional: Negative for chills and fever.   Gastrointestinal: Negative for nausea and vomiting.     Objective:     Vital Signs (Most Recent):  Temp: 97.2 °F (36.2 °C) (10/17/18 0745)  Pulse: 107 (10/17/18 0800)  Resp: 16 (10/17/18 0714)  BP: (!) 160/97 (10/17/18 0747)  SpO2: (!) 94 % (10/17/18 0745) Vital Signs (24h Range):  Temp:  [97.2 °F (36.2 °C)-97.6 °F (36.4 °C)] 97.2 °F (36.2 °C)  Pulse:  [] 107  Resp:  [14-22] 16  SpO2:  [92 %-95 %] 94 %  BP: (119-160)/() 160/97     Weight: 80 kg (176 lb 5.9 oz)  Body mass index is 22.64 kg/m².    Intake/Output Summary (Last 24 hours) at 10/17/2018 1049  Last data filed at 10/17/2018 0824  Gross per 24 hour   Intake 570 ml   Output 1000 ml   Net -430 ml      Physical Exam   Constitutional: He is oriented to person, place, and time. He appears well-developed. No distress.   Pulmonary/Chest: No respiratory distress. He has wheezes.   Neurological: He is alert and oriented to person, place, and time.   Psychiatric: He has a normal mood and affect.   Nursing note and vitals reviewed.      Significant Labs: All pertinent labs within the past 24 hours have been reviewed.    Significant Imaging: I have reviewed all pertinent imaging results/findings within the past 24 hours.     Assessment/Plan:      * Chronic obstructive pulmonary disease with acute exacerbation    Acute respiratory failure with hypoxia  Nebulizer treatments.  Fluticasone-vilanterol.  Change methylprednisolone to prednisone.  Wean O2.        Essential hypertension    Continue home hydrochlorothiazide.  Started on amlodipine.        Tobacco abuse    Nicotine patch.  Motivated to stop smoking.          VTE Risk Mitigation (From admission, onward)        Ordered     enoxaparin injection 40 mg  Daily      10/11/18 2111     IP VTE LOW RISK PATIENT  Once      10/11/18 2111              Chai Mann MD  Department of Hospital Medicine   [facility]

## 2018-10-18 PROBLEM — J44.1 COPD EXACERBATION: Status: RESOLVED | Noted: 2018-01-01 | Resolved: 2018-01-01

## 2018-10-18 NOTE — PLAN OF CARE
Re:  Abhijit Marie, WORK / SCHOOL EXCUSE    Date: 10/18/2018           [facility]       Rogelio Grant MD, [facility]       MD Shanel Villaseñor PA-C Renee Melancon, PA-C Kristin Stein, PA-C Arekeva Selmon, ARYAN  [address] [place] [address]  Office: [phone]  Fax: [phone]     To whom it may concern:    Mr. Abhijit Marie has been hospitalized at the [facility] since 10/11/2018.  Please excuse the patient from duties.  Patient may return on Monday 10/22/2018.  No restrictions.     Please contact me if you have any questions.                __________________________  Chai Mann MD

## 2018-10-18 NOTE — PLAN OF CARE
Future Appointments   Date Time Provider Department Center   10/22/2018 10:00 AM Shakila Millan MD [facility]     Discharge rounds on patient. Discussed followup appointments, blue discharge folder, discharge nurse will go over home medications and reasons for medications and final discharge instructions. All patient/caregiver questions answered. Patient verbalized understanding.         10/18/18 1056   Final Note   Assessment Type Final Discharge Note   Discharge Disposition Home   Hospital Follow Up  Appt(s) scheduled? Yes   Discharge plans and expectations educations in teach back method with documentation complete? Yes   Right Care Referral Info   Post Acute Recommendation No Care     Freida Gutierres, RN, CCM, CMSRN  RN Transition Navigator  [phone]

## 2018-10-18 NOTE — PLAN OF CARE
VN cued into pt's room for introduction.   VN informed pt that VN would be working along side bedside nurse and PCT throughout shift. Patient educated on the reasons for continued use of prednisone at this time, patient voiced back understanding.    VN reviewed safety measures.  Educated patient on necessity of accurate I&Os and to use urinal. Pt receptive to education.   Allowed time for questions. All questions answered.

## 2018-10-18 NOTE — DISCHARGE SUMMARY
[facility]  Discharge Summary      Patient Name: Abhijit Marie  MRN: [id number]  Admission Date: 10/11/2018  Hospital Length of Stay: 6 days  Discharge Date and Time: 10/18/2018 11:57 AM  Attending Physician: Chai Mann MD   Discharging Provider: Chai Mann MD  Primary Care Provider: Primary Doctor No      HPI:   Abhijit Marie is a 62 y.o. black man with hypertension, cigarette smoking, and chronic obstructive pulmonary disease.  He lives in [place], [place].  He is .  He works for [employer].              He presented to [facility] Emergency Department on Thursday 10/11/18 with respiratory distress.  He started becoming short of breath on Monday 10/8/18 and took off work for 3 days, then returned to work and overexerted himself.  In the ED, oxygen saturations were in the 80s with exertion, and pulse and blood pressure were elevated.  Troponin was slightly elevated at 0.032.  All other labs were within normal limits.  Chest X-ray showed changes of COPD.  He was given 2 nebulizer treatments, IV methylprednisolone, and Fioricet for headache.  His room air oxygen saturations did not improve despite treatment.  He was admitted to [facility].      Hospital Course:   He continued to have labored work of breathing and was placed on BiPA and transferred to the ICU.  He received another hour long nebulizer treatment with improvement.  He was weaned to high flow nasal cannula.  Respiratory culture showed normal respiratory lyubov x 2.  Flu swab was negative.  Cetirizine was added.  He required intermittent BiPAP on the first two days of the hospitalization.  He was transferred out of the ICU on 10/15/18.  Smoking cessation was discussed again, as it had been during his last hospitalization for COPD exacerbation.  He was weaned to room air by the evening of 10/17/18.  He was discharged home on 10/18/18 with prescriptions for  amlodipine, fluticasone-vilanterol, and 4 days of prednisone.  His albuterol inhaler and nebulizer solution were refilled.     Consults:   Consults (From admission, onward)        Status Ordering Provider     Inpatient consult to Registered Dietitian/Nutritionist  Once     Provider:  (Not yet assigned)    Completed NORAH OROZCO     Inpatient consult to Social Work  Once     Provider:  (Not yet assigned)    Acknowledged NORAH OROZCO     Inpatient consult to Social Work  Once     Provider:  (Not yet assigned)    Acknowledged ROBIN ORTIZ        Final Active Diagnoses:    Diagnosis Date Noted POA    PRINCIPAL PROBLEM:  Chronic obstructive pulmonary disease with acute exacerbation [J44.1] 12/13/2015 Yes    Essential hypertension [I10] 12/13/2015 Yes     Chronic    Tobacco abuse [Z72.0] 12/02/2014 Yes     Chronic      Problems Resolved During this Admission:    Diagnosis Date Noted Date Resolved POA    COPD exacerbation [J44.1] 10/11/2018 10/18/2018 Yes    Acute respiratory failure with hypoxia [J96.01] 06/25/2018 10/17/2018 Yes       Discharged Condition: good    Disposition: Home or Self Care    Follow Up:  Follow-up Information     Shakial Millan MD On 10/22/2018.    Specialty:  Hospitalist  Why:  TIME:10:00  Contact information:  [address]  [address]  [facility] [facility] [address]  [phone]             Schedule an appointment as soon as possible for a visit with [facility] - Smoking Cessation.    Specialty:  Smoking Cessation  Contact information:  [address]  [facility] [id number] [phone]               Patient Instructions:      Diet Adult Regular     Notify your health care provider if you experience any of the following:  difficulty breathing or increased cough     Activity as tolerated       Significant Diagnostic Studies:   X-Ray Chest AP Portable 10/11/18:   FINDINGS:  Monitoring leads overlie the chest.  The lungs are symmetrically  hyperinflated with bilateral diffuse nonspecific interstitial coarsening similar to prior.  No large consolidation, pleural effusion or pneumothorax.  Heart is not enlarged.  Calcific atherosclerosis with grossly stable mediastinal contours and hilar regions.  No acute osseous process seen.  Impression: Chronic findings suggestive of COPD without detrimental change or radiographic acute intrathoracic process seen.  No focal consolidation.     Medications:  Reconciled Home Medications:      Medication List      START taking these medications    amLODIPine 10 MG tablet  Commonly known as:  NORVASC  Take 1 tablet (10 mg total) by mouth once daily.  Start taking on:  10/19/2018     BREO ELLIPTA 200-25 mcg/dose Dsdv diskus inhaler  Generic drug:  fluticasone-vilanterol  Inhale 1 puff into the lungs once daily. Controller     predniSONE 20 MG tablet  Commonly known as:  DELTASONE  Take 2 tablets (40 mg total) by mouth once daily. for 4 days  Start taking on:  10/19/2018        CHANGE how you take these medications    * albuterol 2.5 mg /3 mL (0.083 %) nebulizer solution  Commonly known as:  PROVENTIL  Take 3 mLs (2.5 mg total) by nebulization every 4 (four) hours as needed for Wheezing or Shortness of Breath. Rescue  What changed:    · when to take this  · reasons to take this     * VENTOLIN HFA 90 mcg/actuation inhaler  Generic drug:  albuterol  Inhale 1-2 puffs into the lungs every 4 (four) hours as needed for Wheezing or Shortness of Breath.  What changed:    · when to take this  · reasons to take this         * This list has 2 medication(s) that are the same as other medications prescribed for you. Read the directions carefully, and ask your doctor or other care provider to review them with you.            CONTINUE taking these medications    hydroCHLOROthiazide 25 MG tablet  Commonly known as:  HYDRODIURIL  Take 1 tablet (25 mg total) by mouth once daily.     nicotine 21 mg/24 hr  Commonly known as:  NICODERM  CQ  Place 1 patch onto the skin once daily. No prescription needed.        ASK your doctor about these medications    nebulizer and compressor [place]  use as directed            Indwelling Lines/Drains at time of discharge: None    Time spent on the discharge of patient: 35 minutes  Patient was seen and examined on the date of discharge and determined to be suitable for discharge.         Chai Mann MD  Department of Hospital Medicine  [facility]

## 2018-10-18 NOTE — PLAN OF CARE
Plan of care reviewed with patient, understanding verbalized.  Pt remains SR on tele. No complaints overnight.  Bed alarm on, call light in reach, fall precautions in place.  Report given to oncoming nurse.

## 2018-10-18 NOTE — PLAN OF CARE
Problem: Patient Care Overview  Goal: Plan of Care Review  Outcome: Ongoing (interventions implemented as appropriate)  Pt on RA with documented sats.93. The proper method of use, as well as anticipated side effects, of this metered-dose inhaler are discussed and demonstrated to the patient. The proper method of use, as well as anticipated side effects, of this aerosol treatment are discussed and demonstrated to the patient. Will continue to monitor.

## 2018-10-18 NOTE — NURSING
Pts tele monitor removed and cleaned. IV removed with catheter intact.Went over discharge meds and appts with pt. Flu vaccine given to left deltoid. Pt denies pain when asked. Brother here to take pt home.

## 2019-01-01 ENCOUNTER — HOSPITAL ENCOUNTER (INPATIENT)
Facility: HOSPITAL | Age: 64
LOS: 1 days | DRG: 208 | End: 2019-04-05
Attending: FAMILY MEDICINE | Admitting: FAMILY MEDICINE
Payer: OTHER MISCELLANEOUS

## 2019-01-01 ENCOUNTER — HOSPITAL ENCOUNTER (INPATIENT)
Facility: HOSPITAL | Age: 64
LOS: 8 days | Discharge: HOSPICE | DRG: 208 | End: 2019-04-05
Attending: EMERGENCY MEDICINE | Admitting: HOSPITALIST
Payer: MEDICAID

## 2019-01-01 ENCOUNTER — HOSPITAL ENCOUNTER (EMERGENCY)
Facility: HOSPITAL | Age: 64
Discharge: HOME | End: 2019-03-02
Attending: EMERGENCY MEDICINE
Payer: MEDICAID

## 2019-01-01 ENCOUNTER — TELEPHONE (OUTPATIENT)
Dept: OTOLARYNGOLOGY | Facility: CLINIC | Age: 64
End: 2019-01-01

## 2019-01-01 VITALS
DIASTOLIC BLOOD PRESSURE: 94 MMHG | HEIGHT: 76 IN | TEMPERATURE: 99 F | WEIGHT: 209 LBS | SYSTOLIC BLOOD PRESSURE: 151 MMHG | RESPIRATION RATE: 20 BRPM | OXYGEN SATURATION: 93 % | HEART RATE: 112 BPM | BODY MASS INDEX: 25.45 KG/M2

## 2019-01-01 VITALS
TEMPERATURE: 98 F | WEIGHT: 200 LBS | BODY MASS INDEX: 25.67 KG/M2 | HEART RATE: 66 BPM | OXYGEN SATURATION: 100 % | DIASTOLIC BLOOD PRESSURE: 90 MMHG | HEIGHT: 74 IN | RESPIRATION RATE: 26 BRPM | SYSTOLIC BLOOD PRESSURE: 154 MMHG

## 2019-01-01 VITALS — DIASTOLIC BLOOD PRESSURE: 35 MMHG | OXYGEN SATURATION: 60 % | SYSTOLIC BLOOD PRESSURE: 65 MMHG

## 2019-01-01 DIAGNOSIS — L02.91 ABSCESS: Primary | ICD-10-CM

## 2019-01-01 DIAGNOSIS — Z71.89 GOALS OF CARE, COUNSELING/DISCUSSION: ICD-10-CM

## 2019-01-01 DIAGNOSIS — Z45.2 ENCOUNTER FOR CENTRAL LINE PLACEMENT: ICD-10-CM

## 2019-01-01 DIAGNOSIS — L03.90 CELLULITIS, UNSPECIFIED CELLULITIS SITE: ICD-10-CM

## 2019-01-01 DIAGNOSIS — I51.89 DIASTOLIC DYSFUNCTION: ICD-10-CM

## 2019-01-01 DIAGNOSIS — I46.9 CARDIAC ARREST: ICD-10-CM

## 2019-01-01 DIAGNOSIS — J96.00 RESPIRATORY FAILURE, ACUTE: ICD-10-CM

## 2019-01-01 DIAGNOSIS — I46.9 CARDIAC ARREST WITH SUCCESSFUL RESUSCITATION: ICD-10-CM

## 2019-01-01 DIAGNOSIS — Z51.5 PALLIATIVE CARE ENCOUNTER: ICD-10-CM

## 2019-01-01 DIAGNOSIS — Z71.89 COUNSELING REGARDING ADVANCED CARE PLANNING AND GOALS OF CARE: ICD-10-CM

## 2019-01-01 DIAGNOSIS — Z46.59 ENCOUNTER FOR NASOGASTRIC (NG) TUBE PLACEMENT: ICD-10-CM

## 2019-01-01 DIAGNOSIS — R06.02 SOB (SHORTNESS OF BREATH): Primary | ICD-10-CM

## 2019-01-01 DIAGNOSIS — I49.9 CARDIAC RHYTHM DISORDER OR DISTURBANCE OR CHANGE: ICD-10-CM

## 2019-01-01 DIAGNOSIS — Z01.89 ENCOUNTER FOR IMAGING STUDY TO CONFIRM NASOGASTRIC (NG) TUBE PLACEMENT: ICD-10-CM

## 2019-01-01 LAB
ALBUMIN SERPL BCP-MCNC: 2.3 G/DL (ref 3.5–5.2)
ALBUMIN SERPL BCP-MCNC: 2.6 G/DL (ref 3.5–5.2)
ALBUMIN SERPL BCP-MCNC: 2.7 G/DL (ref 3.5–5.2)
ALBUMIN SERPL BCP-MCNC: 2.8 G/DL (ref 3.5–5.2)
ALBUMIN SERPL BCP-MCNC: 3 G/DL (ref 3.5–5.2)
ALBUMIN SERPL BCP-MCNC: 3.2 G/DL (ref 3.5–5.2)
ALBUMIN SERPL BCP-MCNC: 3.7 G/DL (ref 3.5–5.2)
ALLENS TEST: ABNORMAL
ALP SERPL-CCNC: 107 U/L (ref 55–135)
ALP SERPL-CCNC: 54 U/L (ref 55–135)
ALP SERPL-CCNC: 57 U/L (ref 55–135)
ALP SERPL-CCNC: 58 U/L (ref 55–135)
ALP SERPL-CCNC: 59 U/L (ref 55–135)
ALP SERPL-CCNC: 60 U/L (ref 55–135)
ALP SERPL-CCNC: 62 U/L (ref 55–135)
ALP SERPL-CCNC: 68 U/L (ref 55–135)
ALP SERPL-CCNC: 96 U/L (ref 55–135)
ALT SERPL W/O P-5'-P-CCNC: 132 U/L (ref 10–44)
ALT SERPL W/O P-5'-P-CCNC: 164 U/L (ref 10–44)
ALT SERPL W/O P-5'-P-CCNC: 166 U/L (ref 10–44)
ALT SERPL W/O P-5'-P-CCNC: 209 U/L (ref 10–44)
ALT SERPL W/O P-5'-P-CCNC: 43 U/L (ref 10–44)
ALT SERPL W/O P-5'-P-CCNC: 54 U/L (ref 10–44)
ALT SERPL W/O P-5'-P-CCNC: 73 U/L (ref 10–44)
ALT SERPL W/O P-5'-P-CCNC: 96 U/L (ref 10–44)
ALT SERPL W/O P-5'-P-CCNC: 99 U/L (ref 10–44)
AMPHET+METHAMPHET UR QL: NEGATIVE
ANION GAP SERPL CALC-SCNC: 11 MMOL/L (ref 8–16)
ANION GAP SERPL CALC-SCNC: 12 MMOL/L (ref 8–16)
ANION GAP SERPL CALC-SCNC: 12 MMOL/L (ref 8–16)
ANION GAP SERPL CALC-SCNC: 13 MMOL/L (ref 8–16)
ANION GAP SERPL CALC-SCNC: 14 MMOL/L (ref 8–16)
ANION GAP SERPL CALC-SCNC: 14 MMOL/L (ref 8–16)
ANION GAP SERPL CALC-SCNC: 15 MMOL/L (ref 8–16)
ANION GAP SERPL CALC-SCNC: 15 MMOL/L (ref 8–16)
ANION GAP SERPL CALC-SCNC: 16 MMOL/L (ref 8–16)
ANION GAP SERPL CALC-SCNC: 16 MMOL/L (ref 8–16)
ANION GAP SERPL CALC-SCNC: 22 MMOL/L (ref 8–16)
ANION GAP SERPL CALC-SCNC: 5 MMOL/L (ref 8–16)
ANION GAP SERPL CALC-SCNC: 5 MMOL/L (ref 8–16)
ANION GAP SERPL CALC-SCNC: 7 MMOL/L (ref 8–16)
ANION GAP SERPL CALC-SCNC: 8 MMOL/L (ref 8–16)
ANION GAP SERPL CALC-SCNC: 9 MMOL/L (ref 8–16)
AORTIC ROOT ANNULUS: 3.25 CM
AORTIC VALVE CUSP SEPERATION: 1.97 CM
APTT BLDCRRT: 25.1 SEC (ref 21–32)
APTT BLDCRRT: 28.7 SEC (ref 21–32)
APTT BLDCRRT: 30.4 SEC (ref 21–32)
APTT BLDCRRT: 32.3 SEC (ref 21–32)
AST SERPL-CCNC: 125 U/L (ref 10–40)
AST SERPL-CCNC: 153 U/L (ref 10–40)
AST SERPL-CCNC: 39 U/L (ref 10–40)
AST SERPL-CCNC: 54 U/L (ref 10–40)
AST SERPL-CCNC: 77 U/L (ref 10–40)
AST SERPL-CCNC: 82 U/L (ref 10–40)
AST SERPL-CCNC: 92 U/L (ref 10–40)
AST SERPL-CCNC: 98 U/L (ref 10–40)
AST SERPL-CCNC: 99 U/L (ref 10–40)
AV INDEX (PROSTH): 0.46
AV MEAN GRADIENT: 1.78 MMHG
AV PEAK GRADIENT: 2.82 MMHG
AV VELOCITY RATIO: 0.72
BACTERIA #/AREA URNS HPF: ABNORMAL /HPF
BACTERIA BLD CULT: NORMAL
BACTERIA BLD CULT: NORMAL
BACTERIA SPEC AEROBE CULT: NORMAL
BACTERIA THROAT CULT: NORMAL
BARBITURATES UR QL SCN>200 NG/ML: NEGATIVE
BASOPHILS # BLD AUTO: 0 K/UL (ref 0–0.2)
BASOPHILS # BLD AUTO: 0.01 K/UL (ref 0–0.2)
BASOPHILS # BLD AUTO: 0.01 K/UL (ref 0–0.2)
BASOPHILS # BLD AUTO: 0.02 K/UL (ref 0–0.2)
BASOPHILS # BLD AUTO: 0.02 K/UL (ref 0–0.2)
BASOPHILS # BLD AUTO: 0.09 K/UL (ref 0–0.2)
BASOPHILS # BLD AUTO: ABNORMAL K/UL (ref 0–0.2)
BASOPHILS NFR BLD: 0 % (ref 0–1.9)
BASOPHILS NFR BLD: 0.1 % (ref 0–1.9)
BASOPHILS NFR BLD: 0.4 % (ref 0–1.9)
BENZODIAZ UR QL SCN>200 NG/ML: NEGATIVE
BILIRUB DIRECT SERPL-MCNC: 0.1 MG/DL (ref 0.1–0.3)
BILIRUB DIRECT SERPL-MCNC: 0.1 MG/DL (ref 0.1–0.3)
BILIRUB DIRECT SERPL-MCNC: 0.2 MG/DL (ref 0.1–0.3)
BILIRUB DIRECT SERPL-MCNC: 0.3 MG/DL (ref 0.1–0.3)
BILIRUB DIRECT SERPL-MCNC: 0.3 MG/DL (ref 0.1–0.3)
BILIRUB SERPL-MCNC: 0.2 MG/DL (ref 0.1–1)
BILIRUB SERPL-MCNC: 0.2 MG/DL (ref 0.1–1)
BILIRUB SERPL-MCNC: 0.3 MG/DL (ref 0.1–1)
BILIRUB SERPL-MCNC: 0.4 MG/DL (ref 0.1–1)
BILIRUB SERPL-MCNC: 0.6 MG/DL (ref 0.1–1)
BILIRUB SERPL-MCNC: 0.6 MG/DL (ref 0.1–1)
BILIRUB UR QL STRIP: NEGATIVE
BNP SERPL-MCNC: 215 PG/ML (ref 0–99)
BSA FOR ECHO PROCEDURE: 2.22 M2
BUN SERPL-MCNC: 16 MG/DL (ref 8–23)
BUN SERPL-MCNC: 17 MG/DL (ref 8–23)
BUN SERPL-MCNC: 22 MG/DL (ref 8–23)
BUN SERPL-MCNC: 22 MG/DL (ref 8–23)
BUN SERPL-MCNC: 24 MG/DL (ref 8–23)
BUN SERPL-MCNC: 30 MG/DL (ref 8–23)
BUN SERPL-MCNC: 31 MG/DL (ref 8–23)
BUN SERPL-MCNC: 32 MG/DL (ref 8–23)
BUN SERPL-MCNC: 33 MG/DL (ref 8–23)
BUN SERPL-MCNC: 35 MG/DL (ref 8–23)
BUN SERPL-MCNC: 37 MG/DL (ref 8–23)
BUN SERPL-MCNC: 37 MG/DL (ref 8–23)
BUN SERPL-MCNC: 39 MG/DL (ref 8–23)
BUN SERPL-MCNC: 40 MG/DL (ref 8–23)
BUN SERPL-MCNC: 41 MG/DL (ref 8–23)
BUN SERPL-MCNC: 41 MG/DL (ref 8–23)
BUN SERPL-MCNC: 42 MG/DL (ref 8–23)
BUN SERPL-MCNC: 43 MG/DL (ref 8–23)
BUN SERPL-MCNC: 47 MG/DL (ref 8–23)
BUN SERPL-MCNC: 49 MG/DL (ref 8–23)
BUN SERPL-MCNC: 51 MG/DL (ref 8–23)
BUN SERPL-MCNC: 52 MG/DL (ref 8–23)
BZE UR QL SCN: NEGATIVE
CALCIUM SERPL-MCNC: 8.1 MG/DL (ref 8.7–10.5)
CALCIUM SERPL-MCNC: 8.2 MG/DL (ref 8.7–10.5)
CALCIUM SERPL-MCNC: 8.2 MG/DL (ref 8.7–10.5)
CALCIUM SERPL-MCNC: 8.3 MG/DL (ref 8.7–10.5)
CALCIUM SERPL-MCNC: 8.4 MG/DL (ref 8.7–10.5)
CALCIUM SERPL-MCNC: 8.5 MG/DL (ref 8.7–10.5)
CALCIUM SERPL-MCNC: 8.5 MG/DL (ref 8.7–10.5)
CALCIUM SERPL-MCNC: 8.6 MG/DL (ref 8.7–10.5)
CALCIUM SERPL-MCNC: 8.7 MG/DL (ref 8.7–10.5)
CALCIUM SERPL-MCNC: 8.8 MG/DL (ref 8.7–10.5)
CALCIUM SERPL-MCNC: 8.8 MG/DL (ref 8.7–10.5)
CALCIUM SERPL-MCNC: 8.9 MG/DL (ref 8.7–10.5)
CALCIUM SERPL-MCNC: 9.1 MG/DL (ref 8.7–10.5)
CALCIUM SERPL-MCNC: 9.2 MG/DL (ref 8.7–10.5)
CALCIUM SERPL-MCNC: 9.3 MG/DL (ref 8.7–10.5)
CALCIUM SERPL-MCNC: 9.5 MG/DL (ref 8.7–10.5)
CALCIUM SERPL-MCNC: 9.6 MG/DL (ref 8.7–10.5)
CALCIUM SERPL-MCNC: 9.6 MG/DL (ref 8.7–10.5)
CALCIUM SERPL-MCNC: 9.7 MG/DL (ref 8.7–10.5)
CALCIUM SERPL-MCNC: 9.7 MG/DL (ref 8.7–10.5)
CANNABINOIDS UR QL SCN: NEGATIVE
CHLORIDE SERPL-SCNC: 101 MMOL/L (ref 95–110)
CHLORIDE SERPL-SCNC: 104 MMOL/L (ref 95–110)
CHLORIDE SERPL-SCNC: 106 MMOL/L (ref 95–110)
CHLORIDE SERPL-SCNC: 110 MMOL/L (ref 95–110)
CHLORIDE SERPL-SCNC: 111 MMOL/L (ref 95–110)
CHLORIDE SERPL-SCNC: 112 MMOL/L (ref 95–110)
CHLORIDE SERPL-SCNC: 112 MMOL/L (ref 95–110)
CHLORIDE SERPL-SCNC: 113 MMOL/L (ref 95–110)
CHLORIDE SERPL-SCNC: 114 MMOL/L (ref 95–110)
CHLORIDE SERPL-SCNC: 115 MMOL/L (ref 95–110)
CK SERPL-CCNC: 1138 U/L (ref 20–200)
CK SERPL-CCNC: 557 U/L (ref 20–200)
CK SERPL-CCNC: 560 U/L (ref 20–200)
CLARITY UR: ABNORMAL
CO2 SERPL-SCNC: 18 MMOL/L (ref 23–29)
CO2 SERPL-SCNC: 19 MMOL/L (ref 23–29)
CO2 SERPL-SCNC: 20 MMOL/L (ref 23–29)
CO2 SERPL-SCNC: 21 MMOL/L (ref 23–29)
CO2 SERPL-SCNC: 22 MMOL/L (ref 23–29)
CO2 SERPL-SCNC: 23 MMOL/L (ref 23–29)
CO2 SERPL-SCNC: 23 MMOL/L (ref 23–29)
CO2 SERPL-SCNC: 25 MMOL/L (ref 23–29)
CO2 SERPL-SCNC: 25 MMOL/L (ref 23–29)
CO2 SERPL-SCNC: 26 MMOL/L (ref 23–29)
CO2 SERPL-SCNC: 27 MMOL/L (ref 23–29)
CO2 SERPL-SCNC: 28 MMOL/L (ref 23–29)
CO2 SERPL-SCNC: 32 MMOL/L (ref 23–29)
COLOR UR: YELLOW
CREAT SERPL-MCNC: 0.8 MG/DL (ref 0.5–1.4)
CREAT SERPL-MCNC: 0.9 MG/DL (ref 0.5–1.4)
CREAT SERPL-MCNC: 1 MG/DL (ref 0.5–1.4)
CREAT SERPL-MCNC: 1.2 MG/DL (ref 0.5–1.4)
CREAT SERPL-MCNC: 1.3 MG/DL (ref 0.5–1.4)
CREAT SERPL-MCNC: 1.4 MG/DL (ref 0.5–1.4)
CREAT SERPL-MCNC: 1.5 MG/DL (ref 0.5–1.4)
CREAT SERPL-MCNC: 1.6 MG/DL (ref 0.5–1.4)
CREAT SERPL-MCNC: 1.8 MG/DL (ref 0.5–1.4)
CREAT SERPL-MCNC: 1.8 MG/DL (ref 0.5–1.4)
CREAT SERPL-MCNC: 1.9 MG/DL (ref 0.5–1.4)
CREAT SERPL-MCNC: 1.9 MG/DL (ref 0.5–1.4)
CREAT UR-MCNC: 134.1 MG/DL (ref 23–375)
CV ECHO LV RWT: 0.55 CM
DELSYS: ABNORMAL
DIFFERENTIAL METHOD: ABNORMAL
DOP CALC AO PEAK VEL: 0.84 M/S
DOP CALC AO VTI: 17.36 CM
DOP CALC LVOT PEAK VEL: 0.61 M/S
DOP CALCLVOT PEAK VEL VTI: 8.03 CM
E WAVE DECELERATION TIME: 250.83 MSEC
E/A RATIO: 0.59
ECHO LV POSTERIOR WALL: 1.03 CM (ref 0.6–1.1)
ENTEROVIRUS: POSITIVE
EOSINOPHIL # BLD AUTO: 0 K/UL (ref 0–0.5)
EOSINOPHIL # BLD AUTO: ABNORMAL K/UL (ref 0–0.5)
EOSINOPHIL NFR BLD: 0 % (ref 0–8)
EOSINOPHIL NFR BLD: 0.1 % (ref 0–8)
EOSINOPHIL NFR BLD: 0.3 % (ref 0–8)
ERYTHROCYTE [DISTWIDTH] IN BLOOD BY AUTOMATED COUNT: 14.2 % (ref 11.5–14.5)
ERYTHROCYTE [DISTWIDTH] IN BLOOD BY AUTOMATED COUNT: 14.4 % (ref 11.5–14.5)
ERYTHROCYTE [DISTWIDTH] IN BLOOD BY AUTOMATED COUNT: 14.8 % (ref 11.5–14.5)
ERYTHROCYTE [DISTWIDTH] IN BLOOD BY AUTOMATED COUNT: 15.3 % (ref 11.5–14.5)
ERYTHROCYTE [DISTWIDTH] IN BLOOD BY AUTOMATED COUNT: 15.3 % (ref 11.5–14.5)
ERYTHROCYTE [DISTWIDTH] IN BLOOD BY AUTOMATED COUNT: 15.4 % (ref 11.5–14.5)
ERYTHROCYTE [DISTWIDTH] IN BLOOD BY AUTOMATED COUNT: 15.6 % (ref 11.5–14.5)
ERYTHROCYTE [DISTWIDTH] IN BLOOD BY AUTOMATED COUNT: 15.7 % (ref 11.5–14.5)
ERYTHROCYTE [DISTWIDTH] IN BLOOD BY AUTOMATED COUNT: 15.8 % (ref 11.5–14.5)
ERYTHROCYTE [SEDIMENTATION RATE] IN BLOOD BY WESTERGREN METHOD: 20 MM/H
ERYTHROCYTE [SEDIMENTATION RATE] IN BLOOD BY WESTERGREN METHOD: 20 MM/H
ERYTHROCYTE [SEDIMENTATION RATE] IN BLOOD BY WESTERGREN METHOD: 24 MM/H
ERYTHROCYTE [SEDIMENTATION RATE] IN BLOOD BY WESTERGREN METHOD: 24 MM/H
ERYTHROCYTE [SEDIMENTATION RATE] IN BLOOD BY WESTERGREN METHOD: 30 MM/H
EST. GFR  (AFRICAN AMERICAN): 43 ML/MIN/1.73 M^2
EST. GFR  (AFRICAN AMERICAN): 43 ML/MIN/1.73 M^2
EST. GFR  (AFRICAN AMERICAN): 46 ML/MIN/1.73 M^2
EST. GFR  (AFRICAN AMERICAN): 46 ML/MIN/1.73 M^2
EST. GFR  (AFRICAN AMERICAN): 53 ML/MIN/1.73 M^2
EST. GFR  (AFRICAN AMERICAN): 57 ML/MIN/1.73 M^2
EST. GFR  (AFRICAN AMERICAN): >60 ML/MIN/1.73 M^2
EST. GFR  (NON AFRICAN AMERICAN): 37 ML/MIN/1.73 M^2
EST. GFR  (NON AFRICAN AMERICAN): 37 ML/MIN/1.73 M^2
EST. GFR  (NON AFRICAN AMERICAN): 39 ML/MIN/1.73 M^2
EST. GFR  (NON AFRICAN AMERICAN): 39 ML/MIN/1.73 M^2
EST. GFR  (NON AFRICAN AMERICAN): 45 ML/MIN/1.73 M^2
EST. GFR  (NON AFRICAN AMERICAN): 49 ML/MIN/1.73 M^2
EST. GFR  (NON AFRICAN AMERICAN): 53 ML/MIN/1.73 M^2
EST. GFR  (NON AFRICAN AMERICAN): 58 ML/MIN/1.73 M^2
EST. GFR  (NON AFRICAN AMERICAN): >60 ML/MIN/1.73 M^2
ETHANOL SERPL-MCNC: <10 MG/DL
FIO2: 30
FIO2: 30
FIO2: 40
FIO2: 50
FIO2: 60
FRACTIONAL SHORTENING: 33 % (ref 28–44)
GLUCOSE SERPL-MCNC: 118 MG/DL (ref 70–110)
GLUCOSE SERPL-MCNC: 132 MG/DL (ref 70–110)
GLUCOSE SERPL-MCNC: 132 MG/DL (ref 70–110)
GLUCOSE SERPL-MCNC: 135 MG/DL (ref 70–110)
GLUCOSE SERPL-MCNC: 135 MG/DL (ref 70–110)
GLUCOSE SERPL-MCNC: 139 MG/DL (ref 70–110)
GLUCOSE SERPL-MCNC: 142 MG/DL (ref 70–110)
GLUCOSE SERPL-MCNC: 143 MG/DL (ref 70–110)
GLUCOSE SERPL-MCNC: 143 MG/DL (ref 70–110)
GLUCOSE SERPL-MCNC: 144 MG/DL (ref 70–110)
GLUCOSE SERPL-MCNC: 147 MG/DL (ref 70–110)
GLUCOSE SERPL-MCNC: 147 MG/DL (ref 70–110)
GLUCOSE SERPL-MCNC: 149 MG/DL (ref 70–110)
GLUCOSE SERPL-MCNC: 149 MG/DL (ref 70–110)
GLUCOSE SERPL-MCNC: 150 MG/DL (ref 70–110)
GLUCOSE SERPL-MCNC: 152 MG/DL (ref 70–110)
GLUCOSE SERPL-MCNC: 152 MG/DL (ref 70–110)
GLUCOSE SERPL-MCNC: 153 MG/DL (ref 70–110)
GLUCOSE SERPL-MCNC: 160 MG/DL (ref 70–110)
GLUCOSE SERPL-MCNC: 160 MG/DL (ref 70–110)
GLUCOSE SERPL-MCNC: 170 MG/DL (ref 70–110)
GLUCOSE SERPL-MCNC: 170 MG/DL (ref 70–110)
GLUCOSE SERPL-MCNC: 176 MG/DL (ref 70–110)
GLUCOSE SERPL-MCNC: 176 MG/DL (ref 70–110)
GLUCOSE SERPL-MCNC: 178 MG/DL (ref 70–110)
GLUCOSE SERPL-MCNC: 178 MG/DL (ref 70–110)
GLUCOSE SERPL-MCNC: 185 MG/DL (ref 70–110)
GLUCOSE SERPL-MCNC: 187 MG/DL (ref 70–110)
GLUCOSE SERPL-MCNC: 188 MG/DL (ref 70–110)
GLUCOSE SERPL-MCNC: 195 MG/DL (ref 70–110)
GLUCOSE SERPL-MCNC: 195 MG/DL (ref 70–110)
GLUCOSE SERPL-MCNC: 201 MG/DL (ref 70–110)
GLUCOSE SERPL-MCNC: 201 MG/DL (ref 70–110)
GLUCOSE SERPL-MCNC: 289 MG/DL (ref 70–110)
GLUCOSE SERPL-MCNC: 303 MG/DL (ref 70–110)
GLUCOSE SERPL-MCNC: 310 MG/DL (ref 70–110)
GLUCOSE UR QL STRIP: ABNORMAL
GRAM STN SPEC: NORMAL
HAV IGM SERPL QL IA: NEGATIVE
HBV CORE IGM SERPL QL IA: NEGATIVE
HBV SURFACE AG SERPL QL IA: NEGATIVE
HCO3 UR-SCNC: 18.5 MMOL/L (ref 24–28)
HCO3 UR-SCNC: 20.4 MMOL/L (ref 24–28)
HCO3 UR-SCNC: 21.6 MMOL/L (ref 24–28)
HCO3 UR-SCNC: 23 MMOL/L (ref 24–28)
HCO3 UR-SCNC: 23.1 MMOL/L (ref 24–28)
HCO3 UR-SCNC: 23.9 MMOL/L (ref 24–28)
HCO3 UR-SCNC: 25.1 MMOL/L (ref 24–28)
HCO3 UR-SCNC: 26.9 MMOL/L (ref 24–28)
HCT VFR BLD AUTO: 37.2 % (ref 40–54)
HCT VFR BLD AUTO: 38 % (ref 40–54)
HCT VFR BLD AUTO: 38.4 % (ref 40–54)
HCT VFR BLD AUTO: 38.6 % (ref 40–54)
HCT VFR BLD AUTO: 40.1 % (ref 40–54)
HCT VFR BLD AUTO: 40.2 % (ref 40–54)
HCT VFR BLD AUTO: 42.5 % (ref 40–54)
HCT VFR BLD AUTO: 43 % (ref 40–54)
HCT VFR BLD AUTO: 45.1 % (ref 40–54)
HCV AB SERPL QL IA: NEGATIVE
HGB BLD-MCNC: 12.1 G/DL (ref 14–18)
HGB BLD-MCNC: 12.1 G/DL (ref 14–18)
HGB BLD-MCNC: 12.3 G/DL (ref 14–18)
HGB BLD-MCNC: 12.3 G/DL (ref 14–18)
HGB BLD-MCNC: 12.4 G/DL (ref 14–18)
HGB BLD-MCNC: 12.5 G/DL (ref 14–18)
HGB BLD-MCNC: 12.6 G/DL (ref 14–18)
HGB BLD-MCNC: 13 G/DL (ref 14–18)
HGB BLD-MCNC: 13.7 G/DL (ref 14–18)
HGB BLD-MCNC: 14.2 G/DL (ref 14–18)
HGB UR QL STRIP: ABNORMAL
HUMAN BOCAVIRUS: NOT DETECTED
HUMAN CORONAVIRUS, COMMON COLD VIRUS: NOT DETECTED
HYALINE CASTS #/AREA URNS LPF: 0 /LPF
INFLUENZA A - H1N1-09: NOT DETECTED
INR PPP: 1 (ref 0.8–1.2)
INTERVENTRICULAR SEPTUM: 1.01 CM (ref 0.6–1.1)
KETONES UR QL STRIP: NEGATIVE
LACTATE SERPL-SCNC: 10.4 MMOL/L (ref 0.5–2.2)
LACTATE SERPL-SCNC: 3.6 MMOL/L (ref 0.5–2.2)
LACTATE SERPL-SCNC: 5.7 MMOL/L (ref 0.5–2.2)
LACTATE SERPL-SCNC: 7.2 MMOL/L (ref 0.5–2.2)
LEFT ATRIUM SIZE: 1.21 CM
LEFT INTERNAL DIMENSION IN SYSTOLE: 2.53 CM (ref 2.1–4)
LEFT VENTRICLE DIASTOLIC VOLUME INDEX: 27.12 ML/M2
LEFT VENTRICLE DIASTOLIC VOLUME: 60.48 ML
LEFT VENTRICLE MASS INDEX: 53.2 G/M2
LEFT VENTRICLE SYSTOLIC VOLUME INDEX: 10.4 ML/M2
LEFT VENTRICLE SYSTOLIC VOLUME: 23.09 ML
LEFT VENTRICULAR INTERNAL DIMENSION IN DIASTOLE: 3.76 CM (ref 3.5–6)
LEFT VENTRICULAR MASS: 118.71 G
LEUKOCYTE ESTERASE UR QL STRIP: NEGATIVE
LV LATERAL E/E' RATIO: 10.4
LYMPHOCYTES # BLD AUTO: 0.5 K/UL (ref 1–4.8)
LYMPHOCYTES # BLD AUTO: 0.8 K/UL (ref 1–4.8)
LYMPHOCYTES # BLD AUTO: 1 K/UL (ref 1–4.8)
LYMPHOCYTES # BLD AUTO: 1.4 K/UL (ref 1–4.8)
LYMPHOCYTES # BLD AUTO: 1.9 K/UL (ref 1–4.8)
LYMPHOCYTES # BLD AUTO: 2.2 K/UL (ref 1–4.8)
LYMPHOCYTES # BLD AUTO: ABNORMAL K/UL (ref 1–4.8)
LYMPHOCYTES NFR BLD: 10.3 % (ref 18–48)
LYMPHOCYTES NFR BLD: 13.8 % (ref 18–48)
LYMPHOCYTES NFR BLD: 15.1 % (ref 18–48)
LYMPHOCYTES NFR BLD: 2.8 % (ref 18–48)
LYMPHOCYTES NFR BLD: 3.4 % (ref 18–48)
LYMPHOCYTES NFR BLD: 5.4 % (ref 18–48)
LYMPHOCYTES NFR BLD: 5.9 % (ref 18–48)
LYMPHOCYTES NFR BLD: 6.6 % (ref 18–48)
LYMPHOCYTES NFR BLD: 7 % (ref 18–48)
MAGNESIUM SERPL-MCNC: 2.2 MG/DL (ref 1.6–2.6)
MAGNESIUM SERPL-MCNC: 2.5 MG/DL (ref 1.6–2.6)
MAGNESIUM SERPL-MCNC: 2.6 MG/DL (ref 1.6–2.6)
MAGNESIUM SERPL-MCNC: 2.7 MG/DL (ref 1.6–2.6)
MAGNESIUM SERPL-MCNC: 2.9 MG/DL (ref 1.6–2.6)
MAGNESIUM SERPL-MCNC: 3.1 MG/DL (ref 1.6–2.6)
MAGNESIUM SERPL-MCNC: 3.2 MG/DL (ref 1.6–2.6)
MAGNESIUM SERPL-MCNC: 3.2 MG/DL (ref 1.6–2.6)
MAGNESIUM SERPL-MCNC: 3.3 MG/DL (ref 1.6–2.6)
MAGNESIUM SERPL-MCNC: 3.6 MG/DL (ref 1.6–2.6)
MAGNESIUM SERPL-MCNC: 3.6 MG/DL (ref 1.6–2.6)
MAGNESIUM SERPL-MCNC: 3.7 MG/DL (ref 1.6–2.6)
MAGNESIUM SERPL-MCNC: 3.7 MG/DL (ref 1.6–2.6)
MAGNESIUM SERPL-MCNC: 3.8 MG/DL (ref 1.6–2.6)
MAGNESIUM SERPL-MCNC: 4 MG/DL (ref 1.6–2.6)
MAGNESIUM SERPL-MCNC: 4.2 MG/DL (ref 1.6–2.6)
MCH RBC QN AUTO: 27.5 PG (ref 27–31)
MCH RBC QN AUTO: 27.6 PG (ref 27–31)
MCH RBC QN AUTO: 27.7 PG (ref 27–31)
MCH RBC QN AUTO: 27.8 PG (ref 27–31)
MCH RBC QN AUTO: 28 PG (ref 27–31)
MCH RBC QN AUTO: 28.1 PG (ref 27–31)
MCH RBC QN AUTO: 28.2 PG (ref 27–31)
MCH RBC QN AUTO: 28.2 PG (ref 27–31)
MCH RBC QN AUTO: 28.3 PG (ref 27–31)
MCHC RBC AUTO-ENTMCNC: 30.6 G/DL (ref 32–36)
MCHC RBC AUTO-ENTMCNC: 31.2 G/DL (ref 32–36)
MCHC RBC AUTO-ENTMCNC: 31.3 G/DL (ref 32–36)
MCHC RBC AUTO-ENTMCNC: 31.5 G/DL (ref 32–36)
MCHC RBC AUTO-ENTMCNC: 31.8 G/DL (ref 32–36)
MCHC RBC AUTO-ENTMCNC: 31.9 G/DL (ref 32–36)
MCHC RBC AUTO-ENTMCNC: 31.9 G/DL (ref 32–36)
MCHC RBC AUTO-ENTMCNC: 32 G/DL (ref 32–36)
MCHC RBC AUTO-ENTMCNC: 32.5 G/DL (ref 32–36)
MCV RBC AUTO: 86 FL (ref 82–98)
MCV RBC AUTO: 87 FL (ref 82–98)
MCV RBC AUTO: 87 FL (ref 82–98)
MCV RBC AUTO: 88 FL (ref 82–98)
MCV RBC AUTO: 88 FL (ref 82–98)
MCV RBC AUTO: 89 FL (ref 82–98)
MCV RBC AUTO: 90 FL (ref 82–98)
MCV RBC AUTO: 90 FL (ref 82–98)
MCV RBC AUTO: 91 FL (ref 82–98)
METHADONE UR QL SCN>300 NG/ML: NEGATIVE
MICROSCOPIC COMMENT: ABNORMAL
MIN VOL: 12
MIN VOL: 14.1
MIN VOL: 16.2
MIN VOL: 16.7
MODE: ABNORMAL
MONOCYTES # BLD AUTO: 0.2 K/UL (ref 0.3–1)
MONOCYTES # BLD AUTO: 0.2 K/UL (ref 0.3–1)
MONOCYTES # BLD AUTO: 0.4 K/UL (ref 0.3–1)
MONOCYTES # BLD AUTO: 0.6 K/UL (ref 0.3–1)
MONOCYTES # BLD AUTO: 1 K/UL (ref 0.3–1)
MONOCYTES # BLD AUTO: 1.1 K/UL (ref 0.3–1)
MONOCYTES # BLD AUTO: 1.2 K/UL (ref 0.3–1)
MONOCYTES # BLD AUTO: 1.9 K/UL (ref 0.3–1)
MONOCYTES # BLD AUTO: ABNORMAL K/UL (ref 0.3–1)
MONOCYTES NFR BLD: 1.3 % (ref 4–15)
MONOCYTES NFR BLD: 1.7 % (ref 4–15)
MONOCYTES NFR BLD: 13.3 % (ref 4–15)
MONOCYTES NFR BLD: 2.5 % (ref 4–15)
MONOCYTES NFR BLD: 2.6 % (ref 4–15)
MONOCYTES NFR BLD: 4.5 % (ref 4–15)
MONOCYTES NFR BLD: 5 % (ref 4–15)
MONOCYTES NFR BLD: 5.9 % (ref 4–15)
MONOCYTES NFR BLD: 7.2 % (ref 4–15)
MV PEAK A VEL: 0.88 M/S
MV PEAK E VEL: 0.52 M/S
MYELOCYTES NFR BLD MANUAL: 6 %
NEUTROPHILS # BLD AUTO: 11.3 K/UL (ref 1.8–7.7)
NEUTROPHILS # BLD AUTO: 11.3 K/UL (ref 1.8–7.7)
NEUTROPHILS # BLD AUTO: 15.4 K/UL (ref 1.8–7.7)
NEUTROPHILS # BLD AUTO: 16.1 K/UL (ref 1.8–7.7)
NEUTROPHILS # BLD AUTO: 16.1 K/UL (ref 1.8–7.7)
NEUTROPHILS # BLD AUTO: 17.1 K/UL (ref 1.8–7.7)
NEUTROPHILS # BLD AUTO: 20 K/UL (ref 1.8–7.7)
NEUTROPHILS # BLD AUTO: 7.7 K/UL (ref 1.8–7.7)
NEUTROPHILS NFR BLD: 77 % (ref 38–73)
NEUTROPHILS NFR BLD: 80 % (ref 38–73)
NEUTROPHILS NFR BLD: 80.8 % (ref 38–73)
NEUTROPHILS NFR BLD: 81.5 % (ref 38–73)
NEUTROPHILS NFR BLD: 84.8 % (ref 38–73)
NEUTROPHILS NFR BLD: 87.7 % (ref 38–73)
NEUTROPHILS NFR BLD: 89.8 % (ref 38–73)
NEUTROPHILS NFR BLD: 91.3 % (ref 38–73)
NEUTROPHILS NFR BLD: 91.7 % (ref 38–73)
NEUTS BAND NFR BLD MANUAL: 5 %
NITRITE UR QL STRIP: NEGATIVE
NSE SERPL-MCNC: 118 NG/ML
NSE SERPL-MCNC: 22 NG/ML
NSE SERPL-MCNC: 305 NG/ML
NSE SERPL-MCNC: NORMAL UG/L
OPIATES UR QL SCN: NEGATIVE
PARAINFLUENZA: NOT DETECTED
PCO2 BLDA: 36.2 MMHG (ref 35–45)
PCO2 BLDA: 41.2 MMHG (ref 35–45)
PCO2 BLDA: 45.4 MMHG (ref 35–45)
PCO2 BLDA: 46.7 MMHG (ref 35–45)
PCO2 BLDA: 47.4 MMHG (ref 35–45)
PCO2 BLDA: 59 MMHG (ref 35–45)
PCO2 BLDA: 84 MMHG (ref 35–45)
PCO2 BLDA: 93.7 MMHG (ref 35–45)
PCP UR QL SCN>25 NG/ML: NEGATIVE
PEEP: 5
PH SMN: 7 [PH] (ref 7.35–7.45)
PH SMN: 7.08 [PH] (ref 7.35–7.45)
PH SMN: 7.2 [PH] (ref 7.35–7.45)
PH SMN: 7.24 [PH] (ref 7.35–7.45)
PH SMN: 7.26 [PH] (ref 7.35–7.45)
PH SMN: 7.33 [PH] (ref 7.35–7.45)
PH SMN: 7.37 [PH] (ref 7.35–7.45)
PH SMN: 7.38 [PH] (ref 7.35–7.45)
PH UR STRIP: 7 [PH] (ref 5–8)
PHOSPHATE SERPL-MCNC: 2.7 MG/DL (ref 2.7–4.5)
PHOSPHATE SERPL-MCNC: 2.7 MG/DL (ref 2.7–4.5)
PHOSPHATE SERPL-MCNC: 2.9 MG/DL (ref 2.7–4.5)
PHOSPHATE SERPL-MCNC: 2.9 MG/DL (ref 2.7–4.5)
PHOSPHATE SERPL-MCNC: 3 MG/DL (ref 2.7–4.5)
PHOSPHATE SERPL-MCNC: 3.1 MG/DL (ref 2.7–4.5)
PHOSPHATE SERPL-MCNC: 3.2 MG/DL (ref 2.7–4.5)
PHOSPHATE SERPL-MCNC: 3.3 MG/DL (ref 2.7–4.5)
PHOSPHATE SERPL-MCNC: 3.7 MG/DL (ref 2.7–4.5)
PHOSPHATE SERPL-MCNC: 3.7 MG/DL (ref 2.7–4.5)
PHOSPHATE SERPL-MCNC: 3.8 MG/DL (ref 2.7–4.5)
PHOSPHATE SERPL-MCNC: 3.9 MG/DL (ref 2.7–4.5)
PHOSPHATE SERPL-MCNC: 4.1 MG/DL (ref 2.7–4.5)
PHOSPHATE SERPL-MCNC: 4.2 MG/DL (ref 2.7–4.5)
PHOSPHATE SERPL-MCNC: 4.4 MG/DL (ref 2.7–4.5)
PHOSPHATE SERPL-MCNC: 4.5 MG/DL (ref 2.7–4.5)
PHOSPHATE SERPL-MCNC: 4.6 MG/DL (ref 2.7–4.5)
PHOSPHATE SERPL-MCNC: 4.7 MG/DL (ref 2.7–4.5)
PHOSPHATE SERPL-MCNC: 6.2 MG/DL (ref 2.7–4.5)
PHOSPHATE SERPL-MCNC: 6.2 MG/DL (ref 2.7–4.5)
PHOSPHATE SERPL-MCNC: 9.5 MG/DL (ref 2.7–4.5)
PIP: 29
PIP: 38
PIP: 41
PIP: 42
PISA TR MAX VEL: 2.21 M/S
PLATELET # BLD AUTO: 210 K/UL (ref 150–350)
PLATELET # BLD AUTO: 212 K/UL (ref 150–350)
PLATELET # BLD AUTO: 212 K/UL (ref 150–350)
PLATELET # BLD AUTO: 219 K/UL (ref 150–350)
PLATELET # BLD AUTO: 224 K/UL (ref 150–350)
PLATELET # BLD AUTO: 232 K/UL (ref 150–350)
PLATELET # BLD AUTO: 242 K/UL (ref 150–350)
PLATELET # BLD AUTO: 249 K/UL (ref 150–350)
PLATELET # BLD AUTO: 258 K/UL (ref 150–350)
PLATELET BLD QL SMEAR: ABNORMAL
PMV BLD AUTO: 10.6 FL (ref 9.2–12.9)
PMV BLD AUTO: 10.9 FL (ref 9.2–12.9)
PMV BLD AUTO: 11.1 FL (ref 9.2–12.9)
PMV BLD AUTO: 11.3 FL (ref 9.2–12.9)
PMV BLD AUTO: 11.3 FL (ref 9.2–12.9)
PMV BLD AUTO: 11.4 FL (ref 9.2–12.9)
PMV BLD AUTO: 11.4 FL (ref 9.2–12.9)
PMV BLD AUTO: 11.7 FL (ref 9.2–12.9)
PMV BLD AUTO: 11.8 FL (ref 9.2–12.9)
PO2 BLDA: 118 MMHG (ref 80–100)
PO2 BLDA: 140 MMHG (ref 80–100)
PO2 BLDA: 160 MMHG (ref 80–100)
PO2 BLDA: 270 MMHG (ref 80–100)
PO2 BLDA: 621 MMHG (ref 80–100)
PO2 BLDA: 68 MMHG (ref 80–100)
PO2 BLDA: 88 MMHG (ref 80–100)
PO2 BLDA: 90 MMHG (ref 80–100)
POC BE: -2 MMOL/L
POC BE: -3 MMOL/L
POC BE: -5 MMOL/L
POC BE: -5 MMOL/L
POC BE: -7 MMOL/L
POC BE: -8 MMOL/L
POC BE: -9 MMOL/L
POC BE: 2 MMOL/L
POC SATURATED O2: 100 % (ref 95–100)
POC SATURATED O2: 100 % (ref 95–100)
POC SATURATED O2: 93 % (ref 95–100)
POC SATURATED O2: 96 % (ref 95–100)
POC SATURATED O2: 97 % (ref 95–100)
POC SATURATED O2: 98 % (ref 95–100)
POC SATURATED O2: 98 % (ref 95–100)
POC SATURATED O2: 99 % (ref 95–100)
POC TCO2: 20 MMOL/L (ref 23–27)
POC TCO2: 22 MMOL/L (ref 23–27)
POC TCO2: 23 MMOL/L (ref 23–27)
POC TCO2: 25 MMOL/L (ref 23–27)
POC TCO2: 25 MMOL/L (ref 23–27)
POC TCO2: 26 MMOL/L (ref 23–27)
POC TCO2: 28 MMOL/L (ref 23–27)
POC TCO2: 28 MMOL/L (ref 23–27)
POCT GLUCOSE: 145 MG/DL (ref 70–110)
POCT GLUCOSE: 151 MG/DL (ref 70–110)
POCT GLUCOSE: 186 MG/DL (ref 70–110)
POCT GLUCOSE: 195 MG/DL (ref 70–110)
POCT GLUCOSE: 239 MG/DL (ref 70–110)
POCT GLUCOSE: 290 MG/DL (ref 70–110)
POCT GLUCOSE: 98 MG/DL (ref 70–110)
POTASSIUM SERPL-SCNC: 3.7 MMOL/L (ref 3.5–5.1)
POTASSIUM SERPL-SCNC: 3.9 MMOL/L (ref 3.5–5.1)
POTASSIUM SERPL-SCNC: 4 MMOL/L (ref 3.5–5.1)
POTASSIUM SERPL-SCNC: 4.2 MMOL/L (ref 3.5–5.1)
POTASSIUM SERPL-SCNC: 4.2 MMOL/L (ref 3.5–5.1)
POTASSIUM SERPL-SCNC: 4.3 MMOL/L (ref 3.5–5.1)
POTASSIUM SERPL-SCNC: 4.4 MMOL/L (ref 3.5–5.1)
POTASSIUM SERPL-SCNC: 4.5 MMOL/L (ref 3.5–5.1)
POTASSIUM SERPL-SCNC: 4.5 MMOL/L (ref 3.5–5.1)
POTASSIUM SERPL-SCNC: 4.6 MMOL/L (ref 3.5–5.1)
POTASSIUM SERPL-SCNC: 4.7 MMOL/L (ref 3.5–5.1)
POTASSIUM SERPL-SCNC: 4.8 MMOL/L (ref 3.5–5.1)
POTASSIUM SERPL-SCNC: 4.9 MMOL/L (ref 3.5–5.1)
POTASSIUM SERPL-SCNC: 5.2 MMOL/L (ref 3.5–5.1)
POTASSIUM SERPL-SCNC: 5.2 MMOL/L (ref 3.5–5.1)
POTASSIUM SERPL-SCNC: 5.6 MMOL/L (ref 3.5–5.1)
POTASSIUM SERPL-SCNC: 5.8 MMOL/L (ref 3.5–5.1)
POTASSIUM SERPL-SCNC: 7.2 MMOL/L (ref 3.5–5.1)
PROCALCITONIN SERPL IA-MCNC: 0.25 NG/ML
PROT SERPL-MCNC: 5.8 G/DL (ref 6–8.4)
PROT SERPL-MCNC: 5.8 G/DL (ref 6–8.4)
PROT SERPL-MCNC: 6.1 G/DL (ref 6–8.4)
PROT SERPL-MCNC: 6.2 G/DL (ref 6–8.4)
PROT SERPL-MCNC: 6.6 G/DL (ref 6–8.4)
PROT SERPL-MCNC: 6.6 G/DL (ref 6–8.4)
PROT SERPL-MCNC: 6.7 G/DL (ref 6–8.4)
PROT SERPL-MCNC: 6.8 G/DL (ref 6–8.4)
PROT SERPL-MCNC: 7 G/DL (ref 6–8.4)
PROT UR QL STRIP: ABNORMAL
PROTHROMBIN TIME: 10 SEC (ref 9–12.5)
PROTHROMBIN TIME: 10 SEC (ref 9–12.5)
PROTHROMBIN TIME: 10.1 SEC (ref 9–12.5)
PROTHROMBIN TIME: 10.7 SEC (ref 9–12.5)
RA PRESSURE: 8 MMHG
RBC # BLD AUTO: 4.3 M/UL (ref 4.6–6.2)
RBC # BLD AUTO: 4.32 M/UL (ref 4.6–6.2)
RBC # BLD AUTO: 4.43 M/UL (ref 4.6–6.2)
RBC # BLD AUTO: 4.45 M/UL (ref 4.6–6.2)
RBC # BLD AUTO: 4.45 M/UL (ref 4.6–6.2)
RBC # BLD AUTO: 4.54 M/UL (ref 4.6–6.2)
RBC # BLD AUTO: 4.69 M/UL (ref 4.6–6.2)
RBC # BLD AUTO: 4.85 M/UL (ref 4.6–6.2)
RBC # BLD AUTO: 5.04 M/UL (ref 4.6–6.2)
RBC #/AREA URNS HPF: 15 /HPF (ref 0–4)
RVP - ADENOVIRUS: NOT DETECTED
RVP - HUMAN METAPNEUMOVIRUS (HMPV): NOT DETECTED
RVP - INFLUENZA A: NOT DETECTED
RVP - INFLUENZA B: NOT DETECTED
RVP - RESPIRATORY SYNCTIAL VIRUS (RSV) A: NOT DETECTED
RVP - RESPIRATORY VIRAL PANEL, SOURCE: ABNORMAL
RVP - RHINOVIRUS: POSITIVE
SAMPLE: ABNORMAL
SITE: ABNORMAL
SMUDGE CELLS BLD QL SMEAR: PRESENT
SODIUM SERPL-SCNC: 138 MMOL/L (ref 136–145)
SODIUM SERPL-SCNC: 139 MMOL/L (ref 136–145)
SODIUM SERPL-SCNC: 139 MMOL/L (ref 136–145)
SODIUM SERPL-SCNC: 140 MMOL/L (ref 136–145)
SODIUM SERPL-SCNC: 140 MMOL/L (ref 136–145)
SODIUM SERPL-SCNC: 141 MMOL/L (ref 136–145)
SODIUM SERPL-SCNC: 142 MMOL/L (ref 136–145)
SODIUM SERPL-SCNC: 143 MMOL/L (ref 136–145)
SODIUM SERPL-SCNC: 144 MMOL/L (ref 136–145)
SODIUM SERPL-SCNC: 145 MMOL/L (ref 136–145)
SODIUM SERPL-SCNC: 146 MMOL/L (ref 136–145)
SODIUM SERPL-SCNC: 146 MMOL/L (ref 136–145)
SODIUM SERPL-SCNC: 147 MMOL/L (ref 136–145)
SODIUM SERPL-SCNC: 148 MMOL/L (ref 136–145)
SP GR UR STRIP: >=1.03 (ref 1–1.03)
SP02: 100
SP02: 90
SP02: 97
SP02: 98
TDI LATERAL: 0.05
TOXICOLOGY INFORMATION: NORMAL
TR MAX PG: 19.54 MMHG
TROPONIN I SERPL DL<=0.01 NG/ML-MCNC: 0.02 NG/ML (ref 0–0.03)
TROPONIN I SERPL DL<=0.01 NG/ML-MCNC: 0.02 NG/ML (ref 0–0.03)
TROPONIN I SERPL DL<=0.01 NG/ML-MCNC: <0.006 NG/ML (ref 0–0.03)
TROPONIN I SERPL DL<=0.01 NG/ML-MCNC: <0.006 NG/ML (ref 0–0.03)
TV REST PULMONARY ARTERY PRESSURE: 28 MMHG
URN SPEC COLLECT METH UR: ABNORMAL
UROBILINOGEN UR STRIP-ACNC: NEGATIVE EU/DL
VT: 450
VT: 500
VT: 520
WBC # BLD AUTO: 13.67 K/UL (ref 3.9–12.7)
WBC # BLD AUTO: 14.38 K/UL (ref 3.9–12.7)
WBC # BLD AUTO: 17.19 K/UL (ref 3.9–12.7)
WBC # BLD AUTO: 17.63 K/UL (ref 3.9–12.7)
WBC # BLD AUTO: 18.36 K/UL (ref 3.9–12.7)
WBC # BLD AUTO: 19.81 K/UL (ref 3.9–12.7)
WBC # BLD AUTO: 21.18 K/UL (ref 3.9–12.7)
WBC # BLD AUTO: 21.86 K/UL (ref 3.9–12.7)
WBC # BLD AUTO: 9.48 K/UL (ref 3.9–12.7)
WBC #/AREA URNS HPF: 10 /HPF (ref 0–5)
YEAST URNS QL MICRO: ABNORMAL

## 2019-01-01 PROCEDURE — 99900035 HC TECH TIME PER 15 MIN (STAT)

## 2019-01-01 PROCEDURE — 63600175 PHARM REV CODE 636 W HCPCS: Performed by: HOSPITALIST

## 2019-01-01 PROCEDURE — 25000003 PHARM REV CODE 250: Performed by: INTERNAL MEDICINE

## 2019-01-01 PROCEDURE — 80048 BASIC METABOLIC PNL TOTAL CA: CPT

## 2019-01-01 PROCEDURE — 83735 ASSAY OF MAGNESIUM: CPT | Mod: 91

## 2019-01-01 PROCEDURE — 82803 BLOOD GASES ANY COMBINATION: CPT

## 2019-01-01 PROCEDURE — 84100 ASSAY OF PHOSPHORUS: CPT

## 2019-01-01 PROCEDURE — 87185 SC STD ENZYME DETCJ PER NZM: CPT

## 2019-01-01 PROCEDURE — 94640 AIRWAY INHALATION TREATMENT: CPT

## 2019-01-01 PROCEDURE — 20000000 HC ICU ROOM

## 2019-01-01 PROCEDURE — 95822 EEG COMA OR SLEEP ONLY: CPT | Mod: 26,,, | Performed by: PSYCHIATRY & NEUROLOGY

## 2019-01-01 PROCEDURE — 25000003 PHARM REV CODE 250: Performed by: HOSPITALIST

## 2019-01-01 PROCEDURE — C9113 INJ PANTOPRAZOLE SODIUM, VIA: HCPCS | Performed by: HOSPITALIST

## 2019-01-01 PROCEDURE — 25000003 PHARM REV CODE 250: Performed by: NURSE PRACTITIONER

## 2019-01-01 PROCEDURE — 27200966 HC CLOSED SUCTION SYSTEM

## 2019-01-01 PROCEDURE — 94761 N-INVAS EAR/PLS OXIMETRY MLT: CPT

## 2019-01-01 PROCEDURE — 94002 VENT MGMT INPAT INIT DAY: CPT

## 2019-01-01 PROCEDURE — 87186 SC STD MICRODIL/AGAR DIL: CPT

## 2019-01-01 PROCEDURE — 99223 1ST HOSP IP/OBS HIGH 75: CPT | Mod: ,,, | Performed by: NURSE PRACTITIONER

## 2019-01-01 PROCEDURE — 84100 ASSAY OF PHOSPHORUS: CPT | Mod: 91

## 2019-01-01 PROCEDURE — 63600175 PHARM REV CODE 636 W HCPCS: Performed by: NURSE PRACTITIONER

## 2019-01-01 PROCEDURE — 83520 IMMUNOASSAY QUANT NOS NONAB: CPT

## 2019-01-01 PROCEDURE — 80076 HEPATIC FUNCTION PANEL: CPT

## 2019-01-01 PROCEDURE — 94644 CONT INHLJ TX 1ST HOUR: CPT

## 2019-01-01 PROCEDURE — 36415 COLL VENOUS BLD VENIPUNCTURE: CPT

## 2019-01-01 PROCEDURE — 99284 EMERGENCY DEPT VISIT MOD MDM: CPT | Mod: 25

## 2019-01-01 PROCEDURE — 63600175 PHARM REV CODE 636 W HCPCS: Performed by: INTERNAL MEDICINE

## 2019-01-01 PROCEDURE — 83605 ASSAY OF LACTIC ACID: CPT | Mod: 91

## 2019-01-01 PROCEDURE — 85025 COMPLETE CBC W/AUTO DIFF WBC: CPT

## 2019-01-01 PROCEDURE — 27000221 HC OXYGEN, UP TO 24 HOURS

## 2019-01-01 PROCEDURE — 93010 ELECTROCARDIOGRAM REPORT: CPT | Mod: 59,77,, | Performed by: INTERNAL MEDICINE

## 2019-01-01 PROCEDURE — 25000242 PHARM REV CODE 250 ALT 637 W/ HCPCS: Performed by: STUDENT IN AN ORGANIZED HEALTH CARE EDUCATION/TRAINING PROGRAM

## 2019-01-01 PROCEDURE — 99900026 HC AIRWAY MAINTENANCE (STAT)

## 2019-01-01 PROCEDURE — 80048 BASIC METABOLIC PNL TOTAL CA: CPT | Mod: 91

## 2019-01-01 PROCEDURE — 99223 PR INITIAL HOSPITAL CARE,LEVL III: ICD-10-PCS | Mod: ,,, | Performed by: NURSE PRACTITIONER

## 2019-01-01 PROCEDURE — 85018 HEMOGLOBIN: CPT

## 2019-01-01 PROCEDURE — 99233 PR SUBSEQUENT HOSPITAL CARE,LEVL III: ICD-10-PCS | Mod: ,,, | Performed by: NURSE PRACTITIONER

## 2019-01-01 PROCEDURE — 25000242 PHARM REV CODE 250 ALT 637 W/ HCPCS

## 2019-01-01 PROCEDURE — 80320 DRUG SCREEN QUANTALCOHOLS: CPT

## 2019-01-01 PROCEDURE — 95822 PR EEG,COMA/SLEEP RECORD ONLY: ICD-10-PCS | Mod: 26,,, | Performed by: PSYCHIATRY & NEUROLOGY

## 2019-01-01 PROCEDURE — 87205 SMEAR GRAM STAIN: CPT

## 2019-01-01 PROCEDURE — 85610 PROTHROMBIN TIME: CPT

## 2019-01-01 PROCEDURE — 94003 VENT MGMT INPAT SUBQ DAY: CPT

## 2019-01-01 PROCEDURE — 82550 ASSAY OF CK (CPK): CPT

## 2019-01-01 PROCEDURE — 51702 INSERT TEMP BLADDER CATH: CPT

## 2019-01-01 PROCEDURE — 87147 CULTURE TYPE IMMUNOLOGIC: CPT

## 2019-01-01 PROCEDURE — 36600 WITHDRAWAL OF ARTERIAL BLOOD: CPT

## 2019-01-01 PROCEDURE — 83880 ASSAY OF NATRIURETIC PEPTIDE: CPT | Mod: 91

## 2019-01-01 PROCEDURE — 83520 IMMUNOASSAY QUANT NOS NONAB: CPT | Mod: 59

## 2019-01-01 PROCEDURE — 25000242 PHARM REV CODE 250 ALT 637 W/ HCPCS: Performed by: NURSE PRACTITIONER

## 2019-01-01 PROCEDURE — S0028 INJECTION, FAMOTIDINE, 20 MG: HCPCS | Performed by: HOSPITALIST

## 2019-01-01 PROCEDURE — 96372 THER/PROPH/DIAG INJ SC/IM: CPT | Mod: 59

## 2019-01-01 PROCEDURE — 99199 UNLISTED SPECIAL SVC PX/RPRT: CPT

## 2019-01-01 PROCEDURE — 83735 ASSAY OF MAGNESIUM: CPT

## 2019-01-01 PROCEDURE — 83605 ASSAY OF LACTIC ACID: CPT

## 2019-01-01 PROCEDURE — 25000242 PHARM REV CODE 250 ALT 637 W/ HCPCS: Performed by: HOSPITALIST

## 2019-01-01 PROCEDURE — 10061 I&D ABSCESS COMP/MULTIPLE: CPT | Mod: 59

## 2019-01-01 PROCEDURE — 95822 EEG COMA OR SLEEP ONLY: CPT

## 2019-01-01 PROCEDURE — 25000003 PHARM REV CODE 250: Performed by: EMERGENCY MEDICINE

## 2019-01-01 PROCEDURE — 94645 CONT INHLJ TX EACH ADDL HOUR: CPT

## 2019-01-01 PROCEDURE — 93010 EKG 12-LEAD: ICD-10-PCS | Mod: 76,,, | Performed by: STUDENT IN AN ORGANIZED HEALTH CARE EDUCATION/TRAINING PROGRAM

## 2019-01-01 PROCEDURE — 84132 ASSAY OF SERUM POTASSIUM: CPT

## 2019-01-01 PROCEDURE — 25000003 PHARM REV CODE 250: Performed by: PHYSICIAN ASSISTANT

## 2019-01-01 PROCEDURE — 87632 RESP VIRUS 6-11 TARGETS: CPT

## 2019-01-01 PROCEDURE — 84145 PROCALCITONIN (PCT): CPT

## 2019-01-01 PROCEDURE — 84484 ASSAY OF TROPONIN QUANT: CPT | Mod: 91

## 2019-01-01 PROCEDURE — 93010 EKG 12-LEAD: ICD-10-PCS | Mod: 59,77,, | Performed by: INTERNAL MEDICINE

## 2019-01-01 PROCEDURE — 82962 GLUCOSE BLOOD TEST: CPT

## 2019-01-01 PROCEDURE — 93010 ELECTROCARDIOGRAM REPORT: CPT | Mod: ,,, | Performed by: INTERNAL MEDICINE

## 2019-01-01 PROCEDURE — 63600175 PHARM REV CODE 636 W HCPCS: Performed by: PHYSICIAN ASSISTANT

## 2019-01-01 PROCEDURE — 85730 THROMBOPLASTIN TIME PARTIAL: CPT

## 2019-01-01 PROCEDURE — 85027 COMPLETE CBC AUTOMATED: CPT

## 2019-01-01 PROCEDURE — 87070 CULTURE OTHR SPECIMN AEROBIC: CPT

## 2019-01-01 PROCEDURE — 27202364 HC PAD, COOLING, ANY SIZE

## 2019-01-01 PROCEDURE — 84484 ASSAY OF TROPONIN QUANT: CPT

## 2019-01-01 PROCEDURE — 93010 ELECTROCARDIOGRAM REPORT: CPT | Mod: ,,, | Performed by: STUDENT IN AN ORGANIZED HEALTH CARE EDUCATION/TRAINING PROGRAM

## 2019-01-01 PROCEDURE — 80307 DRUG TEST PRSMV CHEM ANLYZR: CPT

## 2019-01-01 PROCEDURE — 93005 ELECTROCARDIOGRAM TRACING: CPT

## 2019-01-01 PROCEDURE — 12000002 HC ACUTE/MED SURGE SEMI-PRIVATE ROOM

## 2019-01-01 PROCEDURE — 85007 BL SMEAR W/DIFF WBC COUNT: CPT

## 2019-01-01 PROCEDURE — 63600175 PHARM REV CODE 636 W HCPCS: Performed by: EMERGENCY MEDICINE

## 2019-01-01 PROCEDURE — 10060 I&D ABSCESS SIMPLE/SINGLE: CPT

## 2019-01-01 PROCEDURE — 80053 COMPREHEN METABOLIC PANEL: CPT | Mod: 91

## 2019-01-01 PROCEDURE — 87077 CULTURE AEROBIC IDENTIFY: CPT

## 2019-01-01 PROCEDURE — 87040 BLOOD CULTURE FOR BACTERIA: CPT

## 2019-01-01 PROCEDURE — 99233 SBSQ HOSP IP/OBS HIGH 50: CPT | Mod: ,,, | Performed by: NURSE PRACTITIONER

## 2019-01-01 PROCEDURE — 80074 ACUTE HEPATITIS PANEL: CPT

## 2019-01-01 PROCEDURE — 87077 CULTURE AEROBIC IDENTIFY: CPT | Mod: 59

## 2019-01-01 PROCEDURE — 93010 EKG 12-LEAD: ICD-10-PCS | Mod: ,,, | Performed by: INTERNAL MEDICINE

## 2019-01-01 PROCEDURE — 81000 URINALYSIS NONAUTO W/SCOPE: CPT | Mod: 59

## 2019-01-01 RX ORDER — LEVETIRACETAM 500 MG/1
1000 TABLET ORAL 2 TIMES DAILY
Status: DISCONTINUED | OUTPATIENT
Start: 2019-01-01 | End: 2019-01-01

## 2019-01-01 RX ORDER — SODIUM CHLORIDE 9 MG/ML
INJECTION, SOLUTION INTRAVENOUS CONTINUOUS
Status: DISCONTINUED | OUTPATIENT
Start: 2019-01-01 | End: 2019-01-01

## 2019-01-01 RX ORDER — DEXAMETHASONE SODIUM PHOSPHATE 100 MG/10ML
10 INJECTION INTRAMUSCULAR; INTRAVENOUS EVERY 6 HOURS
Status: DISCONTINUED | OUTPATIENT
Start: 2019-01-01 | End: 2019-01-01

## 2019-01-01 RX ORDER — ALBUTEROL SULFATE 2.5 MG/.5ML
SOLUTION RESPIRATORY (INHALATION)
Status: COMPLETED
Start: 2019-01-01 | End: 2019-01-01

## 2019-01-01 RX ORDER — AMLODIPINE BESYLATE 5 MG/1
10 TABLET ORAL DAILY
Status: DISCONTINUED | OUTPATIENT
Start: 2019-01-01 | End: 2019-01-01 | Stop reason: HOSPADM

## 2019-01-01 RX ORDER — FAMOTIDINE 20 MG/1
20 TABLET, FILM COATED ORAL EVERY 12 HOURS
Status: DISCONTINUED | OUTPATIENT
Start: 2019-01-01 | End: 2019-01-01

## 2019-01-01 RX ORDER — METHYLPREDNISOLONE SOD SUCC 125 MG
80 VIAL (EA) INJECTION EVERY 8 HOURS
Status: DISCONTINUED | OUTPATIENT
Start: 2019-01-01 | End: 2019-01-01

## 2019-01-01 RX ORDER — METOPROLOL TARTRATE 25 MG/1
25 TABLET, FILM COATED ORAL 2 TIMES DAILY
Status: DISCONTINUED | OUTPATIENT
Start: 2019-01-01 | End: 2019-01-01 | Stop reason: HOSPADM

## 2019-01-01 RX ORDER — CHLORHEXIDINE GLUCONATE ORAL RINSE 1.2 MG/ML
15 SOLUTION DENTAL 2 TIMES DAILY
Status: DISCONTINUED | OUTPATIENT
Start: 2019-01-01 | End: 2019-01-01 | Stop reason: HOSPADM

## 2019-01-01 RX ORDER — IPRATROPIUM BROMIDE 0.5 MG/2.5ML
SOLUTION RESPIRATORY (INHALATION)
Status: COMPLETED
Start: 2019-01-01 | End: 2019-01-01

## 2019-01-01 RX ORDER — IBUPROFEN 200 MG
16 TABLET ORAL
Status: DISCONTINUED | OUTPATIENT
Start: 2019-01-01 | End: 2019-01-01 | Stop reason: HOSPADM

## 2019-01-01 RX ORDER — LEVETIRACETAM 10 MG/ML
1000 INJECTION INTRAVASCULAR EVERY 12 HOURS
Status: DISCONTINUED | OUTPATIENT
Start: 2019-01-01 | End: 2019-01-01

## 2019-01-01 RX ORDER — ALBUTEROL SULFATE 2.5 MG/.5ML
2.5 SOLUTION RESPIRATORY (INHALATION) CONTINUOUS
Status: DISPENSED | OUTPATIENT
Start: 2019-01-01 | End: 2019-01-01

## 2019-01-01 RX ORDER — FLUTICASONE FUROATE AND VILANTEROL 200; 25 UG/1; UG/1
1 POWDER RESPIRATORY (INHALATION) DAILY
Status: DISCONTINUED | OUTPATIENT
Start: 2019-01-01 | End: 2019-01-01

## 2019-01-01 RX ORDER — IPRATROPIUM BROMIDE AND ALBUTEROL SULFATE 2.5; .5 MG/3ML; MG/3ML
3 SOLUTION RESPIRATORY (INHALATION) EVERY 4 HOURS
Status: DISCONTINUED | OUTPATIENT
Start: 2019-01-01 | End: 2019-01-01 | Stop reason: HOSPADM

## 2019-01-01 RX ORDER — PANTOPRAZOLE SODIUM 40 MG/10ML
40 INJECTION, POWDER, LYOPHILIZED, FOR SOLUTION INTRAVENOUS 2 TIMES DAILY
Status: DISCONTINUED | OUTPATIENT
Start: 2019-01-01 | End: 2019-01-01 | Stop reason: HOSPADM

## 2019-01-01 RX ORDER — ACETAMINOPHEN 650 MG/1
650 SUPPOSITORY RECTAL EVERY 6 HOURS PRN
Status: DISCONTINUED | OUTPATIENT
Start: 2019-01-01 | End: 2019-01-01 | Stop reason: HOSPADM

## 2019-01-01 RX ORDER — CLONIDINE HYDROCHLORIDE 0.1 MG/1
0.1 TABLET ORAL EVERY 4 HOURS PRN
Status: DISCONTINUED | OUTPATIENT
Start: 2019-01-01 | End: 2019-01-01 | Stop reason: HOSPADM

## 2019-01-01 RX ORDER — ONDANSETRON 2 MG/ML
4 INJECTION INTRAMUSCULAR; INTRAVENOUS EVERY 8 HOURS PRN
Status: DISCONTINUED | OUTPATIENT
Start: 2019-01-01 | End: 2019-01-01 | Stop reason: HOSPADM

## 2019-01-01 RX ORDER — IBUPROFEN 200 MG
24 TABLET ORAL
Status: DISCONTINUED | OUTPATIENT
Start: 2019-01-01 | End: 2019-01-01 | Stop reason: HOSPADM

## 2019-01-01 RX ORDER — BUSPIRONE HYDROCHLORIDE 5 MG/1
30 TABLET ORAL ONCE
Status: DISCONTINUED | OUTPATIENT
Start: 2019-01-01 | End: 2019-01-01

## 2019-01-01 RX ORDER — DEXTROSE 50 % IN WATER (D50W) INTRAVENOUS SYRINGE
25
Status: ACTIVE | OUTPATIENT
Start: 2019-01-01 | End: 2019-01-01

## 2019-01-01 RX ORDER — GLUCAGON 1 MG
1 KIT INJECTION
Status: DISCONTINUED | OUTPATIENT
Start: 2019-01-01 | End: 2019-01-01 | Stop reason: HOSPADM

## 2019-01-01 RX ORDER — MORPHINE SULFATE 2 MG/ML
5 INJECTION, SOLUTION INTRAMUSCULAR; INTRAVENOUS ONCE
Status: COMPLETED | OUTPATIENT
Start: 2019-01-01 | End: 2019-01-01

## 2019-01-01 RX ORDER — ALBUTEROL SULFATE 2.5 MG/.5ML
2.5 SOLUTION RESPIRATORY (INHALATION) EVERY 4 HOURS
Status: DISCONTINUED | OUTPATIENT
Start: 2019-01-01 | End: 2019-01-01

## 2019-01-01 RX ORDER — METOPROLOL TARTRATE 25 MG/1
25 TABLET, FILM COATED ORAL 2 TIMES DAILY
Status: DISCONTINUED | OUTPATIENT
Start: 2019-01-01 | End: 2019-01-01

## 2019-01-01 RX ORDER — ACETAMINOPHEN 325 MG/1
650 TABLET ORAL EVERY 6 HOURS PRN
Status: DISCONTINUED | OUTPATIENT
Start: 2019-01-01 | End: 2019-01-01

## 2019-01-01 RX ORDER — DEXTROSE 50 % IN WATER (D50W) INTRAVENOUS SYRINGE
25
Status: DISCONTINUED | OUTPATIENT
Start: 2019-01-01 | End: 2019-01-01 | Stop reason: HOSPADM

## 2019-01-01 RX ORDER — METOPROLOL TARTRATE 1 MG/ML
5 INJECTION, SOLUTION INTRAVENOUS EVERY 4 HOURS PRN
Status: DISCONTINUED | OUTPATIENT
Start: 2019-01-01 | End: 2019-01-01 | Stop reason: HOSPADM

## 2019-01-01 RX ORDER — MAGNESIUM SULFATE HEPTAHYDRATE 40 MG/ML
2 INJECTION, SOLUTION INTRAVENOUS
Status: COMPLETED | OUTPATIENT
Start: 2019-01-01 | End: 2019-01-01

## 2019-01-01 RX ORDER — ACETAMINOPHEN 650 MG/20.3ML
650 LIQUID ORAL EVERY 6 HOURS
Status: DISCONTINUED | OUTPATIENT
Start: 2019-01-01 | End: 2019-01-01

## 2019-01-01 RX ORDER — LORAZEPAM 2 MG/ML
INJECTION INTRAMUSCULAR
Status: COMPLETED
Start: 2019-01-01 | End: 2019-01-01

## 2019-01-01 RX ORDER — SCOLOPAMINE TRANSDERMAL SYSTEM 1 MG/1
1 PATCH, EXTENDED RELEASE TRANSDERMAL
Status: DISCONTINUED | OUTPATIENT
Start: 2019-01-01 | End: 2019-04-06 | Stop reason: HOSPADM

## 2019-01-01 RX ORDER — MORPHINE SULFATE 2 MG/ML
2 INJECTION, SOLUTION INTRAMUSCULAR; INTRAVENOUS EVERY 4 HOURS PRN
Status: DISCONTINUED | OUTPATIENT
Start: 2019-01-01 | End: 2019-01-01 | Stop reason: HOSPADM

## 2019-01-01 RX ORDER — CEPHALEXIN 500 MG/1
500 CAPSULE ORAL 4 TIMES DAILY
Qty: 40 CAPSULE | Refills: 0 | Status: SHIPPED | OUTPATIENT
Start: 2019-01-01 | End: 2019-01-01

## 2019-01-01 RX ORDER — SODIUM CHLORIDE 0.9 % (FLUSH) 0.9 %
10 SYRINGE (ML) INJECTION
Status: DISCONTINUED | OUTPATIENT
Start: 2019-01-01 | End: 2019-01-01 | Stop reason: HOSPADM

## 2019-01-01 RX ORDER — ASPIRIN 300 MG/1
300 SUPPOSITORY RECTAL
Status: COMPLETED | OUTPATIENT
Start: 2019-01-01 | End: 2019-01-01

## 2019-01-01 RX ORDER — ALBUTEROL SULFATE 2.5 MG/.5ML
7.5 SOLUTION RESPIRATORY (INHALATION) ONCE
Status: COMPLETED | OUTPATIENT
Start: 2019-01-01 | End: 2019-01-01

## 2019-01-01 RX ORDER — ALBUTEROL SULFATE 2.5 MG/.5ML
SOLUTION RESPIRATORY (INHALATION)
Status: DISPENSED
Start: 2019-01-01 | End: 2019-01-01

## 2019-01-01 RX ORDER — DEXTROSE MONOHYDRATE 50 MG/ML
INJECTION, SOLUTION INTRAVENOUS CONTINUOUS
Status: DISCONTINUED | OUTPATIENT
Start: 2019-01-01 | End: 2019-01-01

## 2019-01-01 RX ORDER — CLINDAMYCIN HYDROCHLORIDE 150 MG/1
300 CAPSULE ORAL 4 TIMES DAILY
Qty: 80 CAPSULE | Refills: 0 | Status: SHIPPED | OUTPATIENT
Start: 2019-01-01 | End: 2019-01-01

## 2019-01-01 RX ORDER — LIDOCAINE HYDROCHLORIDE 10 MG/ML
10 INJECTION INFILTRATION; PERINEURAL
Status: COMPLETED | OUTPATIENT
Start: 2019-01-01 | End: 2019-01-01

## 2019-01-01 RX ORDER — SODIUM CHLORIDE 450 MG/100ML
INJECTION, SOLUTION INTRAVENOUS CONTINUOUS
Status: ACTIVE | OUTPATIENT
Start: 2019-01-01 | End: 2019-01-01

## 2019-01-01 RX ORDER — NAPROXEN 500 MG/1
500 TABLET ORAL 2 TIMES DAILY WITH MEALS
Qty: 14 TABLET | Refills: 0 | Status: ON HOLD | OUTPATIENT
Start: 2019-01-01 | End: 2019-01-01 | Stop reason: HOSPADM

## 2019-01-01 RX ORDER — CEFAZOLIN SODIUM 1 G/3ML
1 INJECTION, POWDER, FOR SOLUTION INTRAMUSCULAR; INTRAVENOUS
Status: COMPLETED | OUTPATIENT
Start: 2019-01-01 | End: 2019-01-01

## 2019-01-01 RX ORDER — BUDESONIDE 0.5 MG/2ML
0.5 INHALANT ORAL EVERY 12 HOURS
Status: DISCONTINUED | OUTPATIENT
Start: 2019-01-01 | End: 2019-01-01 | Stop reason: HOSPADM

## 2019-01-01 RX ORDER — DEXMEDETOMIDINE HYDROCHLORIDE 4 UG/ML
0.2 INJECTION INTRAVENOUS CONTINUOUS
Status: DISCONTINUED | OUTPATIENT
Start: 2019-01-01 | End: 2019-01-01 | Stop reason: HOSPADM

## 2019-01-01 RX ORDER — ONDANSETRON 2 MG/ML
4 INJECTION INTRAMUSCULAR; INTRAVENOUS EVERY 6 HOURS PRN
Status: DISCONTINUED | OUTPATIENT
Start: 2019-01-01 | End: 2019-04-06 | Stop reason: HOSPADM

## 2019-01-01 RX ORDER — BACLOFEN 5 MG/1
5 TABLET ORAL 3 TIMES DAILY
Status: DISCONTINUED | OUTPATIENT
Start: 2019-01-01 | End: 2019-01-01 | Stop reason: HOSPADM

## 2019-01-01 RX ORDER — DEXTROSE 50 % IN WATER (D50W) INTRAVENOUS SYRINGE
12.5
Status: DISCONTINUED | OUTPATIENT
Start: 2019-01-01 | End: 2019-01-01 | Stop reason: HOSPADM

## 2019-01-01 RX ORDER — FAMOTIDINE 10 MG/ML
20 INJECTION INTRAVENOUS 2 TIMES DAILY
Status: DISCONTINUED | OUTPATIENT
Start: 2019-01-01 | End: 2019-01-01

## 2019-01-01 RX ORDER — ALBUTEROL SULFATE 2.5 MG/.5ML
10 SOLUTION RESPIRATORY (INHALATION) ONCE
Status: COMPLETED | OUTPATIENT
Start: 2019-01-01 | End: 2019-01-01

## 2019-01-01 RX ORDER — NOREPINEPHRINE BITARTRATE/D5W 16MG/250ML
0.02 PLASTIC BAG, INJECTION (ML) INTRAVENOUS CONTINUOUS
Status: DISCONTINUED | OUTPATIENT
Start: 2019-01-01 | End: 2019-01-01

## 2019-01-01 RX ORDER — ENOXAPARIN SODIUM 100 MG/ML
40 INJECTION SUBCUTANEOUS EVERY 24 HOURS
Status: DISCONTINUED | OUTPATIENT
Start: 2019-01-01 | End: 2019-01-01

## 2019-01-01 RX ORDER — FLUTICASONE FUROATE AND VILANTEROL 100; 25 UG/1; UG/1
1 POWDER RESPIRATORY (INHALATION) DAILY
Status: DISCONTINUED | OUTPATIENT
Start: 2019-01-01 | End: 2019-01-01

## 2019-01-01 RX ORDER — LEVETIRACETAM 15 MG/ML
1500 INJECTION INTRAVASCULAR ONCE
Status: COMPLETED | OUTPATIENT
Start: 2019-01-01 | End: 2019-01-01

## 2019-01-01 RX ORDER — PROPOFOL 10 MG/ML
5 INJECTION, EMULSION INTRAVENOUS CONTINUOUS
Status: DISCONTINUED | OUTPATIENT
Start: 2019-01-01 | End: 2019-01-01

## 2019-01-01 RX ORDER — DEXTROSE 50 % IN WATER (D50W) INTRAVENOUS SYRINGE
25 ONCE
Status: COMPLETED | OUTPATIENT
Start: 2019-01-01 | End: 2019-01-01

## 2019-01-01 RX ORDER — METHYLPREDNISOLONE SOD SUCC 125 MG
40 VIAL (EA) INJECTION DAILY
Status: DISCONTINUED | OUTPATIENT
Start: 2019-01-01 | End: 2019-01-01

## 2019-01-01 RX ORDER — HYDRALAZINE HYDROCHLORIDE 20 MG/ML
10 INJECTION INTRAMUSCULAR; INTRAVENOUS EVERY 6 HOURS PRN
Status: DISCONTINUED | OUTPATIENT
Start: 2019-01-01 | End: 2019-01-01 | Stop reason: HOSPADM

## 2019-01-01 RX ORDER — TIOTROPIUM BROMIDE 18 UG/1
1 CAPSULE ORAL; RESPIRATORY (INHALATION) DAILY
Status: DISCONTINUED | OUTPATIENT
Start: 2019-01-01 | End: 2019-01-01

## 2019-01-01 RX ORDER — ENOXAPARIN SODIUM 100 MG/ML
40 INJECTION SUBCUTANEOUS EVERY 24 HOURS
Status: DISCONTINUED | OUTPATIENT
Start: 2019-01-01 | End: 2019-01-01 | Stop reason: HOSPADM

## 2019-01-01 RX ADMIN — LORAZEPAM 3 MG: 2 INJECTION INTRAMUSCULAR; INTRAVENOUS at 04:04

## 2019-01-01 RX ADMIN — IPRATROPIUM BROMIDE AND ALBUTEROL SULFATE 3 ML: .5; 3 SOLUTION RESPIRATORY (INHALATION) at 11:04

## 2019-01-01 RX ADMIN — IPRATROPIUM BROMIDE AND ALBUTEROL SULFATE 3 ML: .5; 3 SOLUTION RESPIRATORY (INHALATION) at 07:03

## 2019-01-01 RX ADMIN — CHLORHEXIDINE GLUCONATE 15 ML: 1.2 RINSE ORAL at 09:03

## 2019-01-01 RX ADMIN — SCOPALAMINE 1 PATCH: 1 PATCH, EXTENDED RELEASE TRANSDERMAL at 05:04

## 2019-01-01 RX ADMIN — CHLORHEXIDINE GLUCONATE 15 ML: 1.2 RINSE ORAL at 08:04

## 2019-01-01 RX ADMIN — IPRATROPIUM BROMIDE AND ALBUTEROL SULFATE 3 ML: .5; 3 SOLUTION RESPIRATORY (INHALATION) at 03:04

## 2019-01-01 RX ADMIN — DEXAMETHASONE SODIUM PHOSPHATE 10 MG: 10 INJECTION INTRAMUSCULAR; INTRAVENOUS at 01:04

## 2019-01-01 RX ADMIN — CHLORHEXIDINE GLUCONATE 15 ML: 1.2 RINSE ORAL at 08:03

## 2019-01-01 RX ADMIN — AMLODIPINE BESYLATE 10 MG: 5 TABLET ORAL at 08:04

## 2019-01-01 RX ADMIN — FAMOTIDINE 20 MG: 10 INJECTION, SOLUTION INTRAVENOUS at 11:03

## 2019-01-01 RX ADMIN — DEXAMETHASONE SODIUM PHOSPHATE 10 MG: 10 INJECTION INTRAMUSCULAR; INTRAVENOUS at 05:04

## 2019-01-01 RX ADMIN — PANTOPRAZOLE SODIUM 40 MG: 40 INJECTION, POWDER, LYOPHILIZED, FOR SOLUTION INTRAVENOUS at 08:04

## 2019-01-01 RX ADMIN — PROPOFOL 30 MCG/KG/MIN: 10 INJECTION, EMULSION INTRAVENOUS at 07:03

## 2019-01-01 RX ADMIN — METHYLPREDNISOLONE SODIUM SUCCINATE 40 MG: 125 INJECTION, POWDER, FOR SOLUTION INTRAMUSCULAR; INTRAVENOUS at 08:04

## 2019-01-01 RX ADMIN — IPRATROPIUM BROMIDE AND ALBUTEROL SULFATE 3 ML: .5; 3 SOLUTION RESPIRATORY (INHALATION) at 07:04

## 2019-01-01 RX ADMIN — PROPOFOL 30 MCG/KG/MIN: 10 INJECTION, EMULSION INTRAVENOUS at 02:03

## 2019-01-01 RX ADMIN — PROPOFOL 10 MCG/KG/MIN: 10 INJECTION, EMULSION INTRAVENOUS at 08:03

## 2019-01-01 RX ADMIN — METOPROLOL TARTRATE 5 MG: 1 INJECTION, SOLUTION INTRAVENOUS at 10:04

## 2019-01-01 RX ADMIN — SODIUM CHLORIDE 500 ML: 0.9 INJECTION, SOLUTION INTRAVENOUS at 02:03

## 2019-01-01 RX ADMIN — CHLORHEXIDINE GLUCONATE 15 ML: 1.2 RINSE ORAL at 09:04

## 2019-01-01 RX ADMIN — PROPOFOL 5 MCG/KG/MIN: 10 INJECTION, EMULSION INTRAVENOUS at 09:03

## 2019-01-01 RX ADMIN — METOPROLOL TARTRATE 5 MG: 1 INJECTION, SOLUTION INTRAVENOUS at 03:04

## 2019-01-01 RX ADMIN — LEVETIRACETAM INJECTION 1000 MG: 10 INJECTION INTRAVENOUS at 08:03

## 2019-01-01 RX ADMIN — HYPROMELLOSE 2910 1 DROP: 5 SOLUTION OPHTHALMIC at 03:03

## 2019-01-01 RX ADMIN — HYDRALAZINE HYDROCHLORIDE 10 MG: 20 INJECTION INTRAMUSCULAR; INTRAVENOUS at 08:04

## 2019-01-01 RX ADMIN — PANTOPRAZOLE SODIUM 40 MG: 40 INJECTION, POWDER, LYOPHILIZED, FOR SOLUTION INTRAVENOUS at 09:04

## 2019-01-01 RX ADMIN — DEXAMETHASONE SODIUM PHOSPHATE 10 MG: 10 INJECTION INTRAMUSCULAR; INTRAVENOUS at 12:04

## 2019-01-01 RX ADMIN — SODIUM CHLORIDE: 0.45 INJECTION, SOLUTION INTRAVENOUS at 04:04

## 2019-01-01 RX ADMIN — BUDESONIDE 0.5 MG: 0.5 SUSPENSION RESPIRATORY (INHALATION) at 07:03

## 2019-01-01 RX ADMIN — IPRATROPIUM BROMIDE AND ALBUTEROL SULFATE 3 ML: .5; 3 SOLUTION RESPIRATORY (INHALATION) at 03:03

## 2019-01-01 RX ADMIN — MAGNESIUM SULFATE IN WATER 2 G: 40 INJECTION, SOLUTION INTRAVENOUS at 09:03

## 2019-01-01 RX ADMIN — ENOXAPARIN SODIUM 40 MG: 100 INJECTION SUBCUTANEOUS at 05:04

## 2019-01-01 RX ADMIN — SODIUM CHLORIDE: 0.9 INJECTION, SOLUTION INTRAVENOUS at 07:03

## 2019-01-01 RX ADMIN — ALBUTEROL SULFATE 2.5 MG: 2.5 SOLUTION RESPIRATORY (INHALATION) at 04:03

## 2019-01-01 RX ADMIN — METHYLPREDNISOLONE SODIUM SUCCINATE 80 MG: 125 INJECTION, POWDER, FOR SOLUTION INTRAMUSCULAR; INTRAVENOUS at 05:03

## 2019-01-01 RX ADMIN — MORPHINE SULFATE 5 MG: 2 INJECTION, SOLUTION INTRAMUSCULAR; INTRAVENOUS at 04:04

## 2019-01-01 RX ADMIN — IPRATROPIUM BROMIDE AND ALBUTEROL SULFATE 3 ML: .5; 3 SOLUTION RESPIRATORY (INHALATION) at 11:03

## 2019-01-01 RX ADMIN — CEFAZOLIN 1 G: 1 INJECTION, POWDER, FOR SOLUTION INTRAMUSCULAR; INTRAVENOUS at 08:03

## 2019-01-01 RX ADMIN — PROPOFOL 30 MCG/KG/MIN: 10 INJECTION, EMULSION INTRAVENOUS at 08:03

## 2019-01-01 RX ADMIN — ALBUTEROL SULFATE 15 MG: 2.5 SOLUTION RESPIRATORY (INHALATION) at 07:03

## 2019-01-01 RX ADMIN — BUDESONIDE 0.5 MG: 0.5 SUSPENSION RESPIRATORY (INHALATION) at 07:04

## 2019-01-01 RX ADMIN — AMLODIPINE BESYLATE 10 MG: 5 TABLET ORAL at 09:04

## 2019-01-01 RX ADMIN — SODIUM POLYSTYRENE SULFONATE 15 G: 15 SUSPENSION ORAL; RECTAL at 09:04

## 2019-01-01 RX ADMIN — FAMOTIDINE 20 MG: 10 INJECTION, SOLUTION INTRAVENOUS at 08:03

## 2019-01-01 RX ADMIN — PANTOPRAZOLE SODIUM 40 MG: 40 INJECTION, POWDER, LYOPHILIZED, FOR SOLUTION INTRAVENOUS at 11:03

## 2019-01-01 RX ADMIN — HYDRALAZINE HYDROCHLORIDE 10 MG: 20 INJECTION INTRAMUSCULAR; INTRAVENOUS at 10:03

## 2019-01-01 RX ADMIN — IPRATROPIUM BROMIDE AND ALBUTEROL SULFATE 3 ML: .5; 3 SOLUTION RESPIRATORY (INHALATION) at 06:03

## 2019-01-01 RX ADMIN — BACLOFEN 5 MG: 5 TABLET ORAL at 08:04

## 2019-01-01 RX ADMIN — LEVETIRACETAM INJECTION 1000 MG: 10 INJECTION INTRAVENOUS at 08:04

## 2019-01-01 RX ADMIN — SODIUM CHLORIDE: 0.9 INJECTION, SOLUTION INTRAVENOUS at 06:04

## 2019-01-01 RX ADMIN — DEXMEDETOMIDINE HYDROCHLORIDE 0.6 MCG/KG/HR: 4 INJECTION INTRAVENOUS at 02:04

## 2019-01-01 RX ADMIN — IPRATROPIUM BROMIDE AND ALBUTEROL SULFATE 3 ML: .5; 3 SOLUTION RESPIRATORY (INHALATION) at 04:03

## 2019-01-01 RX ADMIN — ALBUTEROL SULFATE 10 MG: 2.5 SOLUTION RESPIRATORY (INHALATION) at 10:03

## 2019-01-01 RX ADMIN — SODIUM CHLORIDE 500 ML: 0.9 INJECTION, SOLUTION INTRAVENOUS at 10:03

## 2019-01-01 RX ADMIN — LEVETIRACETAM INJECTION 1000 MG: 10 INJECTION INTRAVENOUS at 09:03

## 2019-01-01 RX ADMIN — ALBUTEROL SULFATE 15 MG: 2.5 SOLUTION RESPIRATORY (INHALATION) at 06:03

## 2019-01-01 RX ADMIN — METHYLPREDNISOLONE SODIUM SUCCINATE 80 MG: 125 INJECTION, POWDER, FOR SOLUTION INTRAMUSCULAR; INTRAVENOUS at 11:03

## 2019-01-01 RX ADMIN — Medication: at 08:03

## 2019-01-01 RX ADMIN — PROPOFOL 5 MCG/KG/MIN: 10 INJECTION, EMULSION INTRAVENOUS at 02:03

## 2019-01-01 RX ADMIN — Medication 0.02 MCG/KG/MIN: at 08:03

## 2019-01-01 RX ADMIN — DEXTROSE 1810 MG: 50 INJECTION, SOLUTION INTRAVENOUS at 10:03

## 2019-01-01 RX ADMIN — LORAZEPAM 3 MG: 2 INJECTION INTRAMUSCULAR; INTRAVENOUS at 06:04

## 2019-01-01 RX ADMIN — INSULIN HUMAN 10 UNITS: 100 INJECTION, SOLUTION PARENTERAL at 07:04

## 2019-01-01 RX ADMIN — METOPROLOL TARTRATE 5 MG: 1 INJECTION, SOLUTION INTRAVENOUS at 08:04

## 2019-01-01 RX ADMIN — IPRATROPIUM BROMIDE AND ALBUTEROL SULFATE 3 ML: .5; 3 SOLUTION RESPIRATORY (INHALATION) at 04:04

## 2019-01-01 RX ADMIN — MAGNESIUM SULFATE IN WATER 2 G: 40 INJECTION, SOLUTION INTRAVENOUS at 06:03

## 2019-01-01 RX ADMIN — PROPOFOL 30 MCG/KG/MIN: 10 INJECTION, EMULSION INTRAVENOUS at 07:04

## 2019-01-01 RX ADMIN — CALCIUM GLUCONATE 1000 MG: 98 INJECTION, SOLUTION INTRAVENOUS at 07:04

## 2019-01-01 RX ADMIN — DEXTROSE MONOHYDRATE 25 G: 25 INJECTION, SOLUTION INTRAVENOUS at 11:03

## 2019-01-01 RX ADMIN — METOPROLOL TARTRATE 25 MG: 25 TABLET ORAL at 08:04

## 2019-01-01 RX ADMIN — IPRATROPIUM BROMIDE AND ALBUTEROL SULFATE 3 ML: .5; 3 SOLUTION RESPIRATORY (INHALATION) at 08:04

## 2019-01-01 RX ADMIN — MAGNESIUM SULFATE IN WATER 2 G: 40 INJECTION, SOLUTION INTRAVENOUS at 10:03

## 2019-01-01 RX ADMIN — DEXMEDETOMIDINE HYDROCHLORIDE 0.5 MCG/KG/HR: 4 INJECTION INTRAVENOUS at 09:04

## 2019-01-01 RX ADMIN — METHYLPREDNISOLONE SODIUM SUCCINATE 80 MG: 125 INJECTION, POWDER, FOR SOLUTION INTRAMUSCULAR; INTRAVENOUS at 01:03

## 2019-01-01 RX ADMIN — DEXMEDETOMIDINE HYDROCHLORIDE 0.6 MCG/KG/HR: 4 INJECTION INTRAVENOUS at 05:04

## 2019-01-01 RX ADMIN — BUDESONIDE 0.5 MG: 0.5 SUSPENSION RESPIRATORY (INHALATION) at 06:03

## 2019-01-01 RX ADMIN — HYDRALAZINE HYDROCHLORIDE 10 MG: 20 INJECTION INTRAMUSCULAR; INTRAVENOUS at 11:04

## 2019-01-01 RX ADMIN — METHYLPREDNISOLONE SODIUM SUCCINATE 80 MG: 125 INJECTION, POWDER, FOR SOLUTION INTRAMUSCULAR; INTRAVENOUS at 04:03

## 2019-01-01 RX ADMIN — ALBUTEROL SULFATE 10 MG: 2.5 SOLUTION RESPIRATORY (INHALATION) at 09:03

## 2019-01-01 RX ADMIN — SODIUM CHLORIDE: 0.9 INJECTION, SOLUTION INTRAVENOUS at 05:03

## 2019-01-01 RX ADMIN — DEXAMETHASONE SODIUM PHOSPHATE 10 MG: 10 INJECTION INTRAMUSCULAR; INTRAVENOUS at 06:04

## 2019-01-01 RX ADMIN — AMLODIPINE BESYLATE 10 MG: 5 TABLET ORAL at 03:04

## 2019-01-01 RX ADMIN — DEXMEDETOMIDINE HYDROCHLORIDE 0.6 MCG/KG/HR: 4 INJECTION INTRAVENOUS at 10:04

## 2019-01-01 RX ADMIN — HYDRALAZINE HYDROCHLORIDE 10 MG: 20 INJECTION INTRAMUSCULAR; INTRAVENOUS at 12:04

## 2019-01-01 RX ADMIN — BACLOFEN 5 MG: 5 TABLET ORAL at 05:04

## 2019-01-01 RX ADMIN — SODIUM CHLORIDE: 0.9 INJECTION, SOLUTION INTRAVENOUS at 10:04

## 2019-01-01 RX ADMIN — ACETAMINOPHEN 650 MG: 650 SUPPOSITORY RECTAL at 01:04

## 2019-01-01 RX ADMIN — MORPHINE SULFATE 2 MG: 2 INJECTION, SOLUTION INTRAMUSCULAR; INTRAVENOUS at 10:03

## 2019-01-01 RX ADMIN — BUDESONIDE 0.5 MG: 0.5 SUSPENSION RESPIRATORY (INHALATION) at 11:03

## 2019-01-01 RX ADMIN — DEXMEDETOMIDINE HYDROCHLORIDE 0.5 MCG/KG/HR: 4 INJECTION INTRAVENOUS at 02:04

## 2019-01-01 RX ADMIN — METHYLPREDNISOLONE SODIUM SUCCINATE 80 MG: 125 INJECTION, POWDER, FOR SOLUTION INTRAMUSCULAR; INTRAVENOUS at 03:03

## 2019-01-01 RX ADMIN — SODIUM CHLORIDE: 0.45 INJECTION, SOLUTION INTRAVENOUS at 08:04

## 2019-01-01 RX ADMIN — DEXTROSE MONOHYDRATE 25 G: 25 INJECTION, SOLUTION INTRAVENOUS at 07:04

## 2019-01-01 RX ADMIN — BUDESONIDE 0.5 MG: 0.5 SUSPENSION RESPIRATORY (INHALATION) at 08:04

## 2019-01-01 RX ADMIN — LEVETIRACETAM INJECTION 1000 MG: 10 INJECTION INTRAVENOUS at 09:04

## 2019-01-01 RX ADMIN — LEVETIRACETAM INJECTION 1500 MG: 15 INJECTION INTRAVENOUS at 09:03

## 2019-01-01 RX ADMIN — PANTOPRAZOLE SODIUM 40 MG: 40 INJECTION, POWDER, LYOPHILIZED, FOR SOLUTION INTRAVENOUS at 08:03

## 2019-01-01 RX ADMIN — HYDRALAZINE HYDROCHLORIDE 10 MG: 20 INJECTION INTRAMUSCULAR; INTRAVENOUS at 07:03

## 2019-01-01 RX ADMIN — ASPIRIN 300 MG: 300 SUPPOSITORY RECTAL at 08:03

## 2019-01-01 RX ADMIN — SODIUM CHLORIDE: 0.9 INJECTION, SOLUTION INTRAVENOUS at 10:03

## 2019-01-01 RX ADMIN — BUDESONIDE 0.5 MG: 0.5 SUSPENSION RESPIRATORY (INHALATION) at 11:04

## 2019-01-01 RX ADMIN — PANTOPRAZOLE SODIUM 40 MG: 40 INJECTION, POWDER, LYOPHILIZED, FOR SOLUTION INTRAVENOUS at 09:03

## 2019-01-01 RX ADMIN — ALBUTEROL SULFATE 2.5 MG: 2.5 SOLUTION RESPIRATORY (INHALATION) at 06:04

## 2019-01-01 RX ADMIN — ALBUTEROL SULFATE 2.5 MG: 2.5 SOLUTION RESPIRATORY (INHALATION) at 07:03

## 2019-01-01 RX ADMIN — HYDRALAZINE HYDROCHLORIDE 10 MG: 20 INJECTION INTRAMUSCULAR; INTRAVENOUS at 10:04

## 2019-01-01 RX ADMIN — METHYLPREDNISOLONE SODIUM SUCCINATE 80 MG: 125 INJECTION, POWDER, FOR SOLUTION INTRAMUSCULAR; INTRAVENOUS at 06:03

## 2019-01-01 RX ADMIN — DEXMEDETOMIDINE HYDROCHLORIDE 0.2 MCG/KG/HR: 4 INJECTION INTRAVENOUS at 09:04

## 2019-01-01 RX ADMIN — SODIUM CHLORIDE: 0.9 INJECTION, SOLUTION INTRAVENOUS at 11:03

## 2019-01-01 RX ADMIN — SODIUM CHLORIDE 1000 ML: 0.9 INJECTION, SOLUTION INTRAVENOUS at 05:03

## 2019-01-01 RX ADMIN — METHYLPREDNISOLONE SODIUM SUCCINATE 80 MG: 125 INJECTION, POWDER, FOR SOLUTION INTRAMUSCULAR; INTRAVENOUS at 09:03

## 2019-01-01 RX ADMIN — METHYLPREDNISOLONE SODIUM SUCCINATE 80 MG: 125 INJECTION, POWDER, FOR SOLUTION INTRAMUSCULAR; INTRAVENOUS at 06:04

## 2019-01-01 RX ADMIN — MORPHINE SULFATE 4 MG/HR: 10 INJECTION INTRAVENOUS at 05:04

## 2019-01-01 RX ADMIN — ENOXAPARIN SODIUM 40 MG: 100 INJECTION SUBCUTANEOUS at 11:03

## 2019-01-01 RX ADMIN — PROPOFOL 30 MCG/KG/MIN: 10 INJECTION, EMULSION INTRAVENOUS at 01:03

## 2019-01-01 RX ADMIN — IPRATROPIUM BROMIDE 0.5 MG: 0.5 SOLUTION RESPIRATORY (INHALATION) at 06:03

## 2019-01-01 RX ADMIN — IPRATROPIUM BROMIDE 1 MG: 0.5 SOLUTION RESPIRATORY (INHALATION) at 07:03

## 2019-01-01 RX ADMIN — ALBUTEROL SULFATE 7.5 MG: 2.5 SOLUTION RESPIRATORY (INHALATION) at 07:04

## 2019-01-01 RX ADMIN — ENOXAPARIN SODIUM 40 MG: 100 INJECTION SUBCUTANEOUS at 04:03

## 2019-01-01 RX ADMIN — SODIUM POLYSTYRENE SULFONATE 45 G: 15 SUSPENSION ORAL; RECTAL at 06:04

## 2019-01-01 RX ADMIN — ALBUTEROL SULFATE 2.5 MG: 2.5 SOLUTION RESPIRATORY (INHALATION) at 11:03

## 2019-01-01 RX ADMIN — LIDOCAINE HYDROCHLORIDE 10 ML: 10 INJECTION, SOLUTION INFILTRATION; PERINEURAL at 08:03

## 2019-01-01 RX ADMIN — PROPOFOL 30 MCG/KG/MIN: 10 INJECTION, EMULSION INTRAVENOUS at 12:03

## 2019-01-01 RX ADMIN — PROPOFOL 30 MCG/KG/MIN: 10 INJECTION, EMULSION INTRAVENOUS at 12:04

## 2019-01-01 RX ADMIN — BACLOFEN 5 MG: 5 TABLET ORAL at 02:04

## 2019-01-01 RX ADMIN — PROPOFOL 5 MCG/KG/MIN: 10 INJECTION, EMULSION INTRAVENOUS at 07:03

## 2019-01-01 RX ADMIN — LORAZEPAM 2 MG: 2 INJECTION INTRAMUSCULAR; INTRAVENOUS at 08:03

## 2019-01-01 RX ADMIN — FAMOTIDINE 20 MG: 10 INJECTION, SOLUTION INTRAVENOUS at 09:03

## 2019-01-01 RX ADMIN — INSULIN HUMAN 9.07 UNITS: 100 INJECTION, SOLUTION PARENTERAL at 11:03

## 2019-01-01 NOTE — ED PROVIDER NOTES
Encounter Date: 12/31/2018       History     Chief Complaint   Patient presents with    Sore Throat     began yesterday; recent sick contacts of strep throat;      Patient is a 63-year-old male with past medical history of COPD and hypertension presents to ED for evaluation of sore throat since yesterday.  Spouse reports that her child has recently been diagnosed with strep throat.  Spouse reports that she gave patient amoxicillin that she had at the house yesterday and reports giving patient 1 dose yesterday and 1 dose today.  Patient associates chills and difficulty swallowing.  Denies difficulty tolerating secretions.  Patient reports that he does have a cough but that he always has a cough due to COPD.  Patient denies any any alleviating or exacerbating factors.  Patient denies fever, neck pain/stiffness, SOB, chest pain, nausea, vomiting, and diarrhea.  Denies any other complaints at this time.      The history is provided by the patient and the spouse.     Review of patient's allergies indicates:  No Known Allergies  Past Medical History:   Diagnosis Date    COPD (chronic obstructive pulmonary disease)     Hypertension     Reactive airway disease 12/2/2014     History reviewed. No pertinent surgical history.  Family History   Problem Relation Age of Onset    Heart disease Mother     Heart disease Father      Social History     Tobacco Use    Smoking status: Current Every Day Smoker     Packs/day: 1.00     Years: 40.00     Pack years: 40.00     Types: Cigarettes    Smokeless tobacco: Never Used   Substance Use Topics    Alcohol use: No    Drug use: No     Review of Systems   Constitutional: Negative for chills and fever.   HENT: Positive for sore throat and trouble swallowing. Negative for congestion, ear discharge, ear pain, facial swelling, nosebleeds, postnasal drip, rhinorrhea, sinus pain and voice change.    Respiratory: Positive for cough (always has a cough due to COPD).    Cardiovascular:  Negative for chest pain.   Gastrointestinal: Negative for diarrhea, nausea and vomiting.   Musculoskeletal: Negative for back pain, neck pain and neck stiffness.   Skin: Negative for rash.   All other systems reviewed and are negative.      Physical Exam     Initial Vitals [12/31/18 1943]   BP Pulse Resp Temp SpO2   (!) 140/88 (!) 115 16 100 °F (37.8 °C) 98 %      MAP       --         Physical Exam    Vitals reviewed.  Constitutional: He appears well-developed and well-nourished. He is not diaphoretic. He is active and cooperative.  Non-toxic appearance. He does not have a sickly appearance.   No acute distress.   HENT:   Head: Atraumatic.   Nose: Mucosal edema present.   MM moist.  Oropharynx with moderate erythema, edema, and exudate.  Uvula midline.  No oral lesions.  No Wilfredo's or trismus.  Tolerating secretions.   Eyes: EOM are normal.   Neck: Trachea normal, normal range of motion, full passive range of motion without pain and phonation normal. Neck supple.   Cardiovascular: Regular rhythm and normal pulses. Tachycardia present.    Pulmonary/Chest:   Lungs CTA.  No respiratory distress.   Lymphadenopathy:     He has cervical adenopathy.   Neurological: He is alert and oriented to person, place, and time.   Skin: Skin is warm, dry and intact. Capillary refill takes less than 2 seconds. No rash noted.   Psychiatric: He has a normal mood and affect.         ED Course   Procedures  Labs Reviewed   THROAT SCREEN, RAPID          Imaging Results    None          Medical Decision Making:   History:   I obtained history from: someone other than patient.       <> Summary of History: Spouse  Old Medical Records: I decided to obtain old medical records.  Initial Assessment:   Patient presents ED for evaluation sore throat since yesterday.  Associates chills and difficulty swallowing.  Appears well, nontoxic.  Afebrile.  Mucosal edema present.  Mm moist.  Oropharynx with moderate erythema, edema, and exudate.  Uvula  midline.  No Wilfredo's or trismus.  Tolerating secretions.  No respiratory distress.  Clinical Tests:   Lab Tests: Reviewed and Ordered  ED Management:  Strep swab, IM decadron, IM bicillin   Other:   I discussed test(s) with the performing physician.       <> Summary of the Findings: Care this patient discussed with Dr. Tirado who agrees with the ED course and disposition.  Although strep test was negative, patient is symptomatic and treated for strep pharyngitis in ED with IM Bicillin. Patient is hemodynamically stable will be DC home. Patient instructed to throw away toothbrush within 24 hr, increase oral intake and get plenty of rest, and take over-the-counter ibuprofen or Tylenol as labeled as needed for pain or fever. Instructed to follow up with daughters of [facility] in 1-2 days return to ED for any concerns or worsening symptom.  Patient verbalized understanding, compliance, and agreement with treatment plan.              Attending Attestation:     Physician Attestation Statement for NP/PA:   I have conducted a face to face encounter with this patient in addition to the NP/PA, due to NP/PA Request    Other NP/PA Attestation Additions:      Medical Decision Making: Patient is a 63-year-old male who presents today complaining of a sore throat. Vital signs stable with the exception of mild tachycardia, patient is in no acute distress, is nontoxic-appearing.  There is posterior pharyngeal erythema with exudates on exam.  There is no posterior pharyngeal edema uvula is midline. No stridor no respiratory distress whatsoever.  Will treat patient for strep pharyngitis given his exam.  Patient also given Decadron injection.    Upon re-evaluation, the patient's status has improved.  After complete ED evaluation, clinical impression is most consistent with pharyngitis.  PCP follow-up within 1 week was recommended.    After taking into careful account the patient's history, physical exam findings, as well as  empirical and objective data obtained throughout ED workup, I feel no emergent medical condition has been identified. No further evaluation or admission was felt to be required, and the patient is stable for discharge from the ED. The patient and any additional family present were updated with test results, overall clinical impression, and recommended further plan of care, including discharge instructions as provided and outpatient follow-up for continued evaluation and management as needed. All questions were answered. The patient expressed understanding and agreed with current plan for discharge and follow-up plan of care. Strict ED return precautions were provided, including return/worsening of current symptoms, new symptoms, or any other concerns.                      Clinical Impression:   The encounter diagnosis was Strep pharyngitis.                             Francisco Patrick NP  12/31/18 2035       Shruthi Tirado MD  12/31/18 [id number]

## 2019-01-01 NOTE — DISCHARGE INSTRUCTIONS
Throw away toothbrush within 24 hr.  Increase oral intake and get plenty of rest.  Take over-the-counter ibuprofen or Tylenol as labeled as needed for pain or fever.  Follow up with daughters of [facility] in 1-2 days return to ED for any concerns or worsening symptoms.

## 2019-03-03 NOTE — ED PROVIDER NOTES
Encounter Date: 3/2/2019       History     Chief Complaint   Patient presents with    Abscess     To ER with c/o abscess to left flank area x 1 week.      Abhijit Marie, a 63 y.o. male  has a past medical history of COPD (chronic obstructive pulmonary disease), Hypertension, and Reactive airway disease (12/2/2014).     He presents to the ED for evaluation of abscess that has been present to left lower abdomen for about 1 week.  Patient states that he has been treating himself with peroxide and neosporin.  The area started draining yesterday.  Denies any fevers.  Attests to pain to the site.  No history of diabetes.            The history is provided by the patient.     Review of patient's allergies indicates:  No Known Allergies  Past Medical History:   Diagnosis Date    COPD (chronic obstructive pulmonary disease)     Hypertension     Reactive airway disease 12/2/2014     No past surgical history on file.  Family History   Problem Relation Age of Onset    Heart disease Mother     Heart disease Father      Social History     Tobacco Use    Smoking status: Current Every Day Smoker     Packs/day: 1.00     Years: 40.00     Pack years: 40.00     Types: Cigarettes    Smokeless tobacco: Never Used   Substance Use Topics    Alcohol use: No    Drug use: No     Review of Systems   Constitutional: Negative for fever.   Gastrointestinal: Negative for nausea and vomiting.   Skin: Positive for color change and wound.   Allergic/Immunologic: Negative for immunocompromised state.   Psychiatric/Behavioral: Negative for agitation.   All other systems reviewed and are negative.      Physical Exam     Initial Vitals [03/02/19 2006]   BP Pulse Resp Temp SpO2   135/87 75 18 98.4 °F (36.9 °C) 98 %      MAP       --         Physical Exam    Nursing note and vitals reviewed.  Constitutional: He appears well-developed and well-nourished.   HENT:   Head: Normocephalic and atraumatic.   Right Ear: External ear normal.   Left  Ear: External ear normal.   Nose: Nose normal.   Mouth/Throat: Oropharynx is clear and moist.   Eyes: EOM are normal.   Neck: Normal range of motion. Neck supple.   Cardiovascular: Normal rate and regular rhythm.   Pulmonary/Chest: Breath sounds normal. No respiratory distress.   Musculoskeletal: Normal range of motion. He exhibits no edema or tenderness.   Neurological: He is alert and oriented to person, place, and time. No cranial nerve deficit.   Skin: Skin is warm and dry. Capillary refill takes less than 2 seconds. Abscess noted.   5 cm area of induration and fluctuance with about 10-12 cm x 5 cm of underlying erythema.  No involvement of groin.     Psychiatric: He has a normal mood and affect. Thought content normal.         ED Course   I & D - Incision and Drainage  Date/Time: 3/2/2019 9:40 PM  Performed by: Tonja Thompson PA-C  Authorized by: Dayron Silver MD   Consent Done: Yes  Consent: Verbal consent obtained.  Consent given by: patient  Type: abscess  Body area: trunk  Location details: abdomen  Anesthesia: local infiltration    Anesthesia:  Local Anesthetic: lidocaine 1% without epinephrine and LET (lido,epi,tetracaine)  Patient sedated: no  Scalpel size: 11  Incision type: single straight  Complexity: simple  Drainage amount: scant  Wound treatment: incision,  wound left open and  deloculation (debrided)  Complications: No  Specimens: No  Implants: No  Patient tolerance: Patient tolerated the procedure well with no immediate complications        Labs Reviewed   CULTURE, AEROBIC  (SPECIFY SOURCE)   POCT GLUCOSE   POCT GLUCOSE MONITORING CONTINUOUS          Imaging Results    None                      Medical Decision Making:   Initial Assessment:   Abscess with cellulitis   Differential Diagnosis:   Abscess, folliculitis, cellulitis   ED Management:  Patient presents to ED for evaluation of wound to left lower abdomen present for one week.  Symptoms and exam consistent with abscess with  underlying cellulitis.  Area was I&D'ed and debrided with little production of pus.  Ancef given in ED.  Patient will be discharged home with wound care instruction.  Patient in agreement with plan and verbalized understanding.                        Clinical Impression:       ICD-10-CM ICD-9-CM   1. Abscess L02.91 682.9   2. Cellulitis, unspecified cellulitis site L03.90 682.9                                Tonja Thompson PA-C  03/02/19 2138       Tonja Thompson PA-C  03/02/19 2141

## 2019-03-03 NOTE — ED NOTES
Dressing applied to incision site. Pt instructed to wash incision with soap and water. Pt verbally responded how to clean area and how to dress wound. Pt also instructed the importance to complete all antibiotics and to watch for infection. Pt verbally responded with how to recognize infection and to return to the ER if that happens.

## 2019-03-03 NOTE — ED NOTES
Pt has abscess to left hip that started Monday. Pt has put peroxide and neosporin on with no improvement since last night.

## 2019-03-06 NOTE — PROVIDER PROGRESS NOTES - EMERGENCY DEPT.
Encounter Date: 3/2/2019    ED Physician Progress Notes        Physician Note:   03/04/19 9:32 AM - pt prescribed keflex and clindamycin for abdominal wall abscess.  Awaiting sensitivities.  PHOEBE    03/06/19 7:50 AM - culture is sensitive.  No further f/u needed.  PHOEBE

## 2019-03-28 PROBLEM — G25.3 MYOCLONIC JERKING: Status: ACTIVE | Noted: 2019-01-01

## 2019-03-28 PROBLEM — J96.02 ACUTE RESPIRATORY FAILURE WITH HYPOXIA AND HYPERCARBIA: Status: ACTIVE | Noted: 2019-01-01

## 2019-03-28 PROBLEM — N17.0 ACUTE TUBULAR NECROSIS: Status: ACTIVE | Noted: 2019-01-01

## 2019-03-28 PROBLEM — M62.82 NON-TRAUMATIC RHABDOMYOLYSIS: Status: ACTIVE | Noted: 2019-01-01

## 2019-03-28 PROBLEM — E87.20 LACTIC ACIDOSIS: Status: ACTIVE | Noted: 2019-01-01

## 2019-03-28 PROBLEM — E87.5 HYPERKALEMIA, DIMINISHED RENAL EXCRETION: Status: ACTIVE | Noted: 2019-01-01

## 2019-03-28 PROBLEM — I51.89 DIASTOLIC DYSFUNCTION: Status: ACTIVE | Noted: 2018-01-01

## 2019-03-28 PROBLEM — I46.9 CARDIAC ARREST WITH SUCCESSFUL RESUSCITATION: Status: ACTIVE | Noted: 2019-01-01

## 2019-03-28 PROBLEM — I51.89 DIASTOLIC DYSFUNCTION: Chronic | Status: ACTIVE | Noted: 2018-01-01

## 2019-03-28 PROBLEM — J96.01 ACUTE RESPIRATORY FAILURE WITH HYPOXIA AND HYPERCARBIA: Status: ACTIVE | Noted: 2019-01-01

## 2019-03-28 PROBLEM — K72.00 ISCHEMIC HEPATITIS: Status: ACTIVE | Noted: 2019-01-01

## 2019-03-28 PROBLEM — G93.1 ANOXIC BRAIN INJURY: Status: ACTIVE | Noted: 2019-01-01

## 2019-03-28 NOTE — ED PROVIDER NOTES
Encounter Date: 3/28/2019    SCRIBE #1 NOTE: I, Nany Washington, am scribing for, and in the presence of,  Dr. Perrin. I have scribed the entire note.       History     Chief Complaint   Patient presents with    Cardiac Arrest     found unresponsive by family members. last seen well at 1630, history of COPD     Abhijit Marie is a 63 y.o. male who  has a past medical history of COPD (chronic obstructive pulmonary disease), Hypertension, and Reactive airway disease (2014).    The patient presents to the ED via EMS after patient was found unresponsive by family members approximately 40 minutes PTA; last seen well at 4:30 PM tonight. Per EMS, CPR was initiated at 5:56PM. EMS reports  PEA rhythm with ROSC  at 6:11 PM. EMS administered 4 Epi and 1 sodium bicarbonate via IO. EMS reports suctioning 100 CC of saliva/vomitus PTA. Patient arrived with C-collar in place. Patient was previously seen in ED last night for COPD exacerbation.    The history is provided by the EMS personnel. The history is limited by the condition of the patient.     Review of patient's allergies indicates:  No Known Allergies  Past Medical History:   Diagnosis Date    COPD (chronic obstructive pulmonary disease)     Diastolic dysfunction 10/15/2018    Hypertension     Reactive airway disease 2014     History reviewed. No pertinent surgical history.  Family History   Problem Relation Age of Onset    Heart disease Mother     Heart disease Father      Social History     Tobacco Use    Smoking status: Former Smoker     Packs/day: 1.00     Years: 40.00     Pack years: 40.00     Types: Cigarettes     Last attempt to quit: 2018     Years since quittin.4    Smokeless tobacco: Never Used   Substance Use Topics    Alcohol use: No    Drug use: No     Types: Marijuana     Review of Systems   Unable to perform ROS: Acuity of condition       Physical Exam     Initial Vitals [19 1838]   BP Pulse Resp Temp SpO2   (!) 94/55 91 14  "-- 100 %      MAP       --         Physical Exam    Nursing note and vitals reviewed.  Constitutional: He appears well-developed and well-nourished.   No evidence of trauma   HENT:   Head: Normocephalic and atraumatic.   Eyes:   Pupils are not reactive to light  Pupils are fixed and dilated   Neck:   c-collar in place   Cardiovascular: Normal rate and regular rhythm.   Pulmonary/Chest:   Coarse breath sounds bilaterally.   Neurological: He is alert.   Skin: Skin is warm and dry. Capillary refill takes less than 2 seconds.         ED Course   Central Line  Date/Time: 3/28/2019 6:48 PM  Performed by: Gerardo Perrin Jr., MD  Consent Done: Emergent Situation  Time out: Immediately prior to procedure a "time out" was called to verify the correct patient, procedure, equipment, support staff and site/side marked as required.  Indications: vascular access  Preparation: skin prepped with ChloraPrep  Skin prep agent dried: skin prep agent completely dried prior to procedure  Sterile barriers: all five maximum sterile barriers used - cap, mask, sterile gown, sterile gloves, and large sterile sheet  Hand hygiene: hand hygiene performed prior to central venous catheter insertion  Location details: left femoral  Site selection rationale: emergent  Catheter type: triple lumen  Catheter size: 7 Fr  Catheter Length: 18cm    Ultrasound guidance: yes  Needle advanced into vessel with real time Ultrasound guidance.  Sterile sheath used.  Number of attempts: 1  Post-procedure: line sutured and chlorhexidine patch  Complications: No    Critical Care  Date/Time: 3/28/2019 7:35 PM  Performed by: Gerardo Perrin Jr., MD  Authorized by: Gerardo Perrin Jr., MD   Total critical care time (exclusive of procedural time) : 30 minutes  Critical care time was exclusive of separately billable procedures and treating other patients.  Critical care was necessary to treat or prevent imminent or life-threatening deterioration of the following " conditions: cardiac failure.  Critical care was time spent personally by me on the following activities: interpretation of cardiac output measurements, examination of patient, ordering and performing treatments and interventions, review of old charts, evaluation of patient's response to treatment, re-evaluation of patient's condition, ventilator management, development of treatment plan with patient or surrogate, discussions with consultants and ordering and review of radiographic studies.        Labs Reviewed   B-TYPE NATRIURETIC PEPTIDE - Abnormal; Notable for the following components:       Result Value     (*)     All other components within normal limits   CBC W/ AUTO DIFFERENTIAL - Abnormal; Notable for the following components:    RBC 4.45 (*)     Hemoglobin 12.6 (*)     MCHC 31.3 (*)     Mono # 0.2 (*)     Gran% 81.5 (*)     Lymph% 15.1 (*)     Mono% 1.7 (*)     All other components within normal limits   COMPREHENSIVE METABOLIC PANEL - Abnormal; Notable for the following components:    Potassium 5.2 (*)     CO2 18 (*)     Glucose 310 (*)     Creatinine 1.5 (*)     Total Protein 5.8 (*)     Albumin 3.2 (*)      (*)      (*)     Anion Gap 22 (*)     eGFR if  57 (*)     eGFR if non  49 (*)     All other components within normal limits   LACTIC ACID, PLASMA - Abnormal; Notable for the following components:    Lactate (Lactic Acid) 10.4 (*)     All other components within normal limits    Narrative:     LA critical result(s) called and verbal readback obtained from Shruthi Sterling RN on 03/28/19 at 19:21 by Alis Carreon , 03/28/2019 19:21   POCT GLUCOSE - Abnormal; Notable for the following components:    POCT Glucose 290 (*)     All other components within normal limits   ISTAT PROCEDURE - Abnormal; Notable for the following components:    POC PH 7.000 (*)     POC PCO2 93.7 (*)     POC PO2 621 (*)     POC HCO3 23.1 (*)     All other components within  normal limits   CULTURE, RESPIRATORY   CULTURE, BLOOD   CULTURE, BLOOD   TROPONIN I   DRUG SCREEN PANEL, URINE EMERGENCY   ALCOHOL,MEDICAL (ETHANOL)   TROPONIN I   BASIC METABOLIC PANEL   MAGNESIUM   PHOSPHORUS   CK   URINALYSIS   NEURON SPECIFIC ENOLASE, SERUM   GLUCOSE, RANDOM   GLUCOSE, RANDOM   GLUCOSE, RANDOM   GLUCOSE, RANDOM   POCT GLUCOSE MONITORING CONTINUOUS     EKG Readings: (Independently Interpreted)   Normal sinus rhythm at ventricular rate of 91 bpm, prominent Q waves, T wave invesion in lead I, III, V4-V6, QTc at 521.         Imaging Results          X-Ray Chest AP Portable (Final result)  Result time 03/28/19 21:51:35    Final result by Radha Oleary MD (03/28/19 21:51:35)                 Impression:      As above.      Electronically signed by: Radha Oleary MD  Date:    03/28/2019  Time:    21:51             Narrative:    EXAMINATION:  XR CHEST AP PORTABLE    CLINICAL HISTORY:  Encounter for fitting and adjustment of other gastrointestinal appliance and device    TECHNIQUE:  Single frontal view of the chest was performed.    COMPARISON:  19:17.    FINDINGS:  ET tube is visualized with distal tip in the mid trachea.  Support device overlies the right chest wall.  No significant change in cardiopulmonary status from earlier exam, noting that lung bases and costophrenic angles are incompletely included within field of view.  No evidence of pneumothorax.                               X-Ray Abdomen AP 1 View (KUB) (Final result)  Result time 03/28/19 20:56:32    Final result by Radha Oleary MD (03/28/19 20:56:32)                 Impression:      As above.      Electronically signed by: Radha Oleary MD  Date:    03/28/2019  Time:    20:56             Narrative:    EXAMINATION:  XR ABDOMEN AP 1 VIEW    CLINICAL HISTORY:  Encounter for adjustment and management of vascular access device    TECHNIQUE:  AP View(s) of the abdomen was performed.    COMPARISON:  None    FINDINGS:  Left-sided  femoral catheter is visualized with distal tip just left of midline at the L5-S1 level.  There is prominent gaseous distention of the stomach.  No evidence of abnormal small bowel dilatation or obstruction.  Nonspecific radiopaque line projects over the midline lower pelvis.                               CT Cervical Spine Without Contrast (Final result)  Result time 03/28/19 20:06:38    Final result by Agueda Ramos MD (03/28/19 20:06:38)                 Impression:      Motion degradation with degenerative changes.  No fracture or subluxation found.    Nasogastric tube is looped in the hypopharynx.      Electronically signed by: Agueda Ramos  Date:    03/28/2019  Time:    20:06             Narrative:    EXAMINATION:  CT CERVICAL SPINE WITHOUT CONTRAST    CLINICAL HISTORY:  s/p cardiac arrest, found down;    TECHNIQUE:  Low dose axial images, sagittal and coronal reformations were performed though the cervical spine.  Contrast was not administered.    COMPARISON:  None    FINDINGS:  There is motion degradation.  Endotracheal tube and nasogastric tubes are in place.  The nasogastric tube shows a coil in the hypopharynx.    No fracture or subluxation seen.  Motion degradation is most significant through the region of the dens.  The atlanto dens interval appears normal.  Lateral masses appear intact.  No facet fracture or malalignment.  No endplate erosion.  No compression deformity.  There is a chronic Schmorl node of the C7 superior endplate.  No skin edema or defect.  No foreign body.  There is a mild broad disc bulges at C3-4 and C4-5.    Lung apices show mild paraseptal emphysema.  No pneumothorax or pleural effusion.  Visualized mastoid sinuses and middle ears appear normal.                               CT Head Without Contrast (Final result)  Result time 03/28/19 20:12:40    Final result by Agueda Ramos MD (03/28/19 20:12:40)                 Impression:      No infarct or acute hemorrhage or  fracture.    Partial fluid filling of the paranasal sinuses may related to epistaxis, other fluid.    Poor dentition evident.      Electronically signed by: Agueda Ramos  Date:    03/28/2019  Time:    20:12             Narrative:    EXAMINATION:  CT HEAD WITHOUT CONTRAST    CLINICAL HISTORY:  s/p cardiac arrest;    TECHNIQUE:  Low dose axial images were obtained through the head.  Coronal and sagittal reformations were also performed. Contrast was not administered.  Scans are repeated for motion.    COMPARISON:  None.    FINDINGS:  Gray-white interface is preserved.  No midline shift or mass effect.  No acute infarct or hemorrhage.  No subdural collection.  The ventricles and basal cisterns appear normal.    There is moderate fluid filling of the paranasal sinuses which could be related to hemorrhage or other fluid.  The zygomatic arches and nasal bones and maxillary spines appear intact.    There is periapical lucency of the right mandibular canine is with associated caries involving tooth number 21 and 22.                               X-Ray Chest 1 View (Final result)  Result time 03/28/19 19:43:42    Final result by Ferny Arreguin MD (03/28/19 19:43:42)                 Impression:      Endotracheal tube tip projects in the midthoracic trachea.      Electronically signed by: Ferny Arreguin MD  Date:    03/28/2019  Time:    19:43             Narrative:    EXAMINATION:  XR CHEST 1 VIEW    CLINICAL HISTORY:  Cardiac arrest    TECHNIQUE:  Single frontal view of the chest was performed.    COMPARISON:  March 28, 2019    FINDINGS:  The endotracheal tube tip projects in the midthoracic trachea.    Lungs are clear.    No left effusion.  The right costophrenic angle is excluded from the field of view.    No pneumothorax.    No acute bone abnormality.                                 Medical Decision Making:   Differential Diagnosis:   Includes but not limited to: Cardiac failure, respiratory failure, fracture, ICH,  ACS, electrolyte abnormality  Clinical Tests:   Lab Tests: Ordered and Reviewed  Radiological Study: Ordered and Reviewed  Medical Tests: Ordered and Reviewed  ED Management:  7:03 PM   Discussed with Dr. Tapia of cardiology who reports EKG likely representing ischemic changes. Recommends serial Troponin and admission to medicine service.     7:30 PM   Case discussed with Dr. Mann who accepts patient under his service        62 yo male s/p cardiac arrest. Patient intubated in field and attained ROSC PTA. Central line placed given patient condition and in anticipation of neding vasopressors. Patient started on hypothermic protocol.Patient given aspirin suppository.   Patient's labs do not reveal elevated troponin, lactate/ abg are consistent with cardiac arrest. Patient normotensive at this time without pressor support. CT head/ neck are negative for acute trauma. CXR without significant pneumothorax. Patient admitted to ICU                       Clinical Impression:       ICD-10-CM ICD-9-CM   1. Cardiac arrest I46.9 427.5   2. Encounter for imaging study to confirm nasogastric (NG) tube placement Z01.89 V72.5   3. Diastolic dysfunction I51.9 429.9   4. Cardiac arrest with successful resuscitation I46.9 427.5     Disposition:   Disposition: Admitted  Condition: Critical    I, Gerardo Perrin,  personally performed the services described in this documentation. All medical record entries made by the scribe were at my direction and in my presence.  I have reviewed the chart and agree that the record reflects my personal performance and is accurate and complete. Gerardo Perrin M.D. 1:46 AM03/30/2019 Scribe attestation                    Gerardo Perrin Jr., MD  03/30/19 0148

## 2019-03-29 PROBLEM — R74.8 ABNORMAL LIVER ENZYMES: Status: ACTIVE | Noted: 2019-01-01

## 2019-03-29 PROBLEM — D72.829 LEUKOCYTOSIS: Status: ACTIVE | Noted: 2019-01-01

## 2019-03-29 NOTE — ASSESSMENT & PLAN NOTE
Chronic obstructive pulmonary disease with acute exacerbation  Acute respiratory failure with hypoxia and hypercarbia  Uses albuterol nebulizer at home. Continue duonebs every 4 hours. Started on budesonide neb.  Give methylprednisolone 80 mg IV every 8 hours. Ventilator management. Appreciate Pulmonary assistance.

## 2019-03-29 NOTE — PROCEDURES
Routine EEG Report      Abhijit Marie  [id number]  1955    DATE OF SERVICE: 3/29/2019  REASON FOR CONSULT:  63-year-old man admitted after cardiorespiratory arrest.  Evaluate for evidence of epileptiform activity.    METHODOLOGY   Electroencephalographic (EEG) recording is with electrodes placed according to the International 10-20 placement system.  Thirty two (32) channels of digital signal (sampling rate of 512/sec) including T1 and T2 was simultaneously recorded from the scalp and may include  EKG, EMG, and/or eye monitors.  Recording band pass was 0.1 to 512 hz.  Digital video recording of the patient is simultaneously recorded with the EEG.  The patient is instructed report clinical symptoms which may occur during the recording session.  EEG and video recording is stored and archived in digital format. Activation procedures which include photic stimulation, hyperventilation and instructing patients to perform simple task are done in selected patients.    The EEG is displayed on a monitor screen and can be reviewed using different montages.  Computer assisted analysis is employed to detect spike and electrographic seizure activity.   The entire record is submitted for computer analysis.  The entire recording is visually reviewed and the times identified by computer analysis as being spikes or seizures are reviewed again.  Compresses spectral analysis (CSA) is also performed on the activity recorded from each individual channel.  This is displayed as a power display of frequencies from 0 to 30 Hz over time.   The CSA is reviewed looking for asymmetries in power between homologous areas of the scalp and then compared with the original EEG recording.     Treato software is also utilized in the review of this study.  This software suite analyzes the EEG recording in multiple domains.  Coherence and rhythmicity is computed to identify EEG sections which may contain organized seizures.  Each channel undergoes  analysis to detect presence of spike and sharp waves which have special and morphological characteristic of epileptic activity.  The routine EEG recording is converted from spacial into frequency domain.  This is then displayed comparing homologous areas to identify areas of significant asymmetry.  Algorithm to identify non-cortically generated artifact is used to separate eye movement, EMG and other artifact from the EEG.      EEG FINDINGS  Background activity:   The background is burst suppressed.  There are 1-2 second bursts of moderate voltage mixed frequency theta/delta activity  by generalized suppressions lasting 3 sec - 5 min.    Sleep:  There is no normal sleep architecture.    Activation procedures:   Hyperventilation is not performed   Photic stimulation is performed with no activation of the record    Cardiac Monitor:   Heart rate is regular with rates ranging from 60-70 bpm.     IMPRESSION:   This is an abnormal routine EEG because of a burst suppressed background with generalized background suppressions lasting up to 5 min.  This is consistent with a severe encephalopathy.  This finding is nonspecific with regards to etiology but can be seen in the setting of toxic/metabolic derangements, infection, as a medication effect, and in anoxia.  There are no epileptiform discharges and no electrographic seizures.    Michela Newman MD PhD  Neurology-Epilepsy  [facility].  Office extension: [phone]

## 2019-03-29 NOTE — ASSESSMENT & PLAN NOTE
Takes hydrochlorothiazide, amlodipine at home. Hold for now. SBP ranged 90 to 261. Continue to monitor.

## 2019-03-29 NOTE — ASSESSMENT & PLAN NOTE
Chronic obstructive pulmonary disease with acute exacerbation  Acute respiratory failure with hypoxia and hypercarbia  Uses albuterol nebulizer at home. Give every 4 hours. Continue home fluticasone-vilanterol. Give methylprednisolone 80 mg IV every 8 hours. Ventilator management.

## 2019-03-29 NOTE — ED NOTES
Assumed care of pt. Received bedside report from Roxy RN and Catherine RN. Dr Perrin at bedside preparing for central line insertion. Tushar, RRT and Harsh RRT at bedside

## 2019-03-29 NOTE — ED NOTES
Respiratory advanced ETT tube per verbal order by Dr Perrin after viewing chest xray; per verbal order by Jaja Vergara, RN readjusted NG tube and gastric contents visualized

## 2019-03-29 NOTE — PLAN OF CARE
Pt intubated; no family at bedside.  Per H& P:  Cardiac Arrest        found unresponsive by family members. last seen well at 1630, history of COPD     HX: hypertension, history of cigarette smoking (quit 9/28/18), and chronic obstructive pulmonary disease with history of ICU admission for exacerbation. He lives in [place], [place]. He has a fiancee with whom he has a 15 year old daughter named Shruthi. He works for [employer].       In the emergency department, he was found to have lactic acidosis, hypercapnia, acute kidney injury, acute hepatic injury, rhabdomyolysis. He had no neurologic responses. He was admitted to [facility]. He displayed myoclonic activity.        PEGGY Nurse on unit     TN informed MARY Klein , ICU Nurse to call TN if family arrives.       03/29/19 1018   Discharge Assessment   Assessment Type Discharge Planning Assessment   Assessment information obtained from? Medical Record   Expected Length of Stay (days)   (tbd)   Prior to hospitilization cognitive status: Alert/Oriented   Prior to hospitalization functional status: Independent   Current cognitive status: Coma/Sedated/Intubated   Current Functional Status: Completely Dependent

## 2019-03-29 NOTE — SUBJECTIVE & OBJECTIVE
Past Medical History:   Diagnosis Date    COPD (chronic obstructive pulmonary disease)     Diastolic dysfunction 10/15/2018    Hypertension     Reactive airway disease 12/2/2014       History reviewed. No pertinent surgical history.    Review of patient's allergies indicates:  No Known Allergies    Current Facility-Administered Medications on File Prior to Encounter   Medication    [COMPLETED] albuterol sulfate nebulizer solution 15 mg    [COMPLETED] albuterol sulfate nebulizer solution 2.5 mg    [COMPLETED] hydrALAZINE injection 10 mg    [COMPLETED] ipratropium 0.02 % nebulizer solution 1 mg    [COMPLETED] methylPREDNISolone sodium succinate injection 125 mg     Current Outpatient Medications on File Prior to Encounter   Medication Sig    albuterol (PROVENTIL) 2.5 mg /3 mL (0.083 %) nebulizer solution Take 3 mLs (2.5 mg total) by nebulization every 4 (four) hours as needed for Wheezing or Shortness of Breath. Rescue    albuterol (PROVENTIL/VENTOLIN HFA) 90 mcg/actuation inhaler Inhale 1-2 puffs into the lungs every 4 (four) hours as needed for Wheezing or Shortness of Breath.    amLODIPine (NORVASC) 10 MG tablet Take 1 tablet (10 mg total) by mouth once daily.    fluticasone-vilanterol (BREO) 200-25 mcg/dose DsDv diskus inhaler Inhale 1 puff into the lungs once daily. Controller    hydroCHLOROthiazide (HYDRODIURIL) 25 MG tablet Take 1 tablet (25 mg total) by mouth once daily.    naproxen (NAPROSYN) 500 MG tablet Take 1 tablet (500 mg total) by mouth 2 (two) times daily with meals.    nebulizer and compressor [place] use as directed    nicotine (NICODERM CQ) 21 mg/24 hr Place 1 patch onto the skin once daily. No prescription needed.    [DISCONTINUED] albuterol (PROVENTIL) 2.5 mg /3 mL (0.083 %) nebulizer solution Take 3 mLs (2.5 mg total) by nebulization every 4 (four) hours as needed for Wheezing or Shortness of Breath. Rescue    [DISCONTINUED] albuterol (PROVENTIL/VENTOLIN HFA) 90 mcg/actuation  inhaler Inhale 1-2 puffs into the lungs every 4 (four) hours as needed for Wheezing or Shortness of Breath.    [DISCONTINUED] amLODIPine (NORVASC) 10 MG tablet Take 1 tablet (10 mg total) by mouth once daily.    [DISCONTINUED] hydroCHLOROthiazide (HYDRODIURIL) 25 MG tablet Take 1 tablet (25 mg total) by mouth once daily.     Family History     Problem Relation (Age of Onset)    Heart disease Mother, Father        Tobacco Use    Smoking status: Former Smoker     Packs/day: 1.00     Years: 40.00     Pack years: 40.00     Types: Cigarettes     Last attempt to quit: 2018     Years since quittin.4    Smokeless tobacco: Never Used   Substance and Sexual Activity    Alcohol use: No    Drug use: No     Types: Marijuana    Sexual activity: Not on file     Review of Systems   Unable to perform ROS: Patient unresponsive   Respiratory: Positive for shortness of breath.    Neurological: Positive for weakness.     Objective:     Vital Signs (Most Recent):  Temp: (!) 95.9 °F (35.5 °C) (19)  Pulse: 98 (19)  Resp: (!) 25 (19)  BP: (!) 147/84 (19)  SpO2: 99 % (19) Vital Signs (24h Range):  Temp:  [95.9 °F (35.5 °C)-98.2 °F (36.8 °C)] 95.9 °F (35.5 °C)  Pulse:  [] 98  Resp:  [14-31] 25  SpO2:  [90 %-100 %] 99 %  BP: ()/() 147/84     Weight: 90.7 kg (200 lb)  Body mass index is 25.68 kg/m².    Physical Exam   Constitutional: He appears well-developed. No distress. He is sedated and intubated.   HENT:   Head: Normocephalic and atraumatic.   Eyes: Right eye exhibits no discharge. Left eye exhibits no discharge.   Taped shut   Neck: Neck supple. No tracheal deviation present.   Cardiovascular: Normal rate and regular rhythm.   Pulmonary/Chest: He is intubated. He has wheezes. He has no rales.   Abdominal: Soft. He exhibits no distension. There is no tenderness. There is no guarding.   Musculoskeletal: He exhibits no edema or tenderness.    Neurological: He is unresponsive. He displays seizure activity (perioral movements).   Skin: Skin is warm and dry.   Psychiatric:   Cannot assess   Nursing note and vitals reviewed.          Significant Labs: All pertinent labs within the past 24 hours have been reviewed.    Significant Imaging: I have reviewed all pertinent imaging results/findings within the past 24 hours.   X-Ray Chest 1 View 3/28/19: FINDINGS:  The endotracheal tube tip projects in the midthoracic trachea.  Lungs are clear.  No left effusion.  The right costophrenic angle is excluded from the field of view.  No pneumothorax.  No acute bone abnormality.  CT Head Without Contrast 3/28/19:  FINDINGS:  Gray-white interface is preserved.  No midline shift or mass effect.  No acute infarct or hemorrhage.  No subdural collection.  The ventricles and basal cisterns appear normal.  There is moderate fluid filling of the paranasal sinuses which could be related to hemorrhage or other fluid.  The zygomatic arches and nasal bones and maxillary spines appear intact.  There is periapical lucency of the right mandibular canine is with associated caries involving tooth number 21 and 22.  Impression:  No infarct or acute hemorrhage or fracture.  Partial fluid filling of the paranasal sinuses may related to epistaxis, other fluid.  Poor dentition evident.  CT Cervical Spine Without Contrast 3/28/19:   FINDINGS:  There is motion degradation.  Endotracheal tube and nasogastric tubes are in place.  The nasogastric tube shows a coil in the hypopharynx.  No fracture or subluxation seen.  Motion degradation is most significant through the region of the dens.  The atlanto dens interval appears normal.  Lateral masses appear intact.  No facet fracture or malalignment.  No endplate erosion.  No compression deformity.  There is a chronic Schmorl node of the C7 superior endplate.  No skin edema or defect.  No foreign body.  There is a mild broad disc bulges at C3-4 and  C4-5.  Lung apices show mild paraseptal emphysema.  No pneumothorax or pleural effusion.  Visualized mastoid sinuses and middle ears appear normal.  Impression:   Motion degradation with degenerative changes.  No fracture or subluxation found.  Nasogastric tube is looped in the hypopharynx.

## 2019-03-29 NOTE — SUBJECTIVE & OBJECTIVE
Interval History: He reached goal temperature of 33 C on 3/29 at 0355. Undergoing EEG at the time of rounds. Has rooting reflex apparent on ETT.     Review of Systems   Unable to perform ROS: Intubated     Objective:     Vital Signs (Most Recent):  Temp: (!) 91.6 °F (33.1 °C) (03/29/19 1552)  Pulse: 79 (03/29/19 1552)  Resp: (!) 30 (03/29/19 1552)  BP: 119/84 (03/29/19 1552)  SpO2: 99 % (03/29/19 1552) Vital Signs (24h Range):  Temp:  [90.5 °F (32.5 °C)-96.4 °F (35.8 °C)] 91.6 °F (33.1 °C)  Pulse:  [] 79  Resp:  [2-31] 30  SpO2:  [97 %-100 %] 99 %  BP: ()/() 119/84     Weight: 92.1 kg (203 lb 0.7 oz)  Body mass index is 24.72 kg/m².    Intake/Output Summary (Last 24 hours) at 3/29/2019 1605  Last data filed at 3/29/2019 1200  Gross per 24 hour   Intake 1478.93 ml   Output 850 ml   Net 628.93 ml      Physical Exam   Constitutional: He appears well-developed and well-nourished.   HENT:   Head: Normocephalic and atraumatic.   OGT/ETT in place   Eyes: Pupils are equal, round, and reactive to light.   Cardiovascular: Normal rate and regular rhythm.   No murmur heard.  Pulmonary/Chest: Effort normal and breath sounds normal. No respiratory distress.   Abdominal: Soft. Bowel sounds are normal. He exhibits no distension. There is no tenderness.   Musculoskeletal: He exhibits no edema or tenderness.   Neurological: He is unresponsive.   Skin: Skin is dry.   Cool to touch   Nursing note and vitals reviewed.      Significant Labs:   ABGs:   Recent Labs   Lab 03/29/19  0957   PH 7.243*   PCO2 47.4*   HCO3 20.4*   POCSATURATED 99   BE -7     CBC:   Recent Labs   Lab 03/28/19  0328 03/28/19  1838 03/29/19  0430   WBC 7.67 9.48 21.86*   HGB 14.2 12.6* 14.2   HCT 43.8 40.2 45.1    258 242     CMP:   Recent Labs   Lab 03/28/19  0328 03/28/19  1838  03/29/19  0430 03/29/19  0754 03/29/19  1132    141   < > 143 142 143   K 4.0 5.2*   < > 4.2  4.2 4.0  4.0 4.0  4.0    101   < > 106 106 106   CO2  26 18*   < > 22* 22* 21*   * 310*   < > 143*  143* 178*  178* 195*  195*   BUN 15 16   < > 24* 30* 31*   CREATININE 1.0 1.5*   < > 1.2 1.3 1.3   CALCIUM 9.8 8.8   < > 8.9 8.9 8.8   PROT 7.5 5.8*  --  7.0  --   --    ALBUMIN 4.1 3.2*  --  3.7  --   --    BILITOT 0.4 0.4  --  0.3  --   --    ALKPHOS 69 59  --  68  --   --    AST 35 125*  --  153*  --   --    ALT 40 164*  --  209*  --   --    ANIONGAP 8 22*   < > 15 14 16   EGFRNONAA >60 49*   < > >60 58* 58*    < > = values in this interval not displayed.     Cardiac Markers:   Recent Labs   Lab 03/28/19  1838   *     Coagulation:   Recent Labs   Lab 03/29/19  0430   INR 1.0   APTT 30.4     Lactic Acid:   Recent Labs   Lab 03/28/19  1838 03/29/19  0430   LACTATE 10.4* 5.7*     Magnesium:   Recent Labs   Lab 03/28/19  2326 03/29/19  0754 03/29/19  1132   MG 2.9*  2.9* 2.6 3.3*     Troponin:   Recent Labs   Lab 03/28/19  1938 03/28/19  2325 03/29/19  0430   TROPONINI <0.006 0.020 0.021       Significant Imaging: I have reviewed all pertinent imaging results/findings within the past 24 hours.

## 2019-03-29 NOTE — ED NOTES
PT arrived via EMS as a resuscitated code, intubated 8.0 ET tube in place per EMS. PT had a palpable, faint pulse upon arrival. Pts faace was covered in vomitus; pt suctioned 225 mls of vomitus. EMS suctioned 100mls of saliva/vomitus prior to arrival. EMS reports family found pt down at 1745 after pt was not heard from since 1630. EMS reports CPR began at 1756 due to PEA rhythm; pt arrived to ED at 1829. EMS reports ROSC at 1811 after receiving 4 epi and 1 sodium bicarb via IO to left shoulder by EMS. EMS reports CBG of 230. PTs hx: HTN, emphysema. PT was seen in [place] ED yesterday.

## 2019-03-29 NOTE — EICU
eICU Note :    Called by the [facility]:    Problem: 1 Hyperkalemia and Vent orders    Pertinent History and labs reviewed :64 y/o male with COPD exacerbation , Cigarette and nicotine dependence  has Chronic obstructive pulmonary disease; Cigarette nicotine dependence without complication; Chronic obstructive pulmonary disease with acute exacerbation; Essential hypertension; Diastolic dysfunction; Acute tubular necrosis; Ischemic hepatitis; Lactic acidosis; Acute respiratory failure with hypoxia and hypercarbia; Cardiac arrest with successful resuscitation; Anoxic brain injury; Hyperkalemia, diminished renal excretion; Non-traumatic rhabdomyolysis; and Myoclonic jerking on their problem list.      Treatment /Intervention given: Kayexalate and Vent orders placed        Pam Monson M.D  eICU Physician  6:49 AM  AB.1 9/59/270/23/-5/100% on tidal volume 500, rate of 28.  Will reduce the FiO2 to 30% and increase the rate to 32

## 2019-03-29 NOTE — H&P
[facility]  History & Physical    Patient Name: Abhijit Marie  MRN: [id number]  Admission Date: 3/28/2019  Attending Physician: Rogelio Grant MD  Primary Care Provider: Primary Doctor No         Patient information was obtained from spouse/SO, past medical records and ER records.     Subjective:     Principal Problem:Cardiac arrest with successful resuscitation    Chief Complaint:   Chief Complaint   Patient presents with    Cardiac Arrest     found unresponsive by family members. last seen well at 1630, history of COPD        HPI: Abhijit Marie is a 63 y.o. black man with hypertension, history of cigarette smoking (quit 9/28/18), and chronic obstructive pulmonary disease with history of ICU admission for exacerbation. He lives in [place], [place]. He has a fiancee with whom he has a 15 month old daughter named Shruthi. He works for [employer].   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 His oxygen saturation was 90% with tachypnea and blood pressure of 224/128. He was given albuterol, ipratropium, methylprednisolone, and hydralazine and discharged home after feeling somewhat better. He still had shortness of breath. When he went to work, he told his fiancee that he was having hot sweats. When he returned home, he felt weak. His fiancee went out to get him something to eat and drink around 16:30. When his fiancee returned home, she found him on the floor unresponsive. She could barely feel his pulse. EMS was called and found him to have pulseless electrical activity, and CPR was initiated at 17:56 pm. Pulse was regained at 18:11 pm. He had 4 doses of epinephrine and 1 dose of sodium bicarbonate given via interosseous line, and 100 cc of saliva/vomitues was suctioned from his airway. He was intubated. In the emergency department,  he was found to have lactic acidosis, hypercapnia, acute kidney injury, acute hepatic injury, rhabdomyolysis. He had no neurologic responses. He was admitted to [facility]. He displayed myoclonic activity.     Past Medical History:   Diagnosis Date    COPD (chronic obstructive pulmonary disease)     Diastolic dysfunction 10/15/2018    Hypertension     Reactive airway disease 12/2/2014       History reviewed. No pertinent surgical history.    Review of patient's allergies indicates:  No Known Allergies    Current Facility-Administered Medications on File Prior to Encounter   Medication    [COMPLETED] albuterol sulfate nebulizer solution 15 mg    [COMPLETED] albuterol sulfate nebulizer solution 2.5 mg    [COMPLETED] hydrALAZINE injection 10 mg    [COMPLETED] ipratropium 0.02 % nebulizer solution 1 mg    [COMPLETED] methylPREDNISolone sodium succinate injection 125 mg     Current Outpatient Medications on File Prior to Encounter   Medication Sig    albuterol (PROVENTIL) 2.5 mg /3 mL (0.083 %) nebulizer solution Take 3 mLs (2.5 mg total) by nebulization every 4 (four) hours as needed for Wheezing or Shortness of Breath. Rescue    albuterol (PROVENTIL/VENTOLIN HFA) 90 mcg/actuation inhaler Inhale 1-2 puffs into the lungs every 4 (four) hours as needed for Wheezing or Shortness of Breath.    amLODIPine (NORVASC) 10 MG tablet Take 1 tablet (10 mg total) by mouth once daily.    fluticasone-vilanterol (BREO) 200-25 mcg/dose DsDv diskus inhaler Inhale 1 puff into the lungs once daily. Controller    hydroCHLOROthiazide (HYDRODIURIL) 25 MG tablet Take 1 tablet (25 mg total) by mouth once daily.    naproxen (NAPROSYN) 500 MG tablet Take 1 tablet (500 mg total) by mouth 2 (two) times daily with meals.    nebulizer and compressor [place] use as directed    nicotine (NICODERM CQ) 21 mg/24 hr Place 1 patch onto the skin once daily. No prescription needed.    [DISCONTINUED] albuterol (PROVENTIL) 2.5 mg  /3 mL (0.083 %) nebulizer solution Take 3 mLs (2.5 mg total) by nebulization every 4 (four) hours as needed for Wheezing or Shortness of Breath. Rescue    [DISCONTINUED] albuterol (PROVENTIL/VENTOLIN HFA) 90 mcg/actuation inhaler Inhale 1-2 puffs into the lungs every 4 (four) hours as needed for Wheezing or Shortness of Breath.    [DISCONTINUED] amLODIPine (NORVASC) 10 MG tablet Take 1 tablet (10 mg total) by mouth once daily.    [DISCONTINUED] hydroCHLOROthiazide (HYDRODIURIL) 25 MG tablet Take 1 tablet (25 mg total) by mouth once daily.     Family History     Problem Relation (Age of Onset)    Heart disease Mother, Father        Tobacco Use    Smoking status: Former Smoker     Packs/day: 1.00     Years: 40.00     Pack years: 40.00     Types: Cigarettes     Last attempt to quit: 2018     Years since quittin.4    Smokeless tobacco: Never Used   Substance and Sexual Activity    Alcohol use: No    Drug use: No     Types: Marijuana    Sexual activity: Not on file     Review of Systems   Unable to perform ROS: Patient unresponsive   Respiratory: Positive for shortness of breath.    Neurological: Positive for weakness.     Objective:     Vital Signs (Most Recent):  Temp: (!) 95.9 °F (35.5 °C) (19)  Pulse: 98 (19)  Resp: (!) 25 (19)  BP: (!) 147/84 (19)  SpO2: 99 % (19) Vital Signs (24h Range):  Temp:  [95.9 °F (35.5 °C)-98.2 °F (36.8 °C)] 95.9 °F (35.5 °C)  Pulse:  [] 98  Resp:  [14-31] 25  SpO2:  [90 %-100 %] 99 %  BP: ()/() 147/84     Weight: 90.7 kg (200 lb)  Body mass index is 25.68 kg/m².    Physical Exam   Constitutional: He appears well-developed. No distress. He is sedated and intubated.   HENT:   Head: Normocephalic and atraumatic.   Eyes: Right eye exhibits no discharge. Left eye exhibits no discharge.   Taped shut   Neck: Neck supple. No tracheal deviation present.   Cardiovascular: Normal rate and regular rhythm.    Pulmonary/Chest: He is intubated. He has wheezes. He has no rales.   Abdominal: Soft. He exhibits no distension. There is no tenderness. There is no guarding.   Musculoskeletal: He exhibits no edema or tenderness.   Neurological: He is unresponsive. He displays seizure activity (perioral movements).   Skin: Skin is warm and dry.   Psychiatric:   Cannot assess   Nursing note and vitals reviewed.          Significant Labs: All pertinent labs within the past 24 hours have been reviewed.    Significant Imaging: I have reviewed all pertinent imaging results/findings within the past 24 hours.   X-Ray Chest 1 View 3/28/19: FINDINGS:  The endotracheal tube tip projects in the midthoracic trachea.  Lungs are clear.  No left effusion.  The right costophrenic angle is excluded from the field of view.  No pneumothorax.  No acute bone abnormality.  CT Head Without Contrast 3/28/19:  FINDINGS:  Gray-white interface is preserved.  No midline shift or mass effect.  No acute infarct or hemorrhage.  No subdural collection.  The ventricles and basal cisterns appear normal.  There is moderate fluid filling of the paranasal sinuses which could be related to hemorrhage or other fluid.  The zygomatic arches and nasal bones and maxillary spines appear intact.  There is periapical lucency of the right mandibular canine is with associated caries involving tooth number 21 and 22.  Impression:  No infarct or acute hemorrhage or fracture.  Partial fluid filling of the paranasal sinuses may related to epistaxis, other fluid.  Poor dentition evident.  CT Cervical Spine Without Contrast 3/28/19:   FINDINGS:  There is motion degradation.  Endotracheal tube and nasogastric tubes are in place.  The nasogastric tube shows a coil in the hypopharynx.  No fracture or subluxation seen.  Motion degradation is most significant through the region of the dens.  The atlanto dens interval appears normal.  Lateral masses appear intact.  No facet fracture or  malalignment.  No endplate erosion.  No compression deformity.  There is a chronic Schmorl node of the C7 superior endplate.  No skin edema or defect.  No foreign body.  There is a mild broad disc bulges at C3-4 and C4-5.  Lung apices show mild paraseptal emphysema.  No pneumothorax or pleural effusion.  Visualized mastoid sinuses and middle ears appear normal.  Impression:   Motion degradation with degenerative changes.  No fracture or subluxation found.  Nasogastric tube is looped in the hypopharynx.    Assessment/Plan:     * Cardiac arrest with successful resuscitation  Lactic acidosis  Acute tubular necrosis  Hyperkalemia, diminished renal excretion  Ischemic hepatitis  Anoxic brain injury  Myoclonic jerking  Non-traumatic rhabdomyolysis  Monitor labs. Hypothermia protocol. Consult Pulmonology for ventilator management. Wean FiO2 to prevent oxygen toxicity. Propofol for sedation. Levetiracetam, lorazepam prn for myoclonic jerking. Also give a loading dose of valproic acid. Electroencephalogram to evaluate for status epilepticus. Echocardiogram to evaluate cardiac function. IV fluids at 125 mL/hr for 8 hours then 100 mL/hr for 20 hours, reorder as necessary for rhabdomyolysis. NG tube was in bad position, removed. Will give medications IV and rectal for now. Treat underlying problem, COPD. Prognosis discussed with [place].    Essential hypertension  Takes hydrochlorothiazide, amlodipine at home. Hold for now.    Cigarette nicotine dependence without complication  Quit last year.    Chronic obstructive pulmonary disease  Chronic obstructive pulmonary disease with acute exacerbation  Acute respiratory failure with hypoxia and hypercarbia  Uses albuterol nebulizer at home. Give every 4 hours. Continue home fluticasone-vilanterol. Give methylprednisolone 80 mg IV every 8 hours. Ventilator management.      VTE Risk Mitigation (From admission, onward)        Ordered     IP VTE HIGH RISK PATIENT  Once      03/28/19 9209      enoxaparin injection 40 mg  Daily      03/28/19 2229     Place sequential compression device  Until discontinued      03/28/19 1943        Critical Care Time 75 minutes       Chai Mann MD  Department of Hospital Medicine   [facility]

## 2019-03-29 NOTE — NURSING
Pt arrived to ICU room 259 from ED via stretcher on cardiac monitor, arctic sun, ambu bag and gtts. Pt connected to cardiac monitor, arctic sun connected and restarted, placed on vent machine. Core bladder temperature prob connected to arctic sun. Rectal probe connected to monitor. Arctic sun set at 33 degrees C. Induction started on 3/28 at 2133. Pt reached 33 degrees C on 3/29 at 0355. VSS - HR 60's, sats 100% on vent settings, BP WNL. Eyes taped softly closed, no response to stimuli. No withdrawing. Jerking movements - similar to seizure like activity. Urine output minimal. OG tube placed - LIWS. Rust colored drainage. Dr. Boswell, [facility], cameraed in to assess pt. Will continue to monitor.

## 2019-03-29 NOTE — ASSESSMENT & PLAN NOTE
Lactic acidosis  Acute tubular necrosis  Hyperkalemia, diminished renal excretion  Ischemic hepatitis  Abnormal liver enzymes  Anoxic brain injury  Myoclonic jerking  Non-traumatic rhabdomyolysis  Leukocytosis  Monitor labs. Hypothermia protocol and reached target temperature of 33 C at 0355 this AM. Maintain at this temperature for 24 hours. Appreciate Pulmonology assistance. Wean FiO2 to prevent oxygen toxicity. Propofol for sedation. Levetiracetam 1000 mg BID, lorazepam prn for myoclonic jerking. Also given a loading dose of valproic acid. Electroencephalogram done today and negative for epileptic activity. Echocardiogram to evaluate cardiac function. IV fluids for rhabdomyolysis. Will check hepatitis panel with the elevated LFTs.

## 2019-03-29 NOTE — PROGRESS NOTES
[facility]  Progress Note    Patient Name: Abhijit Marie  MRN: [id number]  Patient Class: IP- Inpatient   Admission Date: 3/28/2019  Length of Stay: 1 days  Attending Physician: Rogelio Grant MD  Primary Care Provider: Primary Doctor No        Subjective:     Principal Problem:Cardiac arrest    HPI:  Abhijit Marie is a 63 y.o. black man with hypertension, history of cigarette smoking (quit 9/28/18), and chronic obstructive pulmonary disease with history of ICU admission for exacerbation. He lives in [place], [place]. He has a fiancee with whom he has a 15 month old daughter named Shruthi. He works for [employer].   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 His oxygen saturation was 90% with tachypnea and blood pressure of 224/128. He was given albuterol, ipratropium, methylprednisolone, and hydralazine and discharged home after feeling somewhat better. He still had shortness of breath. When he went to work, he told his fiancee that he was having hot sweats. When he returned home, he felt weak. His fiancee went out to get him something to eat and drink around 16:30. When his fiancee returned home, she found him on the floor unresponsive. She could barely feel his pulse. EMS was called and found him to have pulseless electrical activity, and CPR was initiated at 17:56 pm. Pulse was regained at 18:11 pm. He had 4 doses of epinephrine and 1 dose of sodium bicarbonate given via interosseous line, and 100 cc of saliva/vomitues was suctioned from his airway. He was intubated. In the emergency department, he was found to have lactic acidosis, hypercapnia, acute kidney injury, acute hepatic injury, rhabdomyolysis. He had no neurologic responses. He was admitted to [facility]. He displayed myoclonic activity.     [facility]  Course:  No notes on file    Interval History: He reached goal temperature of 33 C on 3/29 at 0355. Undergoing EEG at the time of rounds. Has rooting reflex apparent on ETT.     Review of Systems   Unable to perform ROS: Intubated     Objective:     Vital Signs (Most Recent):  Temp: (!) 91.6 °F (33.1 °C) (03/29/19 1552)  Pulse: 79 (03/29/19 1552)  Resp: (!) 30 (03/29/19 1552)  BP: 119/84 (03/29/19 1552)  SpO2: 99 % (03/29/19 1552) Vital Signs (24h Range):  Temp:  [90.5 °F (32.5 °C)-96.4 °F (35.8 °C)] 91.6 °F (33.1 °C)  Pulse:  [] 79  Resp:  [2-31] 30  SpO2:  [97 %-100 %] 99 %  BP: ()/() 119/84     Weight: 92.1 kg (203 lb 0.7 oz)  Body mass index is 24.72 kg/m².    Intake/Output Summary (Last 24 hours) at 3/29/2019 1605  Last data filed at 3/29/2019 1200  Gross per 24 hour   Intake 1478.93 ml   Output 850 ml   Net 628.93 ml      Physical Exam   Constitutional: He appears well-developed and well-nourished.   HENT:   Head: Normocephalic and atraumatic.   OGT/ETT in place   Eyes: Pupils are equal, round, and reactive to light.   Cardiovascular: Normal rate and regular rhythm.   No murmur heard.  Pulmonary/Chest: Effort normal and breath sounds normal. No respiratory distress.   Abdominal: Soft. Bowel sounds are normal. He exhibits no distension. There is no tenderness.   Musculoskeletal: He exhibits no edema or tenderness.   Neurological: He is unresponsive.   Skin: Skin is dry.   Cool to touch   Nursing note and vitals reviewed.      Significant Labs:   ABGs:   Recent Labs   Lab 03/29/19  0957   PH 7.243*   PCO2 47.4*   HCO3 20.4*   POCSATURATED 99   BE -7     CBC:   Recent Labs   Lab 03/28/19  0328 03/28/19  1838 03/29/19  0430   WBC 7.67 9.48 21.86*   HGB 14.2 12.6* 14.2   HCT 43.8 40.2 45.1    258 242     CMP:   Recent Labs   Lab 03/28/19  0328 03/28/19  1838  03/29/19  0430 03/29/19  0754 03/29/19  1132    141   < > 143 142 143   K 4.0 5.2*   < > 4.2  4.2 4.0  4.0 4.0  4.0     101   < > 106 106 106   CO2 26 18*   < > 22* 22* 21*   * 310*   < > 143*  143* 178*  178* 195*  195*   BUN 15 16   < > 24* 30* 31*   CREATININE 1.0 1.5*   < > 1.2 1.3 1.3   CALCIUM 9.8 8.8   < > 8.9 8.9 8.8   PROT 7.5 5.8*  --  7.0  --   --    ALBUMIN 4.1 3.2*  --  3.7  --   --    BILITOT 0.4 0.4  --  0.3  --   --    ALKPHOS 69 59  --  68  --   --    AST 35 125*  --  153*  --   --    ALT 40 164*  --  209*  --   --    ANIONGAP 8 22*   < > 15 14 16   EGFRNONAA >60 49*   < > >60 58* 58*    < > = values in this interval not displayed.     Cardiac Markers:   Recent Labs   Lab 03/28/19  1838   *     Coagulation:   Recent Labs   Lab 03/29/19  0430   INR 1.0   APTT 30.4     Lactic Acid:   Recent Labs   Lab 03/28/19  1838 03/29/19  0430   LACTATE 10.4* 5.7*     Magnesium:   Recent Labs   Lab 03/28/19  2326 03/29/19  0754 03/29/19  1132   MG 2.9*  2.9* 2.6 3.3*     Troponin:   Recent Labs   Lab 03/28/19  1938 03/28/19  2325 03/29/19  0430   TROPONINI <0.006 0.020 0.021       Significant Imaging: I have reviewed all pertinent imaging results/findings within the past 24 hours.    Assessment/Plan:      * Cardiac arrest  Lactic acidosis  Acute tubular necrosis  Hyperkalemia, diminished renal excretion  Ischemic hepatitis  Abnormal liver enzymes  Anoxic brain injury  Myoclonic jerking  Non-traumatic rhabdomyolysis  Leukocytosis  Monitor labs. Hypothermia protocol and reached target temperature of 33 C at 0355 this AM. Maintain at this temperature for 24 hours. Appreciate Pulmonology assistance. Wean FiO2 to prevent oxygen toxicity. Propofol for sedation. Levetiracetam 1000 mg BID, lorazepam prn for myoclonic jerking. Also given a loading dose of valproic acid. Electroencephalogram done today and negative for epileptic activity. Echocardiogram to evaluate cardiac function. IV fluids for rhabdomyolysis. Will check hepatitis panel with the elevated LFTs.     Chronic obstructive pulmonary disease  Chronic obstructive  pulmonary disease with acute exacerbation  Acute respiratory failure with hypoxia and hypercarbia  Uses albuterol nebulizer at home. Continue duonebs every 4 hours. Started on budesonide neb.  Give methylprednisolone 80 mg IV every 8 hours. Ventilator management. Appreciate Pulmonary assistance.     Essential hypertension  Takes hydrochlorothiazide, amlodipine at home. Hold for now. SBP ranged 90 to 261. Continue to monitor.     Cigarette nicotine dependence without complication  Quit last year.      VTE Risk Mitigation (From admission, onward)        Ordered     IP VTE HIGH RISK PATIENT  Once      03/28/19 2229     enoxaparin injection 40 mg  Daily      03/28/19 2229     Place sequential compression device  Until discontinued      03/28/19 1943          Critical care time spent on the evaluation and treatment of severe organ dysfunction, review of pertinent labs and imaging studies, discussions with consulting providers and discussions with patient/family: 45 minutes.    Rogelio Grant MD  Department of Hospital Medicine   [facility]

## 2019-03-29 NOTE — PLAN OF CARE
Plan of care note - Cardiology    Pt is a 62 yo M with normal LVEF, COPD by hx, recently went to other ER today, then was found down by family. (last seen 1630, EMS arrived about 90min later). Presenting rhythm was PEA. (no hx of VT/VF)  Sinus tachy after ACLS.    GCS is 3, no neurologic response. PH 7.0    1835: ecg NSR with subtile injury pattern in inferolateral leads: II,III, avf, and v4-6  Repeat ec: sinus with anterior Q waves, and hyperacute T wave c/w LAD injury/infarct, STelevation is no longer present      - Believe pt had respiratory arrest with no cardiac perfusion, Reperfusion likely has slow/stasis blood flow and resulting in cardiac injury.  Due to poor neurologic status, very poor invasive cath candidate.  - c/w medical mx for cardiac arrest, hypothermia protocol    Thank you for the opportunity to care for this patient. Will be available for questions if needed.     Blake Tapia MD  Interventional Cardiology  [facility]  Pager: [phone]

## 2019-03-29 NOTE — ED NOTES
Contacted ICU asking for attachment for arctic sun to connect rectal probe to arctic sun, ICU is looking for attachment. At this time, only able to obtain 1 core temperature

## 2019-03-29 NOTE — ED NOTES
Notified [facility] of pts GCS score of 3 and intubation status. Spoke with Samira Land;  Reference # [id number]. Per April, Organ coordinator will follow-up with night nurse.

## 2019-03-29 NOTE — PLAN OF CARE
Please not correction to HPI. Mr. Marie's daughter is 15 months old, not 15 years old. His fiancee has other older children who are not his biological children.

## 2019-03-29 NOTE — LOPA/MORA/SWTA/AOC/AEB
Thank you for this referral. At this time, Mr. Marie is a candidate for organ, tissue, and eye donation. Please call [facility] at [phone] if plans are made to perform any brain death testing, if plans are made to discuss withdrawal of mechanical support with his family, or if he has a change in status or is made a DNR.     Patricia Pascual, BSN, RN, CCRN  [facility]

## 2019-03-29 NOTE — ED NOTES
Patient appears to be having seizure like activity with full body jerking and head twitching while in CT. Also suctioned patient and noted some food like particles while suctioning patient.

## 2019-03-29 NOTE — ED NOTES
Unable to collect blood from central line. Phlebotomist at bedside for blood collection. Dr Aydin hall unable to use central line for blood collection.

## 2019-03-29 NOTE — ASSESSMENT & PLAN NOTE
Lactic acidosis  Acute tubular necrosis  Hyperkalemia, diminished renal excretion  Ischemic hepatitis  Anoxic brain injury  Myoclonic jerking  Non-traumatic rhabdomyolysis  Monitor labs. Hypothermia protocol. Consult Pulmonology for ventilator management. Wean FiO2 to prevent oxygen toxicity. Propofol for sedation. Levetiracetam, lorazepam prn for myoclonic jerking. Also give a loading dose of valproic acid. Electroencephalogram to evaluate for status epilepticus. Echocardiogram to evaluate cardiac function. IV fluids at 125 mL/hr for 8 hours then 100 mL/hr for 20 hours, reorder as necessary for rhabdomyolysis. NG tube was in bad position, removed. Will give medications IV and rectal for now. Treat underlying problem, COPD. Prognosis discussed with [place].

## 2019-03-29 NOTE — NURSING
[facility] representative called for update on pt. Update given. States someone will come by this AM for an initial screening of the pt.

## 2019-03-29 NOTE — HPI
Abhijit Marie is a 63 y.o. black man with hypertension, history of cigarette smoking (quit 9/28/18), and chronic obstructive pulmonary disease with history of ICU admission for exacerbation. He lives in [place], [place]. He has a fiancee with whom he has a 15 month old daughter named Shruthi. He works for [employer].   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 His oxygen saturation was 90% with tachypnea and blood pressure of 224/128. He was given albuterol, ipratropium, methylprednisolone, and hydralazine and discharged home after feeling somewhat better. He still had shortness of breath. When he went to work, he told his fiancee that he was having hot sweats. When he returned home, he felt weak. His fiancee went out to get him something to eat and drink around 16:30. When his fiancee returned home, she found him on the floor unresponsive. She could barely feel his pulse. EMS was called and found him to have pulseless electrical activity, and CPR was initiated at 17:56 pm. Pulse was regained at 18:11 pm. He had 4 doses of epinephrine and 1 dose of sodium bicarbonate given via interosseous line, and 100 cc of saliva/vomitues was suctioned from his airway. He was intubated. In the emergency department, he was found to have lactic acidosis, hypercapnia, acute kidney injury, acute hepatic injury, rhabdomyolysis. He had no neurologic responses. He was admitted to [facility]. He displayed myoclonic activity.

## 2019-03-29 NOTE — PLAN OF CARE
Problem: Adult Inpatient Plan of Care  Goal: Plan of Care Review  Outcome: Ongoing (interventions implemented as appropriate)  Pt. Received on vent with settings on the flow sheet. Pt. Sleeping in no apparent distress.  Vent check ok, alarms working and are audible. Vent volumes ok.

## 2019-03-29 NOTE — CONSULTS
U Pulmonology Consult - Resident Note    Primary Attending Physician: Dr. Perez  Primary Team: [facility]   Consultant Attending: Dr. Juli Bess  Consultant Resident: Dahiana Perez    Reason for Consult:     Cardiac Arrest    Subjective:      History of Present Illness:  Abhijit Marie is a 63 y.o.  male who  has a past medical history of COPD (chronic obstructive pulmonary disease), Diastolic dysfunction (10/15/2018), Hypertension, and Reactive airway disease (12/2/2014).. The patient presented to the [facility] on 3/28/2019 with a primary complaint of Cardiac Arrest (found unresponsive by family members. last seen well at 1630, history of COPD)    Patient intubated and sedated, hx obtained from chart review. On the morning of admission, patient presented to the ED for SOB likely 2/2 COPD exacerbation and HTN with /128. Patient was given nebulizer breathing treatments, steroids, and 1x Hydralazine IV. After this treatment, he stated he felt better and was discharged from the ED.     Per fizina who lives with patient, throughout the day patient was complaining of felling hot, sweaty and weak. At around 4:30pm the fiance left the house to run errands. Patient returned later to find patient unresponsive on the floor and called EMS. On EMS arrival patient was found to be in PEA cardiac arrest and compressions were initiated at 5:56pm per EMS charting. Patient received Epi x4, Bicarb x1 with ROSC achieved at 6:11pm. Patient was brought to [facility] ED where he was intubated. Cooling protocol was initiated at 9:30pm on day of admission.    Past Medical History:  Past Medical History:   Diagnosis Date    COPD (chronic obstructive pulmonary disease)     Diastolic dysfunction 10/15/2018    Hypertension     Reactive airway disease 12/2/2014       Past Surgical History:  History reviewed. No pertinent surgical history.    Allergies:  Review of patient's allergies indicates:  No  Known Allergies    Medications:   In-Hospital Scheduled Medications:   albuterol-ipratropium  3 mL Nebulization Q4H    budesonide  0.5 mg Nebulization Q12H    chlorhexidine  15 mL Mouth/Throat BID    enoxaparin  40 mg Subcutaneous Daily    famotidine (PF)  20 mg Intravenous BID    levetiracetam IVPB  1,000 mg Intravenous Q12H    methylPREDNISolone sodium succinate  80 mg Intravenous Q8H      In-Hospital IV Infusion Medications:   sodium chloride 0.9% 125 mL/hr at 03/29/19 1015    propofol 10 mcg/kg/min (03/29/19 1203)      Home Medications:  Prior to Admission medications    Medication Sig Start Date End Date Taking? Authorizing Provider   albuterol (PROVENTIL) 2.5 mg /3 mL (0.083 %) nebulizer solution Take 3 mLs (2.5 mg total) by nebulization every 4 (four) hours as needed for Wheezing or Shortness of Breath. Rescue 3/28/19 4/27/19  Brandt Francis MD   albuterol (PROVENTIL/VENTOLIN HFA) 90 mcg/actuation inhaler Inhale 1-2 puffs into the lungs every 4 (four) hours as needed for Wheezing or Shortness of Breath. 3/28/19 3/27/20  Brandt Francis MD   amLODIPine (NORVASC) 10 MG tablet Take 1 tablet (10 mg total) by mouth once daily. 3/28/19 4/27/19  Brandt Francis MD   fluticasone-vilanterol (BREO) 200-25 mcg/dose DsDv diskus inhaler Inhale 1 puff into the lungs once daily. Controller 10/18/18 10/18/19  Chai Mann MD   hydroCHLOROthiazide (HYDRODIURIL) 25 MG tablet Take 1 tablet (25 mg total) by mouth once daily. 3/28/19 4/27/19  Brandt Francis MD   naproxen (NAPROSYN) 500 MG tablet Take 1 tablet (500 mg total) by mouth 2 (two) times daily with meals. 3/2/19   Tonjathiago Thompson PA-C   nebulizer and compressor [place] use as directed 10/18/18   Chai Mnan MD   nicotine (NICODERM CQ) 21 mg/24 hr Place 1 patch onto the skin once daily. No prescription needed. 10/18/18   Chai Mann MD       Family History:  Family History   Problem Relation Age of Onset    Heart disease Mother     Heart disease  "Father        Social History:  Social History     Tobacco Use    Smoking status: Former Smoker     Packs/day: 1.00     Years: 40.00     Pack years: 40.00     Types: Cigarettes     Last attempt to quit: 2018     Years since quittin.4    Smokeless tobacco: Never Used   Substance Use Topics    Alcohol use: No    Drug use: No     Types: Marijuana       Review of Systems:  Could not obtain as patient is intubated and sedated       Objective:   Physical Examination:  Last 24 Hour Vital Signs:  BP  Min: 90/63  Max: 261/153  Temp  Av.8 °F (33.2 °C)  Min: 90.5 °F (32.5 °C)  Max: 96.4 °F (35.8 °C)  Pulse  Av  Min: 67  Max: 105  Resp  Av.8  Min: 2  Max: 31  SpO2  Av.4 %  Min: 97 %  Max: 100 %  Height  Av' 3" (190.5 cm)  Min: 6' 2" (188 cm)  Max: 6' 4" (193 cm)  Weight  Av.4 kg (201 lb 8.4 oz)  Min: 90.7 kg (200 lb)  Max: 92.1 kg (203 lb 0.7 oz)  Body mass index is 24.72 kg/m².  I/O last 3 completed shifts:  In: 1061.2 [I.V.:961.2; IV Piggyback:100]  Out: 850 [Urine:650; Drains:200]    General: Intubated, sedated  Head:  Normocephalic and atraumatic  Eyes:  PERRL; EOMi with anicteric sclera and clear conjunctivae  Mouth:  Oropharynx clear and without exudate; moist mucous membranes  Cardio:  Regular rate and rhythm with normal S1 and S2; no murmurs or rubs  Resp:  Intubated. Diffuse wheezing with prolonged expiratory phase  Abdom: Soft, NTND with normoactive bowel sounds  Extrem: Warm and well-perfused with no clubbing, cyanosis or edema  Skin:  No rashes, lesions, or color changes  Pulses: 2+ and symmetric distally  Neuro:  Twitching of upper lip and facial muscles, intubated/sedated unable to perform full neuro exam    Laboratory Results:  Most Recent Data:  CBC:   Lab Results   Component Value Date    WBC 21.86 (H) 2019    HGB 14.2 2019    HCT 45.1 2019     2019    MCV 90 2019    RDW 14.4 2019     BMP:   Lab Results   Component Value Date    "  03/29/2019    K 4.0 03/29/2019    K 4.0 03/29/2019     03/29/2019    CO2 21 (L) 03/29/2019    BUN 31 (H) 03/29/2019     (H) 03/29/2019     (H) 03/29/2019    CALCIUM 8.8 03/29/2019    MG 3.3 (H) 03/29/2019    PHOS 4.7 (H) 03/29/2019     LFTs:   Lab Results   Component Value Date    PROT 7.0 03/29/2019    ALBUMIN 3.7 03/29/2019    BILITOT 0.3 03/29/2019     (H) 03/29/2019    ALKPHOS 68 03/29/2019     (H) 03/29/2019     Coags:   Lab Results   Component Value Date    INR 1.0 03/29/2019     Cardiac:   Lab Results   Component Value Date    TROPONINI 0.021 03/29/2019     (H) 03/28/2019       Other Results:  EKG (my interpretation): Normal sinus rhythm, possible peaking of T-waves    Radiology:  X-ray Chest 1 View  3/28/2019  The endotracheal tube tip projects in the midthoracic trachea. Lungs are clear. No left effusion.  The right costophrenic angle is excluded from the field of view. No pneumothorax. No acute bone abnormality.      Assessment and Plan:     63M with a medical hx of HTN and COPD (no PFT's) presenters to ED by EMS after being found in PEA arrest. Patient last seen normal at 4:30pm and EMS initiated CPR at 5:56pm. Unknown how long patient was in cardiac arrest. Patient coded for approximately 15min with ROSC (Epi x4, Bicarb x1), however was found down on the ground and unknown.  Cardiac arrest may be due to hypercapnic and hypoxic respiratory failure as he was treated that morning for COPD exacerbation.    Acute Hypoxic Hypercapnic Respiratory Failure  - Patient with hx of COPD, likely causing hypercapnia  - Initial ABG with respiratory acidosis: 7.083 / 84 / 160, measure HCO3- 23  - Patient intubated in ED, cooling protocol initiated   - Continue on COPD treatment with scheduled Duonebs, ICS/Laba, methylprednisolone  - Currently intubated, will attempt SBT when appropriate when cooling protocol is complete    Cardiac Arrest  - Likely due to hypercapnia and  acidemia  - Patient found in PEA arrest by EMS, unknown how long patient in cardiac arrest.  -  ROSC achieved 15min after CPR initiated  - On exam, facial twitching noted  - Recommend EEG and neurology consult  - Cooling protocol initiated 3/28/19 at 9:30pm, will reassess patient after re-warmed and sedation       Fernando Perez MD  [facility]  [facility]

## 2019-03-29 NOTE — ED NOTES
Joy, patients fiance, [phone], left contact information and would like to be notified of any changes. MsSuzy Loretta, patients mother, [phone] is an alternate number for contact.

## 2019-03-29 NOTE — ED NOTES
Unable to verify NG tube with air bolus auscultation. Pulled NG tube from patient. Unable to insert new NG or OG tube at this time due to continued seizure like activity.

## 2019-03-30 PROBLEM — E87.5 HYPERKALEMIA, DIMINISHED RENAL EXCRETION: Status: RESOLVED | Noted: 2019-01-01 | Resolved: 2019-01-01

## 2019-03-30 PROBLEM — K92.2 UPPER GI BLEEDING: Status: ACTIVE | Noted: 2019-01-01

## 2019-03-30 NOTE — PLAN OF CARE
Problem: Adult Inpatient Plan of Care  Goal: Plan of Care Review  Outcome: Ongoing (interventions implemented as appropriate)  Pt reached rewarming goal at 1200 today. Now controlled at 36.5 C. Pt has no cough or gag. R/L pupil fixed. Afebrile. Pt is still putting out bloody secretions from og tube. Dr. Grant made aware of. Was instructed to draw a hemaglobin. Hemaglobin resulted as 12.4. Levo has been titrated off. LAST /66 MAP of 79. Pt still on propofol at 30.  Family visited at bedside. Safety maintained. Continuing to monitor.

## 2019-03-30 NOTE — ASSESSMENT & PLAN NOTE
Takes hydrochlorothiazide, amlodipine at home. Hold for now. SBP ranged 69 to 152. Continue to monitor.

## 2019-03-30 NOTE — ASSESSMENT & PLAN NOTE
Potential trauma related to OG tube. Start on PPI IV BID. Hgb is generally stable at 12. Continue to monitor. Hold lovenox.

## 2019-03-30 NOTE — PLAN OF CARE
RN notified eICU RN during telemonitoring rounds that pt's urine output is low. eICU RN states she will notify Dr. Negrete. Awaiting orders.

## 2019-03-30 NOTE — ASSESSMENT & PLAN NOTE
Lactic acidosis  Acute tubular necrosis  Hyperkalemia, diminished renal excretion - Resolved  Ischemic hepatitis  Abnormal liver enzymes  Anoxic brain injury  Myoclonic jerking  Non-traumatic rhabdomyolysis  Leukocytosis  Monitor labs. Hypothermia protocol and reached target temperature of 33 C at 0355 on 3/29 and maintained for 24 hours. Rewarming started this AM. Appreciate Pulmonology assistance. Wean FiO2 to prevent oxygen toxicity. Propofol for sedation. Levetiracetam 1000 mg BID, lorazepam prn for myoclonic jerking. Electroencephalogram done 3/29/19 and negative for epileptic activity. Echocardiogram read pending to evaluate cardiac function. CK improving. Stop IV fluids. Hepatitis panel pending. AST/ALT improving.

## 2019-03-30 NOTE — PROGRESS NOTES
[facility] Medicine  Progress Note    Patient Name: Abhijit Marie  MRN: [id number]  Patient Class: IP- Inpatient   Admission Date: 3/28/2019  Length of Stay: 2 days  Attending Physician: Rogelio Grant MD  Primary Care Provider: Primary Doctor No        Subjective:     Principal Problem:Cardiac arrest    HPI:  Abhjiit Marie is a 63 y.o. black man with hypertension, history of cigarette smoking (quit 9/28/18), and chronic obstructive pulmonary disease with history of ICU admission for exacerbation. He lives in [place], [place]. He has a fiancee with whom he has a 15 month old daughter named Shruthi. He works for [employer].   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 His oxygen saturation was 90% with tachypnea and blood pressure of 224/128. He was given albuterol, ipratropium, methylprednisolone, and hydralazine and discharged home after feeling somewhat better. He still had shortness of breath. When he went to work, he told his fiancee that he was having hot sweats. When he returned home, he felt weak. His fiancee went out to get him something to eat and drink around 16:30. When his fiancee returned home, she found him on the floor unresponsive. She could barely feel his pulse. EMS was called and found him to have pulseless electrical activity, and CPR was initiated at 17:56 pm. Pulse was regained at 18:11 pm. He had 4 doses of epinephrine and 1 dose of sodium bicarbonate given via interosseous line, and 100 cc of saliva/vomitues was suctioned from his airway. He was intubated. In the emergency department, he was found to have lactic acidosis, hypercapnia, acute kidney injury, acute hepatic injury, rhabdomyolysis. He had no neurologic responses. He was admitted to [facility]. He displayed myoclonic activity.     [facility]  "Course:  Started on TTM in the ED and reached goal temperature of < 33 C around 0400 on 3/29. He was maintained at that temperature for 24 hours prior to starting the rewarming process. EEG performed and showed "a burst suppressed background with generalized background suppressions lasting up to 5 min.  This is consistent with a severe encephalopathy.  This finding is nonspecific with regards to etiology but can be seen in the setting of toxic/metabolic derangements, infection, as a medication effect, and in anoxia.  There are no epileptiform discharges and no electrographic seizures."     Interval History: Started rewarming early this AM. Still unresponsive with occasional sucking action on the ETT. Not breathing over the vent. When sedation is stopped this AM, he had increased facial twitching but no movement in the extremities. The facial twitching stops after restarting the sedation.     Review of Systems   Unable to perform ROS: Intubated     Objective:     Vital Signs (Most Recent):  Temp: (!) 95.2 °F (35.1 °C) (03/30/19 0650)  Pulse: 90 (03/30/19 0650)  Resp: (!) 30 (03/30/19 0650)  BP: (!) 103/59 (03/30/19 0650)  SpO2: 99 % (03/30/19 0650) Vital Signs (24h Range):  Temp:  [89.8 °F (32.1 °C)-95.2 °F (35.1 °C)] 95.2 °F (35.1 °C)  Pulse:  [] 90  Resp:  [29-31] 30  SpO2:  [98 %-100 %] 99 %  BP: ()/(46-88) 103/59     Weight: 98.1 kg (216 lb 4.3 oz)  Body mass index is 26.33 kg/m².    Intake/Output Summary (Last 24 hours) at 3/30/2019 0947  Last data filed at 3/30/2019 0800  Gross per 24 hour   Intake 4475.62 ml   Output 747 ml   Net 3728.62 ml      Physical Exam   Constitutional: He appears well-developed and well-nourished.   HENT:   Head: Normocephalic and atraumatic.   OGT/ETT in place   Eyes: Pupils are equal, round, and reactive to light.   Cardiovascular: Normal rate and regular rhythm.   No murmur heard.  Pulmonary/Chest: Effort normal and breath sounds normal. No respiratory distress. "   Abdominal: Soft. Bowel sounds are normal. He exhibits no distension. There is no tenderness.   Musculoskeletal: He exhibits no edema or tenderness.   Neurological: He is unresponsive.   Skin: Skin is warm and dry.   Nursing note and vitals reviewed.      Significant Labs:   ABGs:   Recent Labs   Lab 03/29/19 2052   PH 7.259*   PCO2 41.2   HCO3 18.5*   POCSATURATED 98   BE -9     CBC:   Recent Labs   Lab 03/28/19 1838 03/29/19 0430 03/30/19 0526   WBC 9.48 21.86* 17.63*   HGB 12.6* 14.2 12.3*   HCT 40.2 45.1 38.4*    242 212     CMP:   Recent Labs   Lab 03/28/19 1838 03/29/19 0430 03/30/19 0103 03/30/19 0526 03/30/19  0759      < > 143   < > 142 144 140   K 5.2*   < > 4.2  4.2   < > 3.7 3.7 3.7      < > 106   < > 111* 113* 110   CO2 18*   < > 22*   < > 18* 20* 21*   *   < > 143*  143*   < > 176*  176* 160*  160* 147*  147*   BUN 16   < > 24*   < > 37* 40* 41*   CREATININE 1.5*   < > 1.2   < > 1.5* 1.5* 1.5*   CALCIUM 8.8   < > 8.9   < > 8.1* 8.2* 8.2*   PROT 5.8*  --  7.0  --   --  5.8*  --    ALBUMIN 3.2*  --  3.7  --   --  3.0*  --    BILITOT 0.4  --  0.3  --   --  0.2  --    ALKPHOS 59  --  68  --   --  54*  --    *  --  153*  --   --  77*  --    *  --  209*  --   --  132*  --    ANIONGAP 22*   < > 15   < > 13 11 9   EGFRNONAA 49*   < > >60   < > 49* 49* 49*    < > = values in this interval not displayed.     Coagulation:   Recent Labs   Lab 03/30/19 0526   INR 1.0   APTT 32.3*     Lactic Acid:   Recent Labs   Lab 03/29/19  0430 03/29/19  1614 03/30/19  0526   LACTATE 5.7* 7.2* 3.6*     Magnesium:   Recent Labs   Lab 03/30/19  0103 03/30/19  0526 03/30/19  0759   MG 4.0* 3.6* 3.6*     POCT Glucose:   Recent Labs   Lab 03/28/19  1837 03/28/19  2259 03/29/19  0419   POCTGLUCOSE 290* 239* 151*     Troponin:   Recent Labs   Lab 03/28/19  1938 03/28/19  2325 03/29/19  0430   TROPONINI <0.006 0.020 0.021       Significant Imaging: I have reviewed all pertinent  imaging results/findings within the past 24 hours.    Assessment/Plan:      * Cardiac arrest  Lactic acidosis  Acute tubular necrosis  Hyperkalemia, diminished renal excretion - Resolved  Ischemic hepatitis  Abnormal liver enzymes  Anoxic brain injury  Myoclonic jerking  Non-traumatic rhabdomyolysis  Leukocytosis  Monitor labs. Hypothermia protocol and reached target temperature of 33 C at 0355 on 3/29 and maintained for 24 hours. Rewarming started this AM. Appreciate Pulmonology assistance. Wean FiO2 to prevent oxygen toxicity. Propofol for sedation. Levetiracetam 1000 mg BID, lorazepam prn for myoclonic jerking. Electroencephalogram done 3/29/19 and negative for epileptic activity. Echocardiogram read pending to evaluate cardiac function. CK improving. Stop IV fluids. Hepatitis panel pending. AST/ALT improving.     Chronic obstructive pulmonary disease  Chronic obstructive pulmonary disease with acute exacerbation  Acute respiratory failure with hypoxia and hypercarbia  Uses albuterol nebulizer at home. Continue duonebs every 4 hours. Started on budesonide neb.  Give methylprednisolone 80 mg IV every 8 hours. Ventilator management. Appreciate Pulmonary assistance.     Upper GI bleeding  Potential trauma related to OG tube. Start on PPI IV BID. Hgb is generally stable at 12. Continue to monitor. Hold lovenox.     Essential hypertension  Takes hydrochlorothiazide, amlodipine at home. Hold for now. SBP ranged 69 to 152. Continue to monitor.     Cigarette nicotine dependence without complication  Quit last year.      VTE Risk Mitigation (From admission, onward)        Ordered     IP VTE HIGH RISK PATIENT  Once      03/28/19 2229     Place sequential compression device  Until discontinued      03/28/19 1943          Critical care time spent on the evaluation and treatment of severe organ dysfunction, review of pertinent labs and imaging studies, discussions with consulting providers and discussions with patient/family:  40 minutes.    Rogelio Grant MD  Department of Hospital Medicine   [facility]

## 2019-03-30 NOTE — HOSPITAL COURSE
"He was put on therapeutic hypothermia and reached goal temperature of < 33° C around 04:00 on 3/29/19. He was maintained at that temperature for 24 hours prior to starting the rewarming process. EEG showed "a burst suppressed background with generalized background suppressions lasting up to 5 min ... consistent with a severe encephalopathy" with no epileptiform activity, consistent with anoxic brain injury. Repeat CT head showed "diffuse cerebral edema noting loss of gray-white matter differentiation and progressive loss of sulcation." EEG on 4/1/19 showed that the "overall degree of suppression with minimal variability and lack of reactivity is suggestive of a severe encephalopathy with guarded prognosis" with "no evidence of an epileptic process on this recording. Episodes of head jerking were not associated with any abnormality on EEG." Propofol was discontinued and he was started on dexmedetomidine for agitation to avoid the respiratory and neurologic suppression. Respiratory viral panel was positive for Enterovirus and Rhinovirus. Respiratory culture had no growth. Head CT showed diffuse cerebral edema so he was put on dexamethasone for a few days. Palliative Care had a family discussion on 4/5/19 and it was decided that life support would be withdrawn. He was transferred to inpatient hospice.  "
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
## 2019-03-30 NOTE — PROGRESS NOTES
[facility]  Critical Care - Medicine  Progress Note    Patient Name: Abhijit Marie  MRN: [id number]  Admission Date: 3/28/2019  Hospital Length of Stay: 2 days  Code Status: Full Code  Attending Provider: Rogelio Grant MD  Primary Care Provider: Primary Doctor No   Principal Problem: Cardiac arrest    Subjective:     Interval history:  63 male with HTN, HFpEF, COPD, who is currently admitted to the MICU following a hypoxemic/hypercarbic PEA arrest that was unwitnessed.  He was last normal 3/28 at 4:30pm and CPR was started at 5:56PM the same day.  Exact downtime is unknown.  He was admitted to the MICU and underwent cooling protocol, and is currently being rewarmed.  He is on keppra for anti-epileptic therapy (did have EEG which was negative for active seizures).  He is having evidence of probable myclonus activity manifested by facial twitching.  He is on propofol for sedation.  He is on levophed to maintain BP in setting of propofol.  He initially had a respiratory acidosis that has since corrected with mechanical ventilation.  He is not responding to noxious stimuli.  He completed rewarming this AM and is approximately 36 hours post presentation when seen.  Awaiting 72 hours for prognostication.  Initial CT head on 3/28 without acute abnormality.      Review of Systems   Unable to perform ROS: Intubated     Objective:     Vital Signs (Most Recent):  Temp: 97 °F (36.1 °C) (03/30/19 1102)  Pulse: 97 (03/30/19 1102)  Resp: (!) 30 (03/30/19 1102)  BP: 109/72 (03/30/19 1000)  SpO2: 97 % (03/30/19 1102) Vital Signs (24h Range):  Temp:  [89.8 °F (32.1 °C)-97 °F (36.1 °C)] 97 °F (36.1 °C)  Pulse:  [] 97  Resp:  [29-31] 30  SpO2:  [96 %-100 %] 97 %  BP: ()/(46-88) 109/72     Weight: 98.1 kg (216 lb 4.3 oz)  Body mass index is 26.33 kg/m².      Intake/Output Summary (Last 24 hours) at 3/30/2019 1129  Last data filed at 3/30/2019 1000  Gross per 24 hour   Intake 4779.36 ml   Output 774  ml   Net 4005.36 ml       Physical Exam   Constitutional: He appears well-developed and well-nourished.   Appears younger than age.  Sedated, repetitive movements of face, mainly lips and jaw.   HENT:   Head: Normocephalic and atraumatic.   Mouth/Throat: No oropharyngeal exudate.   Eyes: No scleral icterus.   Pin point pupils.   Neck: Normal range of motion. Neck supple. No JVD present.   Cardiovascular: Normal rate, regular rhythm, normal heart sounds and intact distal pulses. Exam reveals no gallop and no friction rub.   No murmur heard.  Pulmonary/Chest: No respiratory distress. He has no wheezes. He has no rales.   Mechanical ventilation   Abdominal: Soft. Bowel sounds are normal. He exhibits no distension.   Musculoskeletal: He exhibits edema.   Lymphadenopathy:     He has no cervical adenopathy.   Neurological:   Sedated, no response to noxious stimuli     Skin: Skin is warm and dry. Capillary refill takes less than 2 seconds.       Vents:  Vent Mode: A/C (03/30/19 1102)  Ventilator Initiated: Yes (03/28/19 2001)  Set Rate: 30 bmp (03/30/19 1102)  Vt Set: 520 mL (03/30/19 1102)  PEEP/CPAP: 5 cmH20 (03/30/19 1102)  Oxygen Concentration (%): 30 (03/30/19 1102)  Peak Airway Pressure: 43 cmH2O (03/30/19 1102)  Total Ve: 16 mL (03/30/19 1102)  F/VT Ratio<105 (RSBI): (!) 56.07 (03/30/19 1102)    Lines/Drains/Airways     Central Venous Catheter Line                 Percutaneous Central Line Insertion/Assessment - triple lumen  03/28/19 1900 left femoral vein 1 day          Drain                 NG/OG Tube 03/29/19 0346 orogastric 18 Fr. Right mouth 1 day         Urethral Catheter 03/28/19 1844 Temperature probe 16 Fr. 1 day          Airway                 Airway - Non-Surgical 03/28/19 1835 Endotracheal Tube 1 day          Peripheral Intravenous Line                 Peripheral IV - Single Lumen 03/28/19 1839 Left Antecubital 1 day         Peripheral IV - Single Lumen 03/28/19 1839 Left Hand 1 day                 Significant Labs:    CBC/Anemia Profile:  Recent Labs   Lab 03/28/19  1838 03/29/19 0430 03/30/19  0526   WBC 9.48 21.86* 17.63*   HGB 12.6* 14.2 12.3*   HCT 40.2 45.1 38.4*    242 212   MCV 90 90 87   RDW 14.2 14.4 14.8*        Chemistries:  Recent Labs   Lab 03/28/19  1838  03/29/19 0430  03/30/19  0103 03/30/19  0526 03/30/19  0759      < > 143   < > 142 144 140   K 5.2*   < > 4.2  4.2   < > 3.7 3.7 3.7      < > 106   < > 111* 113* 110   CO2 18*   < > 22*   < > 18* 20* 21*   BUN 16   < > 24*   < > 37* 40* 41*   CREATININE 1.5*   < > 1.2   < > 1.5* 1.5* 1.5*   CALCIUM 8.8   < > 8.9   < > 8.1* 8.2* 8.2*   ALBUMIN 3.2*  --  3.7  --   --  3.0*  --    PROT 5.8*  --  7.0  --   --  5.8*  --    BILITOT 0.4  --  0.3  --   --  0.2  --    ALKPHOS 59  --  68  --   --  54*  --    *  --  209*  --   --  132*  --    *  --  153*  --   --  77*  --    MG  --    < >  --    < > 4.0* 3.6* 3.6*   PHOS  --    < >  --    < > 3.7 2.9 2.7    < > = values in this interval not displayed.       All pertinent labs within the past 24 hours have been reviewed.    Significant Imaging:  I have reviewed all pertinent imaging results/findings within the past 24 hours.    Assessment/Plan:     Active Diagnoses:    Diagnosis Date Noted POA    PRINCIPAL PROBLEM:  Cardiac arrest [I46.9] 03/28/2019 Yes    Upper GI bleeding [K92.2] 03/30/2019 Yes    Abnormal liver enzymes [R74.8] 03/29/2019 Yes    Leukocytosis [D72.829] 03/29/2019 Yes    Acute tubular necrosis [N17.0] 03/28/2019 Yes    Ischemic hepatitis [K75.9] 03/28/2019 Yes    Lactic acidosis [E87.2] 03/28/2019 Yes    Acute respiratory failure with hypoxia and hypercarbia [J96.01, J96.02] 03/28/2019 Yes    Anoxic brain injury [G93.1] 03/28/2019 Yes    Non-traumatic rhabdomyolysis [M62.82] 03/28/2019 Yes    Myoclonic jerking [G25.3] 03/28/2019 Yes    Essential hypertension [I10] 12/13/2015 Yes     Chronic    Chronic obstructive pulmonary disease with  acute exacerbation [J44.1] 12/13/2015 Yes    Chronic obstructive pulmonary disease [J44.9] 12/02/2014 Yes     Chronic    Cigarette nicotine dependence without complication [F17.210] 12/02/2014 Yes     Chronic      Problems Resolved During this Admission:    Diagnosis Date Noted Date Resolved POA    Hyperkalemia, diminished renal excretion [E87.5] 03/28/2019 03/30/2019 Yes       1) Acute hypoxemic/hypercarbic respiratory failure and PEA Arrest  - now rewarming  - needs CT head repeat at 72 hours  - continue mechanical ventilation with hyperventilation.  ABG now improved and normalized.  - mental status will preclude extubation at this point.  - needs daily ABGs.    2) HFpEF  - currently on pressors  - needs good HR regimen if improvement occurs.    3) COPD  - continue duonebs q4hr for now, wheezing noted still on exam.  - continue budesonide neb for now  - continue solumedrol for now 80mg Q8hr    4) concern for anoxic brain injury   - repeat CT heat at 72 hours.    5) Upper GI bleed  - agree with PPI BID  - if HGB changes or drops, may benefit from GI consultation.    6) hypotension  - likely secondary to cooling, propofol. Continue to watch  - wean levophed as tolerated.  - WBC downtrending, but if febrile or WBC increases, would reculture and start broad spectrum abx immediately.      Darius Huerta MD  Critical Care - Medicine  [facility]  [phone]

## 2019-03-30 NOTE — SUBJECTIVE & OBJECTIVE
Interval History: Started rewarming early this AM. Still unresponsive with occasional sucking action on the ETT. Not breathing over the vent. When sedation is stopped this AM, he had increased facial twitching but no movement in the extremities. The facial twitching stops after restarting the sedation.     Review of Systems   Unable to perform ROS: Intubated     Objective:     Vital Signs (Most Recent):  Temp: (!) 95.2 °F (35.1 °C) (03/30/19 0650)  Pulse: 90 (03/30/19 0650)  Resp: (!) 30 (03/30/19 0650)  BP: (!) 103/59 (03/30/19 0650)  SpO2: 99 % (03/30/19 0650) Vital Signs (24h Range):  Temp:  [89.8 °F (32.1 °C)-95.2 °F (35.1 °C)] 95.2 °F (35.1 °C)  Pulse:  [] 90  Resp:  [29-31] 30  SpO2:  [98 %-100 %] 99 %  BP: ()/(46-88) 103/59     Weight: 98.1 kg (216 lb 4.3 oz)  Body mass index is 26.33 kg/m².    Intake/Output Summary (Last 24 hours) at 3/30/2019 0947  Last data filed at 3/30/2019 0800  Gross per 24 hour   Intake 4475.62 ml   Output 747 ml   Net 3728.62 ml      Physical Exam   Constitutional: He appears well-developed and well-nourished.   HENT:   Head: Normocephalic and atraumatic.   OGT/ETT in place   Eyes: Pupils are equal, round, and reactive to light.   Cardiovascular: Normal rate and regular rhythm.   No murmur heard.  Pulmonary/Chest: Effort normal and breath sounds normal. No respiratory distress.   Abdominal: Soft. Bowel sounds are normal. He exhibits no distension. There is no tenderness.   Musculoskeletal: He exhibits no edema or tenderness.   Neurological: He is unresponsive.   Skin: Skin is warm and dry.   Nursing note and vitals reviewed.      Significant Labs:   ABGs:   Recent Labs   Lab 03/29/19 2052   PH 7.259*   PCO2 41.2   HCO3 18.5*   POCSATURATED 98   BE -9     CBC:   Recent Labs   Lab 03/28/19 1838 03/29/19  0430 03/30/19  0526   WBC 9.48 21.86* 17.63*   HGB 12.6* 14.2 12.3*   HCT 40.2 45.1 38.4*    242 212     CMP:   Recent Labs   Lab 03/28/19 1838 03/29/19  0430   03/30/19  0103 03/30/19  0526 03/30/19  0759      < > 143   < > 142 144 140   K 5.2*   < > 4.2  4.2   < > 3.7 3.7 3.7      < > 106   < > 111* 113* 110   CO2 18*   < > 22*   < > 18* 20* 21*   *   < > 143*  143*   < > 176*  176* 160*  160* 147*  147*   BUN 16   < > 24*   < > 37* 40* 41*   CREATININE 1.5*   < > 1.2   < > 1.5* 1.5* 1.5*   CALCIUM 8.8   < > 8.9   < > 8.1* 8.2* 8.2*   PROT 5.8*  --  7.0  --   --  5.8*  --    ALBUMIN 3.2*  --  3.7  --   --  3.0*  --    BILITOT 0.4  --  0.3  --   --  0.2  --    ALKPHOS 59  --  68  --   --  54*  --    *  --  153*  --   --  77*  --    *  --  209*  --   --  132*  --    ANIONGAP 22*   < > 15   < > 13 11 9   EGFRNONAA 49*   < > >60   < > 49* 49* 49*    < > = values in this interval not displayed.     Coagulation:   Recent Labs   Lab 03/30/19  0526   INR 1.0   APTT 32.3*     Lactic Acid:   Recent Labs   Lab 03/29/19  0430 03/29/19  1614 03/30/19  0526   LACTATE 5.7* 7.2* 3.6*     Magnesium:   Recent Labs   Lab 03/30/19  0103 03/30/19  0526 03/30/19  0759   MG 4.0* 3.6* 3.6*     POCT Glucose:   Recent Labs   Lab 03/28/19  1837 03/28/19  2259 03/29/19  0419   POCTGLUCOSE 290* 239* 151*     Troponin:   Recent Labs   Lab 03/28/19  1938 03/28/19  2325 03/29/19  0430   TROPONINI <0.006 0.020 0.021       Significant Imaging: I have reviewed all pertinent imaging results/findings within the past 24 hours.

## 2019-03-30 NOTE — PLAN OF CARE
"Pt's rhythm suddenly changed from NSR in 80's to a tachy rhythm up to high 120's. Montior states "SV tachy." Rhythm looks to be sinus tach but EKG obtained for verification. Dr. Negrete with [facility] notified. EKG states sinus tach with nonspecific ST abnormality. Will continue to monitor.  "

## 2019-03-31 NOTE — ASSESSMENT & PLAN NOTE
Potential trauma related to OG tube. Continue PPI IV BID. Hgb remains stable around 12. Continue to monitor. Hold lovenox.

## 2019-03-31 NOTE — ASSESSMENT & PLAN NOTE
Lactic acidosis  Acute tubular necrosis  Hyperkalemia, diminished renal excretion - Resolved  Ischemic hepatitis  Abnormal liver enzymes  Anoxic brain injury  Myoclonic jerking  Non-traumatic rhabdomyolysis  Leukocytosis  Monitor labs. Hypothermia protocol and reached target temperature of 33 C at 0355 on 3/29 and maintained for 24 hours. Rewarmed 3/30 and now maintaining normothermia. Currently with corneal reflexes and is breathing over the vent, but no purposeful movement off the propofol. Continue to hold the propofol for now unless starts having myoclonic activity again. Appreciate Pulmonology assistance. Wean FiO2 to prevent oxygen toxicity. Levetiracetam 1000 mg BID, lorazepam prn for myoclonic jerking. Electroencephalogram done 3/29/19 and negative for epileptic activity. Echocardiogram read pending to evaluate cardiac function. Hepatitis panel pending. AST/ALT improving. Repeat CT head overnight. Repeat EEG tomorrow to ensure no NCSE.

## 2019-03-31 NOTE — ASSESSMENT & PLAN NOTE
Takes hydrochlorothiazide, amlodipine at home. Hold for now. SBP ranged 91 to 124. Continue to monitor.Hydalazine prn.

## 2019-03-31 NOTE — PROGRESS NOTES
[facility]  Critical Care - Medicine  Progress Note    Patient Name: Abhijit Marie  MRN: [id number]  Admission Date: 3/28/2019  Hospital Length of Stay: 3 days  Code Status: Full Code  Attending Provider: Rogelio Grant MD  Primary Care Provider: Primary Doctor No   Principal Problem: Cardiac arrest    Subjective:     Interval history:  63 male with HTN, HFpEF, COPD, who is currently admitted to the MICU following a hypoxemic/hypercarbic PEA arrest that was unwitnessed.  He was last normal 3/28 at 4:30pm and CPR was started at 5:56PM the same day.  Exact downtime is unknown.  He was admitted to the MICU and underwent cooling protocol, and is currently being rewarmed.  He is on keppra for anti-epileptic therapy (did have EEG which was negative for active seizures).  He is having evidence of probable myclonus activity manifested by facial twitching.  He is on propofol for sedation. He initially had a respiratory acidosis that has since corrected with mechanical ventilation.  He is not responding to noxious stimuli.  He completed rewarming the AM of 8/30. 72 hours post initial head CT will be 8/31 at 8pm.  Awaiting 72 hours for prognostication.  Initial CT head on 3/28 without acute abnormality.    Interval History:  This AM patient had increased Peak pressures of ~60 and marked wheezing.  Plateau pressures were 53, indicative of lower airway issues.  The ventilator also showed incomplete exhalation.  BP stable.  A circuit disconnect was performed, TV changed to 520 (6cc/kg), rate reduced to 20, and continuous duoneb was started.  He is on systemic steroids still.  With these measures, the patients Peak improved to 38 and he remains hemodynamically stable.  Repeat ABG with acceptable values and CO2 in the mid-40s (likely close to baseline).  He has started spontaneously breathing this AM.  He will go for CT head tonight for re-evaluation of his suspected anoxic brain injury.      Corneal  reflexes still absent and patient not responsive to noxious stimuli.  Breathing over vent.  Otherwise unchanged.      Review of Systems   Unable to perform ROS: Intubated     Objective:     Vital Signs (Most Recent):  Temp: 97.7 °F (36.5 °C) (03/31/19 0945)  Pulse: (!) 111 (03/31/19 0945)  Resp: (!) 27 (03/31/19 0945)  BP: (!) 162/101 (03/31/19 0945)  SpO2: 98 % (03/31/19 0945) Vital Signs (24h Range):  Temp:  [97 °F (36.1 °C)-97.7 °F (36.5 °C)] 97.7 °F (36.5 °C)  Pulse:  [] 111  Resp:  [20-30] 27  SpO2:  [94 %-99 %] 98 %  BP: ()/() 162/101     Weight: 99.2 kg (218 lb 11.1 oz)  Body mass index is 26.62 kg/m².      Intake/Output Summary (Last 24 hours) at 3/31/2019 1029  Last data filed at 3/31/2019 0900  Gross per 24 hour   Intake 631.78 ml   Output 1137 ml   Net -505.22 ml       Physical Exam   Constitutional: He appears well-developed and well-nourished.   Appears younger than age.  Sedated, repetitive movements of face, mainly lips and jaw.   HENT:   Head: Normocephalic and atraumatic.   Mouth/Throat: No oropharyngeal exudate.   Eyes: No scleral icterus.   Pin point pupils.   Neck: Normal range of motion. Neck supple. No JVD present.   Cardiovascular: Normal rate, regular rhythm, normal heart sounds and intact distal pulses. Exam reveals no gallop and no friction rub.   No murmur heard.  Pulmonary/Chest: No respiratory distress. He has wheezes. He has no rales.   Mechanical ventilation   Abdominal: Soft. Bowel sounds are normal. He exhibits no distension.   Musculoskeletal: He exhibits edema.   Lymphadenopathy:     He has no cervical adenopathy.   Neurological:   Sedated, no response to noxious stimuli     Skin: Skin is warm and dry. Capillary refill takes less than 2 seconds.       Vents:  Vent Mode: A/C (03/31/19 0908)  Ventilator Initiated: Yes (03/28/19 2001)  Set Rate: 20 bmp (03/31/19 0908)  Vt Set: 520 mL (03/31/19 0908)  PEEP/CPAP: 5 cmH20 (03/31/19 0908)  Oxygen Concentration (%): 30  (03/31/19 0908)  Peak Airway Pressure: 41 cmH2O (03/31/19 0908)  Total Ve: 13.5 mL (03/31/19 0908)  F/VT Ratio<105 (RSBI): (!) 32.21 (03/31/19 0908)    Lines/Drains/Airways     Central Venous Catheter Line                 Percutaneous Central Line Insertion/Assessment - triple lumen  03/28/19 1900 left femoral vein 2 days          Drain                 NG/OG Tube 03/29/19 0346 orogastric 18 Fr. Right mouth 2 days         Urethral Catheter 03/28/19 1844 Temperature probe 16 Fr. 2 days          Airway                 Airway - Non-Surgical 03/28/19 1835 Endotracheal Tube 2 days          Peripheral Intravenous Line                 Peripheral IV - Single Lumen 03/28/19 1839 Left Antecubital 2 days         Peripheral IV - Single Lumen 03/28/19 1839 Left Hand 2 days                Significant Labs:    CBC/Anemia Profile:  Recent Labs   Lab 03/30/19  0526 03/30/19  1005 03/31/19  0426   WBC 17.63*  --  17.19*   HGB 12.3* 12.4* 12.1*   HCT 38.4*  --  37.2*     --  224   MCV 87  --  86   RDW 14.8*  --  15.4*        Chemistries:  Recent Labs   Lab 03/30/19  0526  03/31/19  0059 03/31/19  0426 03/31/19  0856      < > 142 143 144   K 3.7   < > 4.5 4.6 4.7   *   < > 113* 112* 113*   CO2 20*   < > 22* 22* 26   BUN 40*   < > 49* 51* 52*   CREATININE 1.5*   < > 1.9* 1.9* 1.8*   CALCIUM 8.2*   < > 8.4* 8.6* 8.6*   ALBUMIN 3.0*  --   --  3.0*  --    PROT 5.8*  --   --  6.1  --    BILITOT 0.2  --   --  0.2  --    ALKPHOS 54*  --   --  57  --    *  --   --  96*  --    AST 77*  --   --  92*  --    MG 3.6*   < > 4.0* 4.0* 4.2*   PHOS 2.9   < > 3.8 3.9 4.2    < > = values in this interval not displayed.       All pertinent labs within the past 24 hours have been reviewed.    Significant Imaging:  I have reviewed all pertinent imaging results/findings within the past 24 hours.    Assessment/Plan:     Active Diagnoses:    Diagnosis Date Noted POA    PRINCIPAL PROBLEM:  Cardiac arrest [I46.9] 03/28/2019 Yes     Upper GI bleeding [K92.2] 03/30/2019 Yes    Abnormal liver enzymes [R74.8] 03/29/2019 Yes    Leukocytosis [D72.829] 03/29/2019 Yes    Acute tubular necrosis [N17.0] 03/28/2019 Yes    Ischemic hepatitis [K75.9] 03/28/2019 Yes    Lactic acidosis [E87.2] 03/28/2019 Yes    Acute respiratory failure with hypoxia and hypercarbia [J96.01, J96.02] 03/28/2019 Yes    Anoxic brain injury [G93.1] 03/28/2019 Yes    Non-traumatic rhabdomyolysis [M62.82] 03/28/2019 Yes    Myoclonic jerking [G25.3] 03/28/2019 Yes    Essential hypertension [I10] 12/13/2015 Yes     Chronic    Chronic obstructive pulmonary disease with acute exacerbation [J44.1] 12/13/2015 Yes    Chronic obstructive pulmonary disease [J44.9] 12/02/2014 Yes     Chronic    Cigarette nicotine dependence without complication [F17.210] 12/02/2014 Yes     Chronic      Problems Resolved During this Admission:    Diagnosis Date Noted Date Resolved POA    Hyperkalemia, diminished renal excretion [E87.5] 03/28/2019 03/30/2019 Yes       1) Acute hypoxemic/hypercarbic respiratory failure and PEA Arrest  - rewarming complete, no significant neurologic improvement.  - needs CT head repeat at 72 hours (tonight at 8pm)  - continue mechanical ventilation with rate of 20 at .  Repeat ABG shows appropriate hypercarbia in the patient with known lung disease.  He now has a respiratory drive that is appropriate  - mental status will preclude extubation at this point.  - needs daily ABGs.    2) HFpEF  - off pressors  - needs good HR regimen if improvement occurs.  - patient is +3L since admission, would benefit from repeat chest x-ray in AM.      3) COPD  - continue duonebs q4hr for now, wheezing noted still on exam.  - if peak pressures uptrend again, would repeat hour long 10mg albuterol therapy as this had good result.  - continue budesonide neb for now  - continue solumedrol for now 80mg Q8hr    4) concern for anoxic brain injury   - repeat CT heat at 72 hours.  Tonight 8pm    5) Upper GI bleed  - agree with PPI BID  - if HGB changes or drops, may benefit from GI consultation.       Darius Huerta MD  Critical Care - Medicine  [facility]  [phone]

## 2019-03-31 NOTE — NURSING
Propofol placed on hold   8:51- pt unresponsive to painful stimuli, breathing 22 times/min, vent rate set at 20

## 2019-03-31 NOTE — SUBJECTIVE & OBJECTIVE
Interval History: Propofol off at 0800 this AM and no response or twitching noted. NGT output appears more bile like than bloody today.     Review of Systems   Unable to perform ROS: Intubated     Objective:     Vital Signs (Most Recent):  Temp: 97.7 °F (36.5 °C) (03/31/19 1045)  Pulse: (!) 113 (03/31/19 1045)  Resp: (!) 30 (03/31/19 1045)  BP: (!) 167/97 (03/31/19 1045)  SpO2: 98 % (03/31/19 1045) Vital Signs (24h Range):  Temp:  [97 °F (36.1 °C)-97.7 °F (36.5 °C)] 97.7 °F (36.5 °C)  Pulse:  [] 113  Resp:  [20-30] 30  SpO2:  [94 %-99 %] 98 %  BP: ()/() 167/97     Weight: 99.2 kg (218 lb 11.1 oz)  Body mass index is 26.62 kg/m².    Intake/Output Summary (Last 24 hours) at 3/31/2019 1115  Last data filed at 3/31/2019 0900  Gross per 24 hour   Intake 631.78 ml   Output 1087 ml   Net -455.22 ml      Physical Exam   Constitutional: He appears well-developed and well-nourished.   HENT:   Head: Normocephalic and atraumatic.   OGT/ETT in place   Eyes: Pupils are equal, round, and reactive to light.   Corneal reflexes present bilaterally   Cardiovascular: Normal rate and regular rhythm.   No murmur heard.  Pulmonary/Chest: Effort normal and breath sounds normal. No respiratory distress.   Abdominal: Soft. Bowel sounds are normal. He exhibits no distension. There is no tenderness.   Musculoskeletal: He exhibits no edema or tenderness.   Neurological: He is unresponsive. GCS eye subscore is 1. GCS verbal subscore is 1. GCS motor subscore is 1.   No gag or cough noted. Breathing over the ventilator currently.    Skin: Skin is warm and dry.   Nursing note and vitals reviewed.      Significant Labs:   ABGs:   Recent Labs   Lab 03/31/19  0928   PH 7.329*   PCO2 45.4*   HCO3 23.9*   POCSATURATED 96   BE -2     CBC:   Recent Labs   Lab 03/30/19  0526 03/30/19  1005 03/31/19  0426   WBC 17.63*  --  17.19*   HGB 12.3* 12.4* 12.1*   HCT 38.4*  --  37.2*     --  224     CMP:   Recent Labs   Lab 03/30/19  0526   03/31/19  0059 03/31/19  0426 03/31/19  0856      < > 142 143 144   K 3.7   < > 4.5 4.6 4.7   *   < > 113* 112* 113*   CO2 20*   < > 22* 22* 26   *  160*   < > 152*  152* 149*  149* 142*  142*   BUN 40*   < > 49* 51* 52*   CREATININE 1.5*   < > 1.9* 1.9* 1.8*   CALCIUM 8.2*   < > 8.4* 8.6* 8.6*   PROT 5.8*  --   --  6.1  --    ALBUMIN 3.0*  --   --  3.0*  --    BILITOT 0.2  --   --  0.2  --    ALKPHOS 54*  --   --  57  --    AST 77*  --   --  92*  --    *  --   --  96*  --    ANIONGAP 11   < > 7* 9 5*   EGFRNONAA 49*   < > 37* 37* 39*    < > = values in this interval not displayed.     Lactic Acid:   Recent Labs   Lab 03/29/19  1614 03/30/19  0526   LACTATE 7.2* 3.6*     Magnesium:   Recent Labs   Lab 03/31/19  0059 03/31/19  0426 03/31/19  0856   MG 4.0* 4.0* 4.2*       Significant Imaging: CXR: I have reviewed all pertinent results/findings within the past 24 hours and my personal findings are:  No focal infiltrates or consolidation. ETT/NGT in place.

## 2019-03-31 NOTE — PROGRESS NOTES
[facility] Hospital Medicine  Progress Note    Patient Name: Abhijit Marie  MRN: [id number]  Patient Class: IP- Inpatient   Admission Date: 3/28/2019  Length of Stay: 3 days  Attending Physician: Rogelio Grant MD  Primary Care Provider: Primary Doctor No        Subjective:     Principal Problem:Cardiac arrest    HPI:  Abhijit Marie is a 63 y.o. black man with hypertension, history of cigarette smoking (quit 9/28/18), and chronic obstructive pulmonary disease with history of ICU admission for exacerbation. He lives in [place], [place]. He has a fiancee with whom he has a 15 month old daughter named Shruthi. He works for [employer].   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 His oxygen saturation was 90% with tachypnea and blood pressure of 224/128. He was given albuterol, ipratropium, methylprednisolone, and hydralazine and discharged home after feeling somewhat better. He still had shortness of breath. When he went to work, he told his fiancee that he was having hot sweats. When he returned home, he felt weak. His fiancee went out to get him something to eat and drink around 16:30. When his fiancee returned home, she found him on the floor unresponsive. She could barely feel his pulse. EMS was called and found him to have pulseless electrical activity, and CPR was initiated at 17:56 pm. Pulse was regained at 18:11 pm. He had 4 doses of epinephrine and 1 dose of sodium bicarbonate given via interosseous line, and 100 cc of saliva/vomitues was suctioned from his airway. He was intubated. In the emergency department, he was found to have lactic acidosis, hypercapnia, acute kidney injury, acute hepatic injury, rhabdomyolysis. He had no neurologic responses. He was admitted to [facility]. He displayed myoclonic activity.     [facility]  "Course:  Started on TTM in the ED and reached goal temperature of < 33 C around 0400 on 3/29. He was maintained at that temperature for 24 hours prior to starting the rewarming process. EEG performed and showed "a burst suppressed background with generalized background suppressions lasting up to 5 min.  This is consistent with a severe encephalopathy.  This finding is nonspecific with regards to etiology but can be seen in the setting of toxic/metabolic derangements, infection, as a medication effect, and in anoxia.  There are no epileptiform discharges and no electrographic seizures."     Interval History: Propofol off at 0800 this AM and no response or twitching noted. NGT output appears more bile like than bloody today.     Review of Systems   Unable to perform ROS: Intubated     Objective:     Vital Signs (Most Recent):  Temp: 97.7 °F (36.5 °C) (03/31/19 1045)  Pulse: (!) 113 (03/31/19 1045)  Resp: (!) 30 (03/31/19 1045)  BP: (!) 167/97 (03/31/19 1045)  SpO2: 98 % (03/31/19 1045) Vital Signs (24h Range):  Temp:  [97 °F (36.1 °C)-97.7 °F (36.5 °C)] 97.7 °F (36.5 °C)  Pulse:  [] 113  Resp:  [20-30] 30  SpO2:  [94 %-99 %] 98 %  BP: ()/() 167/97     Weight: 99.2 kg (218 lb 11.1 oz)  Body mass index is 26.62 kg/m².    Intake/Output Summary (Last 24 hours) at 3/31/2019 1115  Last data filed at 3/31/2019 0900  Gross per 24 hour   Intake 631.78 ml   Output 1087 ml   Net -455.22 ml      Physical Exam   Constitutional: He appears well-developed and well-nourished.   HENT:   Head: Normocephalic and atraumatic.   OGT/ETT in place   Eyes: Pupils are equal, round, and reactive to light.   Corneal reflexes present bilaterally   Cardiovascular: Normal rate and regular rhythm.   No murmur heard.  Pulmonary/Chest: Effort normal and breath sounds normal. No respiratory distress.   Abdominal: Soft. Bowel sounds are normal. He exhibits no distension. There is no tenderness.   Musculoskeletal: He exhibits no edema " or tenderness.   Neurological: He is unresponsive. GCS eye subscore is 1. GCS verbal subscore is 1. GCS motor subscore is 1.   No gag or cough noted. Breathing over the ventilator currently.    Skin: Skin is warm and dry.   Nursing note and vitals reviewed.      Significant Labs:   ABGs:   Recent Labs   Lab 03/31/19  0928   PH 7.329*   PCO2 45.4*   HCO3 23.9*   POCSATURATED 96   BE -2     CBC:   Recent Labs   Lab 03/30/19  0526 03/30/19  1005 03/31/19 0426   WBC 17.63*  --  17.19*   HGB 12.3* 12.4* 12.1*   HCT 38.4*  --  37.2*     --  224     CMP:   Recent Labs   Lab 03/30/19  0526 03/31/19  0059 03/31/19 0426 03/31/19  0856      < > 142 143 144   K 3.7   < > 4.5 4.6 4.7   *   < > 113* 112* 113*   CO2 20*   < > 22* 22* 26   *  160*   < > 152*  152* 149*  149* 142*  142*   BUN 40*   < > 49* 51* 52*   CREATININE 1.5*   < > 1.9* 1.9* 1.8*   CALCIUM 8.2*   < > 8.4* 8.6* 8.6*   PROT 5.8*  --   --  6.1  --    ALBUMIN 3.0*  --   --  3.0*  --    BILITOT 0.2  --   --  0.2  --    ALKPHOS 54*  --   --  57  --    AST 77*  --   --  92*  --    *  --   --  96*  --    ANIONGAP 11   < > 7* 9 5*   EGFRNONAA 49*   < > 37* 37* 39*    < > = values in this interval not displayed.     Lactic Acid:   Recent Labs   Lab 03/29/19  1614 03/30/19 0526   LACTATE 7.2* 3.6*     Magnesium:   Recent Labs   Lab 03/31/19  0059 03/31/19  0426 03/31/19  0856   MG 4.0* 4.0* 4.2*       Significant Imaging: CXR: I have reviewed all pertinent results/findings within the past 24 hours and my personal findings are:  No focal infiltrates or consolidation. ETT/NGT in place.     Assessment/Plan:      * Cardiac arrest  Lactic acidosis  Acute tubular necrosis  Hyperkalemia, diminished renal excretion - Resolved  Ischemic hepatitis  Abnormal liver enzymes  Anoxic brain injury  Myoclonic jerking  Non-traumatic rhabdomyolysis  Leukocytosis  Monitor labs. Hypothermia protocol and reached target temperature of 33 C at 0355 on  3/29 and maintained for 24 hours. Rewarmed 3/30 and now maintaining normothermia. Currently with corneal reflexes and is breathing over the vent, but no purposeful movement off the propofol. Continue to hold the propofol for now unless starts having myoclonic activity again. Appreciate Pulmonology assistance. Wean FiO2 to prevent oxygen toxicity. Levetiracetam 1000 mg BID, lorazepam prn for myoclonic jerking. Electroencephalogram done 3/29/19 and negative for epileptic activity. Echocardiogram read pending to evaluate cardiac function. Hepatitis panel pending. AST/ALT improving. Repeat CT head overnight. Repeat EEG tomorrow to ensure no NCSE.     Chronic obstructive pulmonary disease  Chronic obstructive pulmonary disease with acute exacerbation  Acute respiratory failure with hypoxia and hypercarbia  Uses albuterol nebulizer at home. Continue duonebs every 4 hours. Started on budesonide neb.  Give methylprednisolone 80 mg IV every 8 hours. Ventilator management. Appreciate Pulmonary assistance.     Upper GI bleeding  Potential trauma related to OG tube. Continue PPI IV BID. Hgb remains stable around 12. Continue to monitor. Hold lovenox.     Essential hypertension  Takes hydrochlorothiazide, amlodipine at home. Hold for now. SBP ranged 91 to 124. Continue to monitor.Hydalazine prn.     Cigarette nicotine dependence without complication  Quit last year.      VTE Risk Mitigation (From admission, onward)        Ordered     IP VTE HIGH RISK PATIENT  Once      03/28/19 2229     Place sequential compression device  Until discontinued      03/28/19 1943          Critical care time spent on the evaluation and treatment of severe organ dysfunction, review of pertinent labs and imaging studies, discussions with consulting providers and discussions with patient/family: 40 minutes.    Rogelio Grant MD  Department of Hospital Medicine   [facility]   never used

## 2019-04-01 PROBLEM — Z71.89 GOALS OF CARE, COUNSELING/DISCUSSION: Status: ACTIVE | Noted: 2019-01-01

## 2019-04-01 PROBLEM — G93.6 CEREBRAL EDEMA DUE TO ANOXIA: Status: ACTIVE | Noted: 2019-01-01

## 2019-04-01 PROBLEM — R09.02 CEREBRAL EDEMA DUE TO ANOXIA: Status: ACTIVE | Noted: 2019-01-01

## 2019-04-01 PROBLEM — E87.20 LACTIC ACIDOSIS: Status: RESOLVED | Noted: 2019-01-01 | Resolved: 2019-01-01

## 2019-04-01 PROBLEM — E87.0 HYPERNATREMIA: Status: ACTIVE | Noted: 2019-01-01

## 2019-04-01 NOTE — ASSESSMENT & PLAN NOTE
Takes hydrochlorothiazide, amlodipine at home. Hold for now. SBP ranged 115 to 207. Continue to monitor.Hydalazine prn. Will start on amlodipine 10 mg daily.

## 2019-04-01 NOTE — ASSESSMENT & PLAN NOTE
Chronic obstructive pulmonary disease with acute exacerbation  Acute respiratory failure with hypoxia and hypercarbia  Uses albuterol nebulizer at home. Continue duonebs every 4 hours. Started on budesonide neb.  Continue methylprednisolone 40 mg IV daily. Ventilator management. Appreciate Pulmonary assistance.

## 2019-04-01 NOTE — SUBJECTIVE & OBJECTIVE
Interval History: Propofol restarted last night because because was asynchronous with the ventilator. Propofol off again this AM and no purposeful movements. CTH and EEG today with results noted above.     Review of Systems   Unable to perform ROS: Intubated     Objective:     Vital Signs (Most Recent):  Temp: 97.5 °F (36.4 °C) (04/01/19 1328)  Pulse: 90 (04/01/19 1527)  Resp: (!) 25 (04/01/19 1527)  BP: (!) 157/99 (04/01/19 1511)  SpO2: (!) 94 % (04/01/19 1527) Vital Signs (24h Range):  Temp:  [96.3 °F (35.7 °C)-98.6 °F (37 °C)] 97.5 °F (36.4 °C)  Pulse:  [] 90  Resp:  [25-39] 25  SpO2:  [94 %-100 %] 94 %  BP: (124-207)/() 157/99     Weight: 98.9 kg (218 lb 0.6 oz)  Body mass index is 26.54 kg/m².    Intake/Output Summary (Last 24 hours) at 4/1/2019 1547  Last data filed at 4/1/2019 0707  Gross per 24 hour   Intake 447.58 ml   Output 1320 ml   Net -872.42 ml      Physical Exam   Constitutional: He appears well-developed and well-nourished.   HENT:   Head: Normocephalic and atraumatic.   OGT/ETT in place   Eyes: Pupils are equal, round, and reactive to light.   Corneal reflexes present bilaterally   Cardiovascular: Normal rate and regular rhythm.   No murmur heard.  Pulmonary/Chest: Effort normal and breath sounds normal. No respiratory distress.   Abdominal: Soft. Bowel sounds are normal. He exhibits no distension. There is no tenderness.   Musculoskeletal: He exhibits no edema or tenderness.   Neurological: He is unresponsive. GCS eye subscore is 1. GCS verbal subscore is 1. GCS motor subscore is 1.   No gag or cough noted. Breathing over the ventilator currently.    Skin: Skin is warm and dry.   Nursing note and vitals reviewed.      Significant Labs:   ABGs:   Recent Labs   Lab 03/31/19  0928   PH 7.329*   PCO2 45.4*   HCO3 23.9*   POCSATURATED 96   BE -2     CBC:   Recent Labs   Lab 03/31/19  0426 04/01/19  0417   WBC 17.19* 18.36*   HGB 12.1* 12.3*   HCT 37.2* 38.6*    232     CMP:   Recent  Labs   Lab 03/31/19 0426 03/31/19 0856 04/01/19 0417    144 148*   K 4.6 4.7 4.9   * 113* 115*   CO2 22* 26 26   *  149* 142*  142* 139*   BUN 51* 52* 42*   CREATININE 1.9* 1.8* 1.3   CALCIUM 8.6* 8.6* 9.2   PROT 6.1  --  6.6   ALBUMIN 3.0*  --  3.0*   BILITOT 0.2  --  0.4   ALKPHOS 57  --  62   AST 92*  --  98*   ALT 96*  --  73*   ANIONGAP 9 5* 7*   EGFRNONAA 37* 39* 58*     Coagulation:   Recent Labs   Lab 04/01/19 0417   INR 1.0   APTT 25.1     Magnesium:   Recent Labs   Lab 03/31/19 0426 03/31/19 0856 04/01/19 0417   MG 4.0* 4.2* 4.0*       Significant Imaging: I have reviewed all pertinent imaging results/findings within the past 24 hours.

## 2019-04-01 NOTE — PLAN OF CARE
Propofol off since 0800 this morning, pt did fine but around 1930, started becoming agitated. HR started increasing to 120's, BP up in 200's systolic even after PRN hydralazine, pt asynchronous with vent with stacked breathing. Propofol restarted.

## 2019-04-01 NOTE — PROCEDURES
DATE OF PROCEDURE:  04/01/2019.    EEG#:  OK-.    REFERRING PHYSICIAN:  Dr. Grant    This EEG was performed to assess for subclinical seizures.    ELECTROENCEPHALOGRAM REPORT    METHODOLOGY:  Electroencephalographic (EEG) recording is recorded with   electrodes placed according to the International 10-20 placement system.  Thirty   two (32) channels of digital signal (sampling rate of 512/sec), including T1   and T2, were simultaneously recorded from the scalp and may include EKG, EMG,   and/or eye monitors.  Recording band pass was 0.1 to 512 Hz.  Digital video   recording of the patient is simultaneously recorded with the EEG.  The patient   is instructed to report clinical symptoms which may occur during the recording   session.  EEG and video recording are stored and archived in digital format.    Activation procedures, which include photic stimulation, hyperventilation and   instructing patients to perform simple tasks, are done in selected patients.    The EEG is displayed on a monitor screen and can be reviewed using different   montages.  Computer assisted-analysis is employed to detect spike and   electrographic seizure activity.   The entire record is submitted for computer   analysis.  The entire recording is visually reviewed, and the times identified   by computer analysis as being spikes or seizures are reviewed again.    Compressed spectral analysis (CSA) is also performed on the activity recorded   from each individual channel.  This is displayed as a power display of   frequencies from 0 to 30 Hz over time.   The CSA is reviewed looking for   asymmetries in power between homologous areas of the scalp, then compared with   the original EEG recording.    [employer] software was also utilized in the review of this study.  This software   suite analyzes the EEG recording in multiple domains.  Coherence and rhythmicity   are computed to identify EEG sections which may contain organized seizures.     Each channel undergoes analysis to detect the presence of spike and sharp waves   which have special and morphological characteristics of epileptic activity.  The   routine EEG recording is converted from special into frequency domain.  This is   then displayed comparing homologous areas to identify areas of significant   asymmetry.  Algorithm to identify non-cortically generated artifact is used to   separate artifact from the EEG.    EEG FINDINGS:  The recording was obtained with a number of standard bipolar and   referential montages during comatose state.  In this state, the background was   diffusely suppressed and comprised of minimal cortical activity.  Spontaneous   variability was noted.  Reactivity was absent.  No clear state changes were   appreciated.  There were no interictal epileptiform abnormalities and no   clinical or electrographic seizure recorded.    The EKG channel revealed sinus rhythm.    IMPRESSION:  This is an abnormal EEG during comatose state.  Diffuse suppression   of the background with minimal cortical activity was noted.    CLINICAL CORRELATION:  The patient is a 63-year-old male who is currently   maintained on levetiracetam.  This is an abnormal EEG during comatose state.    The overall degree of suppression with minimal variability and lack of   reactivity is suggestive of a severe encephalopathy with guarded prognosis.    There is no evidence of an epileptic process on this recording.  Episodes of   head jerking were not associated with any abnormality on EEG.      FAK/ELY  dd: 04/01/2019 14:14:04 (CDT)  td: 04/01/2019 14:29:03 (CDT)  Doc ID   #[id number]  Job ID #[id number]    CC:

## 2019-04-01 NOTE — ASSESSMENT & PLAN NOTE
Spoke with daughter, Nilay, this afternoon about current status. He still has corneal reflex and is breathing over the ventilator, so does not meet brain death at this time. Told her that his current state is likely to be the best he will be going forward, and that he would require 24 hour care with trach and PEG in a facility. She does not think that he would have wanted that. She is going to talk with her sister and brother and her dad's siblings as well about this. Will consult Palliative Care for continued assistance with goals of care discussions and likely transition to hospice for terminal extubation.

## 2019-04-01 NOTE — PROGRESS NOTES
[facility]  Progress Note    Patient Name: Abhijit Marie  MRN: [id number]  Patient Class: IP- Inpatient   Admission Date: 3/28/2019  Length of Stay: 4 days  Attending Physician: Rogelio Grant MD  Primary Care Provider: Primary Doctor No        Subjective:     Principal Problem:Cardiac arrest    HPI:  Abhijit Marie is a 63 y.o. black man with hypertension, history of cigarette smoking (quit 9/28/18), and chronic obstructive pulmonary disease with history of ICU admission for exacerbation. He lives in [place], [place]. He has a fiancee with whom he has a 15 month old daughter named Shruthi. He works for [employer].   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 His oxygen saturation was 90% with tachypnea and blood pressure of 224/128. He was given albuterol, ipratropium, methylprednisolone, and hydralazine and discharged home after feeling somewhat better. He still had shortness of breath. When he went to work, he told his fiancee that he was having hot sweats. When he returned home, he felt weak. His fiancee went out to get him something to eat and drink around 16:30. When his fiancee returned home, she found him on the floor unresponsive. She could barely feel his pulse. EMS was called and found him to have pulseless electrical activity, and CPR was initiated at 17:56 pm. Pulse was regained at 18:11 pm. He had 4 doses of epinephrine and 1 dose of sodium bicarbonate given via interosseous line, and 100 cc of saliva/vomitues was suctioned from his airway. He was intubated. In the emergency department, he was found to have lactic acidosis, hypercapnia, acute kidney injury, acute hepatic injury, rhabdomyolysis. He had no neurologic responses. He was admitted to [facility]. He displayed myoclonic activity.     [facility]  "Course:  Started on TTM in the ED and reached goal temperature of < 33 C around 0400 on 3/29. He was maintained at that temperature for 24 hours prior to starting the rewarming process. EEG performed and showed "a burst suppressed background with generalized background suppressions lasting up to 5 min.  This is consistent with a severe encephalopathy.  This finding is nonspecific with regards to etiology but can be seen in the setting of toxic/metabolic derangements, infection, as a medication effect, and in anoxia.  There are no epileptiform discharges and no electrographic seizures." Repeat CT head showed "diffuse cerebral edema noting loss of gray-white matter differentiation and progressive loss of sulcation." EEG on 4/1 showed "overall degree of suppression with minimal variability and lack of reactivity is suggestive of a severe encephalopathy with guarded prognosis. There is no evidence of an epileptic process on this recording. Episodes of head jerking were not associated with any abnormality on EEG." Propofol was discontinued and he was started on precedex for agitation to avoid the respiratory and neurologic suppression.         Interval History: Propofol restarted last night because because was asynchronous with the ventilator. Propofol off again this AM and no purposeful movements. CTH and EEG today with results noted above.     Review of Systems   Unable to perform ROS: Intubated     Objective:     Vital Signs (Most Recent):  Temp: 97.5 °F (36.4 °C) (04/01/19 1328)  Pulse: 90 (04/01/19 1527)  Resp: (!) 25 (04/01/19 1527)  BP: (!) 157/99 (04/01/19 1511)  SpO2: (!) 94 % (04/01/19 1527) Vital Signs (24h Range):  Temp:  [96.3 °F (35.7 °C)-98.6 °F (37 °C)] 97.5 °F (36.4 °C)  Pulse:  [] 90  Resp:  [25-39] 25  SpO2:  [94 %-100 %] 94 %  BP: (124-207)/() 157/99     Weight: 98.9 kg (218 lb 0.6 oz)  Body mass index is 26.54 kg/m².    Intake/Output Summary (Last 24 hours) at 4/1/2019 1547  Last data " filed at 4/1/2019 0707  Gross per 24 hour   Intake 447.58 ml   Output 1320 ml   Net -872.42 ml      Physical Exam   Constitutional: He appears well-developed and well-nourished.   HENT:   Head: Normocephalic and atraumatic.   OGT/ETT in place   Eyes: Pupils are equal, round, and reactive to light.   Corneal reflexes present bilaterally   Cardiovascular: Normal rate and regular rhythm.   No murmur heard.  Pulmonary/Chest: Effort normal and breath sounds normal. No respiratory distress.   Abdominal: Soft. Bowel sounds are normal. He exhibits no distension. There is no tenderness.   Musculoskeletal: He exhibits no edema or tenderness.   Neurological: He is unresponsive. GCS eye subscore is 1. GCS verbal subscore is 1. GCS motor subscore is 1.   No gag or cough noted. Breathing over the ventilator currently.    Skin: Skin is warm and dry.   Nursing note and vitals reviewed.      Significant Labs:   ABGs:   Recent Labs   Lab 03/31/19 0928   PH 7.329*   PCO2 45.4*   HCO3 23.9*   POCSATURATED 96   BE -2     CBC:   Recent Labs   Lab 03/31/19 0426 04/01/19 0417   WBC 17.19* 18.36*   HGB 12.1* 12.3*   HCT 37.2* 38.6*    232     CMP:   Recent Labs   Lab 03/31/19 0426 03/31/19  0856 04/01/19 0417    144 148*   K 4.6 4.7 4.9   * 113* 115*   CO2 22* 26 26   *  149* 142*  142* 139*   BUN 51* 52* 42*   CREATININE 1.9* 1.8* 1.3   CALCIUM 8.6* 8.6* 9.2   PROT 6.1  --  6.6   ALBUMIN 3.0*  --  3.0*   BILITOT 0.2  --  0.4   ALKPHOS 57  --  62   AST 92*  --  98*   ALT 96*  --  73*   ANIONGAP 9 5* 7*   EGFRNONAA 37* 39* 58*     Coagulation:   Recent Labs   Lab 04/01/19 0417   INR 1.0   APTT 25.1     Magnesium:   Recent Labs   Lab 03/31/19 0426 03/31/19 0856 04/01/19 0417   MG 4.0* 4.2* 4.0*       Significant Imaging: I have reviewed all pertinent imaging results/findings within the past 24 hours.    Assessment/Plan:      * Cardiac arrest  Lactic acidosis  Acute tubular necrosis  Hyperkalemia,  diminished renal excretion - Resolved  Ischemic hepatitis  Abnormal liver enzymes  Anoxic brain injury  Cerebral edema due to anoxia  Myoclonic jerking  Non-traumatic rhabdomyolysis  Leukocytosis  Monitor labs. Hypothermia protocol completed and reached target temperature of 33 C at 0355 on 3/29 and maintained for 24 hours. Rewarmed 3/30 and now maintaining normothermia. Currently with corneal reflexes and is breathing over the vent, but no purposeful movement off the propofol. Continue to hold the propofol for now unless starts having myoclonic activity again. Appreciate Pulmonology assistance. Wean FiO2 to prevent oxygen toxicity. Levetiracetam 1000 mg BID, lorazepam prn for myoclonic jerking. Electroencephalogram done 3/29/19 and negative for epileptic activity. Repeat EEG showed significant encephalopathy but no seizure activity. Echocardiogram read pending to evaluate cardiac function. Hepatitis panel pending. AST/ALT improving. Repeat CT head this AM with increased cerebral edema.     Chronic obstructive pulmonary disease  Chronic obstructive pulmonary disease with acute exacerbation  Acute respiratory failure with hypoxia and hypercarbia  Uses albuterol nebulizer at home. Continue duonebs every 4 hours. Started on budesonide neb.  Continue methylprednisolone 40 mg IV daily. Ventilator management. Appreciate Pulmonary assistance.     Goals of care, counseling/discussion  Spoke with daughter, Nilay, this afternoon about current status. He still has corneal reflex and is breathing over the ventilator, so does not meet brain death at this time. Told her that his current state is likely to be the best he will be going forward, and that he would require 24 hour care with trach and PEG in a facility. She does not think that he would have wanted that. She is going to talk with her sister and brother and her dad's siblings as well about this. Will consult Palliative Care for continued assistance with goals of care  discussions and likely transition to hospice for terminal extubation.     Hypernatremia  Give 1/2NS x 1 liter.     Upper GI bleeding  Potential trauma related to OG tube. Continue PPI IV BID. Hgb remains stable around 12. Continue to monitor. Hold lovenox.     Essential hypertension  Takes hydrochlorothiazide, amlodipine at home. Hold for now. SBP ranged 115 to 207. Continue to monitor.Hydalazine prn. Will start on amlodipine 10 mg daily.     Cigarette nicotine dependence without complication  Quit last year.      VTE Risk Mitigation (From admission, onward)        Ordered     IP VTE HIGH RISK PATIENT  Once      03/28/19 2229     Place sequential compression device  Until discontinued      03/28/19 1943          Critical care time spent on the evaluation and treatment of severe organ dysfunction, review of pertinent labs and imaging studies, discussions with consulting providers and discussions with patient/family: 45 minutes.    Rogelio Grant MD  Department of Hospital Medicine   [facility]

## 2019-04-01 NOTE — PROGRESS NOTES
[facility]  Critical Care - Medicine  Progress Note    Patient Name: Abhijit Marie  MRN: [id number]  Admission Date: 3/28/2019  Hospital Length of Stay: 4 days  Code Status: Full Code  Attending Provider: Rogelio Grant MD  Primary Care Provider: Primary Doctor No   Principal Problem: Cardiac arrest    Subjective:     Interval history:  63 male with HTN, HFpEF, COPD, who is currently admitted to the MICU following a hypoxemic/hypercarbic PEA arrest that was unwitnessed.  He was last normal 3/28 at 4:30pm and CPR was started at 5:56PM the same day.  Exact downtime is unknown.  He was admitted to the MICU and underwent cooling protocol, and is currently being rewarmed.  He is on keppra for anti-epileptic therapy (did have EEG which was negative for active seizures).  He is having evidence of probable myclonus activity manifested by facial twitching.  He is on propofol for sedation. He initially had a respiratory acidosis that has since corrected with mechanical ventilation.  He is not responding to noxious stimuli.  He completed rewarming the AM of 8/30. 72 hours post initial head CT will be 8/31 at 8pm.  Awaiting 72 hours for prognostication.  Initial CT head on 3/28 without acute abnormality.    Interval History:  This AM patient had increased Peak pressures of ~60 and marked wheezing.  Plateau pressures were 53, indicative of lower airway issues.  The ventilator also showed incomplete exhalation.  BP stable.  A circuit disconnect was performed, TV changed to 520 (6cc/kg), rate reduced to 20, and continuous duoneb was started.  He is on systemic steroids still.  With these measures, the patients Peak improved to 38 and he remains hemodynamically stable.  Repeat ABG with acceptable values and CO2 in the mid-40s (likely close to baseline).  He has started spontaneously breathing this AM.  He will go for CT head tonight for re-evaluation of his suspected anoxic brain injury.      Corneal  reflexes still absent and patient not responsive to noxious stimuli.  Breathing over vent.  Otherwise unchanged.      Review of Systems   Unable to perform ROS: Intubated   Constitutional: Negative for activity change, appetite change, chills, diaphoresis and fatigue.   HENT: Negative.    Eyes: Negative.    Respiratory: Negative.    Cardiovascular: Negative.    Gastrointestinal: Negative.    Genitourinary: Negative.      Objective:     Vital Signs (Most Recent):  Temp: 97.5 °F (36.4 °C) (04/01/19 1019)  Pulse: 105 (04/01/19 1019)  Resp: (!) 34 (04/01/19 1019)  BP: (!) 167/96 (04/01/19 1017)  SpO2: (!) 94 % (04/01/19 1019) Vital Signs (24h Range):  Temp:  [96.3 °F (35.7 °C)-97.9 °F (36.6 °C)] 97.5 °F (36.4 °C)  Pulse:  [] 105  Resp:  [25-39] 34  SpO2:  [94 %-100 %] 94 %  BP: (126-207)/() 167/96     Weight: 98.9 kg (218 lb 0.6 oz)  Body mass index is 26.54 kg/m².      Intake/Output Summary (Last 24 hours) at 4/1/2019 1049  Last data filed at 4/1/2019 0600  Gross per 24 hour   Intake 447.58 ml   Output 1685 ml   Net -1237.42 ml       Physical Exam   Constitutional: He appears well-developed and well-nourished.   Appears younger than age.  repetitive movements of face, mainly lips and jaw. Off sedation, appears agitated   HENT:   Head: Normocephalic and atraumatic.   Mouth/Throat: No oropharyngeal exudate.   Eyes: No scleral icterus.   Pupils 3 mm. But equal, round, reactive to light.   Neck: Normal range of motion. Neck supple. No JVD present.   Cardiovascular: Normal rate, regular rhythm, normal heart sounds and intact distal pulses. Exam reveals no gallop and no friction rub.   No murmur heard.  Pulmonary/Chest: No respiratory distress. He has wheezes. He has no rales.   Mechanical ventilation   Abdominal: Soft. Bowel sounds are normal. He exhibits no distension.   Musculoskeletal: He exhibits edema.   Lymphadenopathy:     He has no cervical adenopathy.   Neurological:   Off sedation. + pupil  constriction, + cough with gag. Doesn't withdraw to pain.     Skin: Skin is warm and dry. Capillary refill takes less than 2 seconds.       Vents:  Vent Mode: A/C (04/01/19 0912)  Ventilator Initiated: Yes (03/28/19 2001)  Set Rate: 20 bmp (04/01/19 0912)  Vt Set: 560 mL (04/01/19 0912)  PEEP/CPAP: 5 cmH20 (04/01/19 0912)  Oxygen Concentration (%): 30 (04/01/19 0912)  Peak Airway Pressure: 36 cmH2O (04/01/19 0912)  Total Ve: 21 mL (04/01/19 0912)  F/VT Ratio<105 (RSBI): (!) 69.03 (04/01/19 0912)    Lines/Drains/Airways     Central Venous Catheter Line                 Percutaneous Central Line Insertion/Assessment - triple lumen  03/28/19 1900 left femoral vein 3 days          Drain                 NG/OG Tube 03/29/19 0346 orogastric 18 Fr. Right mouth 3 days         Urethral Catheter 03/28/19 1844 Temperature probe 16 Fr. 3 days          Airway                 Airway - Non-Surgical 03/28/19 1835 Endotracheal Tube 3 days          Peripheral Intravenous Line                 Peripheral IV - Single Lumen 03/28/19 1839 Left Antecubital 3 days         Peripheral IV - Single Lumen 03/28/19 1839 Left Hand 3 days                Significant Labs:    CBC/Anemia Profile:  Recent Labs   Lab 03/31/19  0426 04/01/19  0417   WBC 17.19* 18.36*   HGB 12.1* 12.3*   HCT 37.2* 38.6*    232   MCV 86 87   RDW 15.4* 15.7*        Chemistries:  Recent Labs   Lab 03/31/19  0426 03/31/19  0856 04/01/19  0417    144 148*   K 4.6 4.7 4.9   * 113* 115*   CO2 22* 26 26   BUN 51* 52* 42*   CREATININE 1.9* 1.8* 1.3   CALCIUM 8.6* 8.6* 9.2   ALBUMIN 3.0*  --  3.0*   PROT 6.1  --  6.6   BILITOT 0.2  --  0.4   ALKPHOS 57  --  62   ALT 96*  --  73*   AST 92*  --  98*   MG 4.0* 4.2* 4.0*   PHOS 3.9 4.2 2.9       All pertinent labs within the past 24 hours have been reviewed.    Significant Imaging:  I have reviewed all pertinent imaging results/findings within the past 24 hours.    Assessment/Plan:     Active Diagnoses:    Diagnosis  Date Noted POA    PRINCIPAL PROBLEM:  Cardiac arrest [I46.9] 03/28/2019 Yes    Upper GI bleeding [K92.2] 03/30/2019 Yes    Abnormal liver enzymes [R74.8] 03/29/2019 Yes    Leukocytosis [D72.829] 03/29/2019 Yes    Acute tubular necrosis [N17.0] 03/28/2019 Yes    Ischemic hepatitis [K75.9] 03/28/2019 Yes    Acute respiratory failure with hypoxia and hypercarbia [J96.01, J96.02] 03/28/2019 Yes    Anoxic brain injury [G93.1] 03/28/2019 Yes    Non-traumatic rhabdomyolysis [M62.82] 03/28/2019 Yes    Myoclonic jerking [G25.3] 03/28/2019 Yes    Essential hypertension [I10] 12/13/2015 Yes     Chronic    Chronic obstructive pulmonary disease with acute exacerbation [J44.1] 12/13/2015 Yes    Chronic obstructive pulmonary disease [J44.9] 12/02/2014 Yes     Chronic    Cigarette nicotine dependence without complication [F17.210] 12/02/2014 Yes     Chronic      Problems Resolved During this Admission:    Diagnosis Date Noted Date Resolved POA    Lactic acidosis [E87.2] 03/28/2019 04/01/2019 Yes    Hyperkalemia, diminished renal excretion [E87.5] 03/28/2019 03/30/2019 Yes       1) Acute hypoxemic/hypercarbic respiratory failure and PEA Arrest: s/p rewarming. Currently day 4 s/p PEA arrest. Pt does have pupil restriction, + gag, is breathing over the vent. Although thought brain dead, CT head done 3/31 showed diffuse cerebral edema.   - EEG was done today to help with prognostic indication  - continue mechanical ventilation at current settings  - currently on precedex for agitation. Precedex doesn't lower respiratory drive, and thus better choice than propofol  - pt needs daily SAT and SBT    2) HFpEF  - off pressors  - strongly recommend primary team start pt on anti-hypertensive agents. Pt's SBP was 180 this morning before getting IV hydralazine  - patient is +2L since admission. May need diuresing.    3) COPD  - continue duonebs q4hr for now, wheezing noted on exam.  - continue budesonide neb for now  - will  decrease steroids to 40mg methylpred daily    4) concern for anoxic brain injury   - CT head on 3/31 showing diffuse cerebral edema  - EEG done today  - pt currently on keppra 1000mg bid. Unsure exactly why? Pt's EEG from 3/29 showed severe encephalopathy but no epileptiform discharges. Keppra can cloud neurologic picture as it's sedating. If EEG today is negative, would recommend stopping keppra. Primary team can run this by neurology if they need a second opinion  - we are happy to assist with primary team in goals of care discussion, with the family if asked.    5) Upper GI bleed  - continue PPI BID  - if HGB changes or drops, may benefit from GI consultation. Currently stable  - holding pharmalogical DVT prophylaxis. On SCDs       Martín Osorio MD  Critical Care - Medicine  [facility]  [phone]

## 2019-04-01 NOTE — ASSESSMENT & PLAN NOTE
Lactic acidosis  Acute tubular necrosis  Hyperkalemia, diminished renal excretion - Resolved  Ischemic hepatitis  Abnormal liver enzymes  Anoxic brain injury  Cerebral edema due to anoxia  Myoclonic jerking  Non-traumatic rhabdomyolysis  Leukocytosis  Monitor labs. Hypothermia protocol completed and reached target temperature of 33 C at 0355 on 3/29 and maintained for 24 hours. Rewarmed 3/30 and now maintaining normothermia. Currently with corneal reflexes and is breathing over the vent, but no purposeful movement off the propofol. Continue to hold the propofol for now unless starts having myoclonic activity again. Appreciate Pulmonology assistance. Wean FiO2 to prevent oxygen toxicity. Levetiracetam 1000 mg BID, lorazepam prn for myoclonic jerking. Electroencephalogram done 3/29/19 and negative for epileptic activity. Repeat EEG showed significant encephalopathy but no seizure activity. Echocardiogram read pending to evaluate cardiac function. Hepatitis panel pending. AST/ALT improving. Repeat CT head this AM with increased cerebral edema.

## 2019-04-02 PROBLEM — N17.0 ACUTE TUBULAR NECROSIS: Status: RESOLVED | Noted: 2019-01-01 | Resolved: 2019-01-01

## 2019-04-02 PROBLEM — M62.82 NON-TRAUMATIC RHABDOMYOLYSIS: Status: RESOLVED | Noted: 2019-01-01 | Resolved: 2019-01-01

## 2019-04-02 PROBLEM — Z71.89 COUNSELING REGARDING ADVANCED CARE PLANNING AND GOALS OF CARE: Status: ACTIVE | Noted: 2019-01-01

## 2019-04-02 PROBLEM — Z51.5 PALLIATIVE CARE ENCOUNTER: Status: ACTIVE | Noted: 2019-01-01

## 2019-04-02 NOTE — PLAN OF CARE
Palliative Care met with family today and family wish for pt to remain full code and to continue treatment.    Discharge date/disposition TBD.  Tn to continue to follow     04/02/19 8726   Discharge Reassessment   Assessment Type Discharge Planning Reassessment

## 2019-04-02 NOTE — SUBJECTIVE & OBJECTIVE
Interval History: Palliative Care had family meeting.    Review of Systems   Unable to perform ROS: Patient unresponsive     Objective:     Vital Signs (Most Recent):  Temp: 100 °F (37.8 °C) (04/02/19 1315)  Pulse: 100 (04/02/19 1315)  Resp: (!) 29 (04/02/19 1315)  BP: 134/75 (04/02/19 1315)  SpO2: (!) 92 % (04/02/19 1327) Vital Signs (24h Range):  Temp:  [97 °F (36.1 °C)-100 °F (37.8 °C)] 100 °F (37.8 °C)  Pulse:  [] 100  Resp:  [20-42] 29  SpO2:  [91 %-97 %] 92 %  BP: (128-198)/() 134/75     Weight: 98.9 kg (218 lb 0.6 oz)  Body mass index is 26.54 kg/m².    Intake/Output Summary (Last 24 hours) at 4/2/2019 1346  Last data filed at 4/2/2019 1305  Gross per 24 hour   Intake 1621.84 ml   Output 2767 ml   Net -1145.16 ml      Physical Exam   Constitutional: He appears well-developed. No distress. He is intubated.   Eyes:   Eyes moving side to side   Pulmonary/Chest: He is intubated. He has rhonchi.   Abdominal: Soft. There is no tenderness.   Neurological: He is unresponsive. He displays no tremor.   Nursing note and vitals reviewed.      Significant Labs: All pertinent labs within the past 24 hours have been reviewed.    Significant Imaging: I have reviewed all pertinent imaging results/findings within the past 24 hours.   TRANSTHORACIC ECHO (TTE) COMPLETE 3/29/19:  · Mild concentric left ventricular hypertrophy.  · Small left ventricular cavity size.  · Normal left ventricular systolic function. The estimated ejection fraction is 55%  · Grade I (mild) left ventricular diastolic dysfunction consistent with impaired relaxation.  · Elevated left atrial pressure.  · Normal right ventricular systolic function.  · Intermediate central venous pressure (8 mm Hg).  · The estimated PA systolic pressure is 28 mm Hg  Electroencephalogram 3/29/19:   Impression: This is an abnormal routine EEG because of a burst suppressed background with generalized background suppressions lasting up to 5 min.  This is consistent  with a severe encephalopathy.  This finding is nonspecific with regards to etiology but can be seen in the setting of toxic/metabolic derangements, infection, as a medication effect, and in anoxia.  There are no epileptiform discharges and no electrographic seizures.  Electroencephalogram 4/01/19:  EEG FINDINGS:  The recording was obtained with a number of standard bipolar and referential montages during comatose state.  In this state, the background was diffusely suppressed and comprised of minimal cortical activity.  Spontaneous variability was noted.  Reactivity was absent.  No clear state changes were appreciated.  There were no interictal epileptiform abnormalities and no clinical or electrographic seizure recorded.   The EKG channel revealed sinus rhythm.   IMPRESSION:  This is an abnormal EEG during comatose state.  Diffuse suppression of the background with minimal cortical activity was noted.  CT Head Without Contrast 4/01/19: FINDINGS:  There is diffuse loss of gray-white matter differentiation and progressive loss of sulcation suggesting diffuse cerebral edema.  No CT findings to suggest an acute major vascular distribution infarct.  No hydrocephalus.  No midline shift or mass effect.  No intracranial hemorrhage.  No abnormal intra or extra-axial fluid collections.  Air-fluid levels noted throughout the paranasal sinuses, nonspecific in the setting of support devices.  There is irregularity involving the left medial orbital wall, likely related to previous trauma.  Mastoids are clear.  No acute osseous abnormalities.  Globes are symmetric.  Subcutaneous soft tissues are within normal limits.  Impression:   1. CT findings consistent with diffuse cerebral edema noting loss of gray-white matter differentiation and progressive loss of sulcation.  2. Diffuse air-fluid levels throughout the paranasal sinuses, nonspecific in the setting of support devices.

## 2019-04-02 NOTE — PLAN OF CARE
Please place on droplet isolation per protocol for + rhinovirus.  Contact Infection Control @ [phone] for questions.

## 2019-04-02 NOTE — PLAN OF CARE
Problem: Adult Inpatient Plan of Care  Goal: Plan of Care Review  Received pt on following settings in vent flow sheets. Alarms are set and functioning with adequate volumes. Ambu bag/mask at bs; will cont to monitor

## 2019-04-02 NOTE — PLAN OF CARE
Problem: Adult Inpatient Plan of Care  Goal: Plan of Care Review  Outcome: Ongoing (interventions implemented as appropriate)  Pt has some cranial nerve function, some spinal cord reflexes, and is decerebrate posturing. He has been kept comfortable on Precedex. VS has remained stable. As he continues with thermal regulation in the normothermic range via the arctic sun. Of not, he does have a healthy leukocytosis. INR and total mustapha are normal and serum cr has improved to 1.9 although he remains oliguric. No gross sz like activity noted on physical examination. EEG was performed today and also indicated no sz activity. L groin central line d/c. Peripheral line started. Plan for tomorrow Dr. Knapp and Dr. Grant to discuss palliative care, comfort measures, DNR, and possible withdraw of care at some point in the near future.    Results for NICOLAS SPRAGUE (MRN [id number]) as of 4/1/2019 19:38   Ref. Range 4/1/2019 04:17   WBC Latest Ref Range: 3.90 - 12.70 K/uL 18.36 (H)   RBC Latest Ref Range: 4.60 - 6.20 M/uL 4.45 (L)   Hemoglobin Latest Ref Range: 14.0 - 18.0 g/dL 12.3 (L)   Hematocrit Latest Ref Range: 40.0 - 54.0 % 38.6 (L)   MCV Latest Ref Range: 82 - 98 fL 87   MCH Latest Ref Range: 27.0 - 31.0 pg 27.6   MCHC Latest Ref Range: 32.0 - 36.0 g/dL 31.9 (L)   RDW Latest Ref Range: 11.5 - 14.5 % 15.7 (H)   Platelets Latest Ref Range: 150 - 350 K/uL 232   MPV Latest Ref Range: 9.2 - 12.9 fL 11.3   Gran% Latest Ref Range: 38.0 - 73.0 % 87.7 (H)   Gran # (ANC) Latest Ref Range: 1.8 - 7.7 K/uL 16.1 (H)   Lymph% Latest Ref Range: 18.0 - 48.0 % 5.4 (L)   Lymph # Latest Ref Range: 1.0 - 4.8 K/uL 1.0   Mono% Latest Ref Range: 4.0 - 15.0 % 5.9   Mono # Latest Ref Range: 0.3 - 1.0 K/uL 1.1 (H)   Eosinophil% Latest Ref Range: 0.0 - 8.0 % 0.1   Eos # Latest Ref Range: 0.0 - 0.5 K/uL 0.0   Basophil% Latest Ref Range: 0.0 - 1.9 % 0.1   Baso # Latest Ref Range: 0.00 - 0.20 K/uL 0.01   Differential Method Unknown Automated    Protime Latest Ref Range: 9.0 - 12.5 sec 10.0   Coumadin Monitoring INR Latest Ref Range: 0.8 - 1.2  1.0   aPTT Latest Ref Range: 21.0 - 32.0 sec 25.1     Results for NICOLAS SPRAGUE (MRN [id number]) as of 4/1/2019 19:38   Ref. Range 4/1/2019 04:17   Sodium Latest Ref Range: 136 - 145 mmol/L 148 (H)   Potassium Latest Ref Range: 3.5 - 5.1 mmol/L 4.9   Chloride Latest Ref Range: 95 - 110 mmol/L 115 (H)   CO2 Latest Ref Range: 23 - 29 mmol/L 26   Anion Gap Latest Ref Range: 8 - 16 mmol/L 7 (L)   BUN, Bld Latest Ref Range: 8 - 23 mg/dL 42 (H)   Creatinine Latest Ref Range: 0.5 - 1.4 mg/dL 1.3   eGFR if non African American Latest Ref Range: >60 mL/min/1.73 m^2 58 (A)   eGFR if African American Latest Ref Range: >60 mL/min/1.73 m^2 >60   Glucose Latest Ref Range: 70 - 110 mg/dL 139 (H)   Calcium Latest Ref Range: 8.7 - 10.5 mg/dL 9.2   Phosphorus Latest Ref Range: 2.7 - 4.5 mg/dL 2.9   Magnesium Latest Ref Range: 1.6 - 2.6 mg/dL 4.0 (H)   Alkaline Phosphatase Latest Ref Range: 55 - 135 U/L 62   Total Protein Latest Ref Range: 6.0 - 8.4 g/dL 6.6   Albumin Latest Ref Range: 3.5 - 5.2 g/dL 3.0 (L)   Total Bilirubin Latest Ref Range: 0.1 - 1.0 mg/dL 0.4   Bilirubin, Direct Latest Ref Range: 0.1 - 0.3 mg/dL 0.2   AST Latest Ref Range: 10 - 40 U/L 98 (H)   ALT Latest Ref Range: 10 - 44 U/L 73 (H)     DATE OF PROCEDURE:  04/01/2019.     EEG#:  OK-.     REFERRING PHYSICIAN:  Dr. Grant     This EEG was performed to assess for subclinical seizures.     ELECTROENCEPHALOGRAM REPORT     METHODOLOGY:  Electroencephalographic (EEG) recording is recorded with   electrodes placed according to the International 10-20 placement system.  Thirty   two (32) channels of digital signal (sampling rate of 512/sec), including T1   and T2, were simultaneously recorded from the scalp and may include EKG, EMG,   and/or eye monitors.  Recording band pass was 0.1 to 512 Hz.  Digital video   recording of the patient is simultaneously recorded  with the EEG.  The patient   is instructed to report clinical symptoms which may occur during the recording   session.  EEG and video recording are stored and archived in digital format.    Activation procedures, which include photic stimulation, hyperventilation and   instructing patients to perform simple tasks, are done in selected patients.     The EEG is displayed on a monitor screen and can be reviewed using different   montages.  Computer assisted-analysis is employed to detect spike and   electrographic seizure activity.   The entire record is submitted for computer   analysis.  The entire recording is visually reviewed, and the times identified   by computer analysis as being spikes or seizures are reviewed again.     Compressed spectral analysis (CSA) is also performed on the activity recorded   from each individual channel.  This is displayed as a power display of   frequencies from 0 to 30 Hz over time.   The CSA is reviewed looking for   asymmetries in power between homologous areas of the scalp, then compared with   the original EEG recording.     Evolver software was also utilized in the review of this study.  This software   suite analyzes the EEG recording in multiple domains.  Coherence and rhythmicity   are computed to identify EEG sections which may contain organized seizures.    Each channel undergoes analysis to detect the presence of spike and sharp waves   which have special and morphological characteristics of epileptic activity.  The   routine EEG recording is converted from special into frequency domain.  This is   then displayed comparing homologous areas to identify areas of significant   asymmetry.  Algorithm to identify non-cortically generated artifact is used to   separate artifact from the EEG.     EEG FINDINGS:  The recording was obtained with a number of standard bipolar and   referential montages during comatose state.  In this state, the background was   diffusely suppressed and  comprised of minimal cortical activity.  Spontaneous   variability was noted.  Reactivity was absent.  No clear state changes were   appreciated.  There were no interictal epileptiform abnormalities and no   clinical or electrographic seizure recorded.     The EKG channel revealed sinus rhythm.     IMPRESSION:  This is an abnormal EEG during comatose state.  Diffuse suppression   of the background with minimal cortical activity was noted.     CLINICAL CORRELATION:  The patient is a 63-year-old male who is currently   maintained on levetiracetam.  This is an abnormal EEG during comatose state.    The overall degree of suppression with minimal variability and lack of   reactivity is suggestive of a severe encephalopathy with guarded prognosis.    There is no evidence of an epileptic process on this recording.  Episodes of   head jerking were not associated with any abnormality on EEG.        FAK/HN  dd: 04/01/2019 14:14:04 (CDT)  td: 04/01/2019 14:29:03 (CDT)  Doc ID   #[id number]  Job ID #[id number]     CC:     Aj Rebolledo MD 4/1/2019       Narrative     EXAMINATION:  CT HEAD WITHOUT CONTRAST    CLINICAL HISTORY:  Focal neuro deficit, new, fixed or worsening, >6 hours;    TECHNIQUE:  5-mm axial images were obtained through the head without the use of contrast.  Coronal and sagittal reformats were performed.    COMPARISON:  CT 03/28/2019.    FINDINGS:  There is diffuse loss of gray-white matter differentiation and progressive loss of sulcation suggesting diffuse cerebral edema.    No CT findings to suggest an acute major vascular distribution infarct.  No hydrocephalus.  No midline shift or mass effect.  No intracranial hemorrhage.  No abnormal intra or extra-axial fluid collections.    Air-fluid levels noted throughout the paranasal sinuses, nonspecific in the setting of support devices.  There is irregularity involving the left medial orbital wall, likely related to previous trauma.  Mastoids are clear.  No acute  osseous abnormalities.  Globes are symmetric.  Subcutaneous soft tissues are within normal limits.      Impression       1. CT findings consistent with diffuse cerebral edema noting loss of gray-white matter differentiation and progressive loss of sulcation.  2. Diffuse air-fluid levels throughout the paranasal sinuses, nonspecific in the setting of support devices.  This report was flagged in Epic as abnormal.      Electronically signed by: Aj Rebolledo MD  Date: 04/01/2019  Time: 07:53

## 2019-04-02 NOTE — CONSULTS
Consult Note  Palliative Care      Consult Requested By: Chai Mann MD  Reason for Consult: Goals of Care/Advance Directives    SUBJECTIVE:     History of Present Illness:  Disease Process: Cardiac Arrest    Abhijit Marie is a 63 y.o. black man with hypertension, history of cigarette smoking (quit 9/28/18), and chronic obstructive pulmonary disease with history of ICU admission for exacerbation. He lives in [place], [place]. He has a fizinae with whom he has a 15 month old daughter named Shruthi. He works for [employer].              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 His oxygen saturation was 90% with tachypnea and blood pressure of 224/128. He was given albuterol, ipratropium, methylprednisolone, and hydralazine and discharged home after feeling somewhat better. He still had shortness of breath. When he went to work, he told his fizinae that he was having hot sweats. When he returned home, he felt weak. His fiancee went out to get him something to eat and drink around 16:30. When his fizinae returned home, she found him on the floor unresponsive. She could barely feel his pulse. EMS was called and found him to have pulseless electrical activity, and CPR was initiated at 17:56 pm. Pulse was regained at 18:11 pm. He had 4 doses of epinephrine and 1 dose of sodium bicarbonate given via interosseous line, and 100 cc of saliva/vomitues was suctioned from his airway. He was intubated. In the emergency department, he was found to have lactic acidosis, hypercapnia, acute kidney injury, acute hepatic injury, rhabdomyolysis. He had no neurologic responses. He was admitted to [facility]. He displayed myoclonic activity.     Palliative medicine meeting with family to establish rapport. Patient currently intubated and unresponsive. Daughter, hannah,  "and other relatives present for meeting. Family not well informed of patient current condition. Palliative medicine educated family on patient's current diagnosis/prognosis. Tearfully verbalized understanding. Emotional support given. Family concerned about patient current level of brain activity. Palliative Medicine discussed brain acitiviy and all treatments the patient has received and his responses. Discussed code status with family. Family made stated, "We need to prayerful make this decision before we given them the answer. We need time to think this over. Also we need to give Lawerence time to recover." Family very divided on what should be done. They would like him to remain full code. Discussed next level of care for patient. Family would like patient to be given time to see if he can pull out of this situation. Palliative medicine will schedule another family at the end of week to discuss GOC  Past Medical History:   Diagnosis Date    COPD (chronic obstructive pulmonary disease)     Diastolic dysfunction 10/15/2018    Hypertension     Reactive airway disease 2014     History reviewed. No pertinent surgical history.  Family History   Problem Relation Age of Onset    Heart disease Mother     Heart disease Father      Social History     Tobacco Use    Smoking status: Former Smoker     Packs/day: 1.00     Years: 40.00     Pack years: 40.00     Types: Cigarettes     Last attempt to quit: 2018     Years since quittin.5    Smokeless tobacco: Never Used   Substance Use Topics    Alcohol use: No    Drug use: No     Types: Marijuana       Mental Status: Non-responsive    ECOG Performance Status Grade: 4 - Completely disabled    Review of Systems:  intubated    OBJECTIVE:     Pain Assessment: No pain reported at this time    Decision-Making Capacity: Family answered questions, Patient unable to communicate due to disease severity/cognitive impairment    Advanced Directives:  Living Will: " No  Do Not Resuscitate Status: No  Medical Power of : No  Registered Organ Donor: No    Living Arrangements: Lives with friend    Psychosocial, Spiritual, Cultural:  Patient's most important priorities:  none    Patient's biggest concerns/fears:  none    Previous death/end of life care history:  none    Patient's goals/hopes:  none    ASSESSMENT/PLAN:     Full consult to follow after family meeting at 1pm today.     Recommendations:  Continue medical treatment  Code status:  Full Code  Palliative Medicine will meet with family again during the week to discuss GOC.    Thank you for the consult and the opportunity to participate in Mr. Marie's  care.       Vanessa Antonio, MSN, APRN, NP-C   Palliative Medicine   [facility]  [phone] or [phone]        >50% of  120 min visit spent in chart review, face to face discussion of goals of care with patient, family, symptom assessment, coordination of care and emotional support.   patient owns a straight cane/(0) independent

## 2019-04-02 NOTE — ASSESSMENT & PLAN NOTE
Chronic obstructive pulmonary disease with acute exacerbation  Acute respiratory failure with hypoxia and hypercarbia  Enterovirus and Rhinovirus rhinosinusitis  Uses albuterol nebulizer at home. Giving albuterol-ipratropium every 4 hours. Started on budesonide nebulizer treatments. Ventilator management. Appreciate Pulmonary assistance.

## 2019-04-02 NOTE — PLAN OF CARE
Recommendations     1. If consistent with goals of care: Rec TF of Isosource 1.5 initiated @ 10 ml/hr and advanced 10 ml q 4 hrs to goal rate of 65 ml/hr. Fluid flushes for normal intake: 200 ml q 4 hrs or per MD.   -TF to provide: 2340 kcal, 100g pro, 1192 ml fluid.   2. RD to f/u with goals of care and progress.     Goals: pending goals of care discussion  Nutrition Goal Status: new  Communication of RD Recs: reviewed with RN

## 2019-04-02 NOTE — PLAN OF CARE
Problem: Adult Inpatient Plan of Care  Goal: Plan of Care Review  Patient on  with documented settings.  Alarms are set and functioning with adequate volumes.  AMBU bag and mask at bedside.

## 2019-04-02 NOTE — NURSING
0854:  At bedside. Vent setting switched to spontaneous and paused the Precedex for SBT.     1035: Restarted Precedex due to , RR 30, O2 sats low 90's,      1200:  At bedside. Vent setting back to assist control with a rate of 20. /75, RR 25, .     1300: Family on unit to meet with MD about plan of care. No changes at this time.     1320: Pt given acetaminophen for temp of 100 degrees F.    1528: Precedex infusing at 0.6 mcg/kg/hr. Pt VS stable. Temp 98.8. Pt resting with no s/s of discomfort. Will continue to monitor.

## 2019-04-02 NOTE — PROGRESS NOTES
"[facility]  Adult Nutrition  Progress Note    SUMMARY       Recommendations    1. If consistent with goals of care: Rec TF of Isosource 1.5 initiated @ 10 ml/hr and advanced 10 ml q 4 hrs to goal rate of 65 ml/hr. Fluid flushes for normal intake: 200 ml q 4 hrs or per MD.   -TF to provide: 2340 kcal, 100g pro, 1192 ml fluid.   2. RD to f/u with goals of care and progress.    Goals: pending goals of care discussion  Nutrition Goal Status: new  Communication of RD Recs: reviewed with RN    Reason for Assessment    Reason For Assessment: NPO/clear liquids x 5 days  Diagnosis: (Cardiac Arrest)  Relevant Medical History: HTN, COPD  Interdisciplinary Rounds: did not attend    General Information Comments: Pt intubated with anoxic brain injury. Goals of care discussion today with family for possible withdrawal of care. Pt NPO Day 5. NFPE 4/2: WDL for fat and muscle mass.    Nutrition Discharge Planning: Too soon to determine    Nutrition Risk Screen    Nutrition Risk Screen: no indicators present    Nutrition/Diet History    Factors Affecting Nutritional Intake: NPO, on mechanical ventilation    Anthropometrics    Temp: 100 °F (37.8 °C)  Height Method: Estimated  Height: 6' 4" (193 cm)  Height (inches): 76 in  Weight Method: Bed Scale  Weight: 98.9 kg (218 lb 0.6 oz)  Weight (lb): 218.04 lb  Ideal Body Weight (IBW), Male: 202 lb  % Ideal Body Weight, Male (lb): 107.94 lb  BMI (Calculated): 26.6  BMI Grade: 18.5-24.9 - normal       Lab/Procedures/Meds    Pertinent Labs Reviewed: reviewed  Pertinent Labs Comments: Na 146 Cl 114; K 5.2; Mg 3.2  Pertinent Medications Reviewed: reviewed  Pertinent Medications Comments: methylprednisone, precedex    Estimated/Assessed Needs    Weight Used For Calorie Calculations: 98.9 kg (218 lb 0.6 oz)  Energy Calorie Requirements (kcal): 2296 kcal  Energy Need Method: [facility]  Protein Requirements: 99-119g (1-1.2g/kg)  Weight Used For Protein Calculations: 98.9 kg (218 " lb 0.6 oz)      RDA Method (mL): 2296       Nutrition Prescription Ordered    Current Diet Order: NPO    Evaluation of Received Nutrient/Fluid Intake    Energy Calories Required: not meeting needs  Protein Required: not meeting needs  Fluid Required: (per MD)  Comments: LBM: 3/28    % Meal Intake: NPO    Nutrition Risk    Level of Risk/Frequency of Follow-up: (2 x week)     Assessment and Plan     Nutrition Problem  Inadequate energy intake    Related to (etiology):   Mechanical ventilation    Signs and Symptoms (as evidenced by):   NPO without nutrition support    Interventions  Collaboration with providers    Nutrition Diagnosis Status:   New        Monitor and Evaluation    Food and Nutrient Intake: energy intake, enteral nutrition intake  Food and Nutrient Adminstration: diet order, enteral and parenteral nutrition administration  Anthropometric Measurements: weight, weight change  Biochemical Data, Medical Tests and Procedures: electrolyte and renal panel  Nutrition-Focused Physical Findings: overall appearance     Malnutrition Assessment       Subcutaneous Fat Loss (Final Summary): well nourished  Muscle Loss Evaluation (Final Summary): well nourished         Nutrition Follow-Up    RD Follow-up?: Yes

## 2019-04-02 NOTE — PROGRESS NOTES
[facility]  Progress Note    Patient Name: Abhijit Marie  MRN: [id number]  Patient Class: IP- Inpatient   Admission Date: 3/28/2019  Length of Stay: 5 days  Attending Physician: Chai Mann MD  Primary Care Provider: Primary Doctor No        Subjective:     Principal Problem:Cardiac arrest    HPI:  Abhijit Marie is a 63 y.o. black man with hypertension, history of cigarette smoking (quit 9/28/18), and chronic obstructive pulmonary disease with history of ICU admission for exacerbation. He lives in [place], [place]. He has a fiancee with whom he has a 15 month old daughter named Shruthi. He works for [employer].   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 His oxygen saturation was 90% with tachypnea and blood pressure of 224/128. He was given albuterol, ipratropium, methylprednisolone, and hydralazine and discharged home after feeling somewhat better. He still had shortness of breath. When he went to work, he told his fiancee that he was having hot sweats. When he returned home, he felt weak. His fiancee went out to get him something to eat and drink around 16:30. When his fizinae returned home, she found him on the floor unresponsive. She could barely feel his pulse. EMS was called and found him to have pulseless electrical activity, and CPR was initiated at 17:56 pm. Pulse was regained at 18:11 pm. He had 4 doses of epinephrine and 1 dose of sodium bicarbonate given via interosseous line, and 100 cc of saliva/vomitues was suctioned from his airway. He was intubated. In the emergency department, he was found to have lactic acidosis, hypercapnia, acute kidney injury, acute hepatic injury, rhabdomyolysis. He had no neurologic responses. He was admitted to [facility]. He displayed myoclonic activity.     [facility]  "Course:  He was put on therapeutic hypothermia and reached goal temperature of < 33°  C around 04:00 on 3/29/19. He was maintained at that temperature for 24 hours prior to starting the rewarming process. EEG showed "a burst suppressed background with generalized background suppressions lasting up to 5 min ... consistent with a severe encephalopathy" with no epileptiform activity, consistent with anoxic brain injury. Repeat CT head showed "diffuse cerebral edema noting loss of gray-white matter differentiation and progressive loss of sulcation." EEG on 4/1/19 showed that the "overall degree of suppression with minimal variability and lack of reactivity is suggestive of a severe encephalopathy with guarded prognosis" with "no evidence of an epileptic process on this recording. Episodes of head jerking were not associated with any abnormality on EEG." Propofol was discontinued and he was started on dexmedetomidine for agitation to avoid the respiratory and neurologic suppression. Respiratory viral panel was positive for Enterovirus and Rhinovirus. Respiratory culture had no growth. Head CT showed diffuse cerebral edema.    Interval History: Palliative Care had family meeting.    Review of Systems   Unable to perform ROS: Patient unresponsive     Objective:     Vital Signs (Most Recent):  Temp: 100 °F (37.8 °C) (04/02/19 1315)  Pulse: 100 (04/02/19 1315)  Resp: (!) 29 (04/02/19 1315)  BP: 134/75 (04/02/19 1315)  SpO2: (!) 92 % (04/02/19 1327) Vital Signs (24h Range):  Temp:  [97 °F (36.1 °C)-100 °F (37.8 °C)] 100 °F (37.8 °C)  Pulse:  [] 100  Resp:  [20-42] 29  SpO2:  [91 %-97 %] 92 %  BP: (128-198)/() 134/75     Weight: 98.9 kg (218 lb 0.6 oz)  Body mass index is 26.54 kg/m².    Intake/Output Summary (Last 24 hours) at 4/2/2019 1346  Last data filed at 4/2/2019 1305  Gross per 24 hour   Intake 1621.84 ml   Output 2767 ml   Net -1145.16 ml      Physical Exam   Constitutional: He appears well-developed. No " distress. He is intubated.   Eyes:   Eyes moving side to side   Pulmonary/Chest: He is intubated. He has rhonchi.   Abdominal: Soft. There is no tenderness.   Neurological: He is unresponsive. He displays no tremor.   Nursing note and vitals reviewed.      Significant Labs: All pertinent labs within the past 24 hours have been reviewed.    Significant Imaging: I have reviewed all pertinent imaging results/findings within the past 24 hours.   TRANSTHORACIC ECHO (TTE) COMPLETE 3/29/19:  · Mild concentric left ventricular hypertrophy.  · Small left ventricular cavity size.  · Normal left ventricular systolic function. The estimated ejection fraction is 55%  · Grade I (mild) left ventricular diastolic dysfunction consistent with impaired relaxation.  · Elevated left atrial pressure.  · Normal right ventricular systolic function.  · Intermediate central venous pressure (8 mm Hg).  · The estimated PA systolic pressure is 28 mm Hg  Electroencephalogram 3/29/19:   Impression: This is an abnormal routine EEG because of a burst suppressed background with generalized background suppressions lasting up to 5 min.  This is consistent with a severe encephalopathy.  This finding is nonspecific with regards to etiology but can be seen in the setting of toxic/metabolic derangements, infection, as a medication effect, and in anoxia.  There are no epileptiform discharges and no electrographic seizures.  Electroencephalogram 4/01/19:  EEG FINDINGS:  The recording was obtained with a number of standard bipolar and referential montages during comatose state.  In this state, the background was diffusely suppressed and comprised of minimal cortical activity.  Spontaneous variability was noted.  Reactivity was absent.  No clear state changes were appreciated.  There were no interictal epileptiform abnormalities and no clinical or electrographic seizure recorded.   The EKG channel revealed sinus rhythm.   IMPRESSION:  This is an abnormal EEG  during comatose state.  Diffuse suppression of the background with minimal cortical activity was noted.  CT Head Without Contrast 4/01/19: FINDINGS:  There is diffuse loss of gray-white matter differentiation and progressive loss of sulcation suggesting diffuse cerebral edema.  No CT findings to suggest an acute major vascular distribution infarct.  No hydrocephalus.  No midline shift or mass effect.  No intracranial hemorrhage.  No abnormal intra or extra-axial fluid collections.  Air-fluid levels noted throughout the paranasal sinuses, nonspecific in the setting of support devices.  There is irregularity involving the left medial orbital wall, likely related to previous trauma.  Mastoids are clear.  No acute osseous abnormalities.  Globes are symmetric.  Subcutaneous soft tissues are within normal limits.  Impression:   1. CT findings consistent with diffuse cerebral edema noting loss of gray-white matter differentiation and progressive loss of sulcation.  2. Diffuse air-fluid levels throughout the paranasal sinuses, nonspecific in the setting of support devices.        Assessment/Plan:      * Cardiac arrest  Lactic acidosis  Ischemic hepatitis  Abnormal liver enzymes  Anoxic brain injury  Cerebral edema due to anoxia  Myoclonic jerking  Leukocytosis  Had hypothermia protocol. Continue supportive care. Giving levetiracetam but no seizure activity, only anoxic brain injury. Give dexamethasone for cerebral edema. Consult LTAC.    Goals of care, counseling/discussion  Spoke with daughter, Nilay, this afternoon about current status. He still has corneal reflex and is breathing over the ventilator, so does not meet brain death at this time. Told her that his current state is likely to be the best he will be going forward, and that he would require 24 hour care with trach and PEG in a facility. She does not think that he would have wanted that. She is going to talk with her sister and brother and her dad's siblings  as well about this. Will consult Palliative Care for continued assistance with goals of care discussions and likely transition to hospice for terminal extubation.     Hypernatremia  Give 1/2NS x 1 liter.     Upper GI bleeding  Potential trauma related to OG tube. Continue PPI IV BID. Hgb remains stable around 12. Continue to monitor. Hold lovenox.     Essential hypertension  Takes hydrochlorothiazide, amlodipine at home. Continue home amlodipine.    Cigarette nicotine dependence without complication  Quit last year.    Chronic obstructive pulmonary disease  Chronic obstructive pulmonary disease with acute exacerbation  Acute respiratory failure with hypoxia and hypercarbia  Enterovirus and Rhinovirus rhinosinusitis  Uses albuterol nebulizer at home. Giving albuterol-ipratropium every 4 hours. Started on budesonide nebulizer treatments. Ventilator management. Appreciate Pulmonary assistance.       VTE Risk Mitigation (From admission, onward)        Ordered     IP VTE HIGH RISK PATIENT  Once      03/28/19 2229     Place sequential compression device  Until discontinued      03/28/19 1943          Critical care time spent on the evaluation and treatment of severe organ dysfunction, review of pertinent labs and imaging studies, discussions with consulting providers and discussions with patient/family: 40 minutes.    Chai Mann MD  Department of Hospital Medicine   [facility]

## 2019-04-02 NOTE — PROGRESS NOTES
[facility]  Critical Care - Medicine  Progress Note    Patient Name: Abhijit Marie  MRN: [id number]  Admission Date: 3/28/2019  Hospital Length of Stay: 5 days  Code Status: Full Code  Attending Provider: Chai Mann MD  Primary Care Provider: Primary Doctor No   Principal Problem: Cardiac arrest    Subjective:     Interval history:  63 male with HTN, HFpEF, COPD, who is currently admitted to the MICU following a hypoxemic/hypercarbic PEA arrest that was unwitnessed.  He was last normal 3/28 at 4:30pm and CPR was started at 5:56PM the same day.  Exact downtime is unknown.  He was admitted to the MICU and underwent cooling protocol, and is currently being rewarmed.  He is on keppra for anti-epileptic therapy (did have EEG which was negative for active seizures).  He is having evidence of probable myclonus activity manifested by facial twitching.  He is on propofol for sedation. He initially had a respiratory acidosis that has since corrected with mechanical ventilation.  He is not responding to noxious stimuli.  He completed rewarming the AM of 8/30.Since rewarming, pt's CT head showed severe cerebral edema. EEG was done which showed comatose state. Pt does have corneal reflex and gag reflex and breaths over the vent but doesn't withdraw from pain and no purposeful movements.      Review of Systems   Unable to perform ROS: Intubated   Constitutional: Negative for activity change, appetite change, chills, diaphoresis and fatigue.   HENT: Negative.    Eyes: Negative.    Respiratory: Negative.    Cardiovascular: Negative.    Gastrointestinal: Negative.    Genitourinary: Negative.      Objective:     Vital Signs (Most Recent):  Temp: 98.1 °F (36.7 °C) (04/02/19 0910)  Pulse: 92 (04/02/19 0910)  Resp: (!) 26 (04/02/19 0910)  BP: (!) 160/89 (04/02/19 0905)  SpO2: (!) 93 % (04/02/19 0910) Vital Signs (24h Range):  Temp:  [97 °F (36.1 °C)-98.6 °F (37 °C)] 98.1 °F (36.7 °C)  Pulse:  [] 92  Resp:   [20-37] 26  SpO2:  [93 %-96 %] 93 %  BP: (119-198)/() 160/89     Weight: 98.9 kg (218 lb 0.6 oz)  Body mass index is 26.54 kg/m².      Intake/Output Summary (Last 24 hours) at 4/2/2019 1027  Last data filed at 4/2/2019 1005  Gross per 24 hour   Intake 1300.17 ml   Output 2219 ml   Net -918.83 ml       Physical Exam   Constitutional: He appears well-developed and well-nourished.   Appears younger than age. Off sedation, calm.   HENT:   Head: Normocephalic and atraumatic.   Mouth/Throat: No oropharyngeal exudate.   Eyes: No scleral icterus.   Pupils 3 mm. But equal, round, reactive to light.   Neck: Normal range of motion. Neck supple. No JVD present.   Cardiovascular: Normal rate, regular rhythm, normal heart sounds and intact distal pulses. Exam reveals no gallop and no friction rub.   No murmur heard.  Pulmonary/Chest: No respiratory distress. He has wheezes. He has no rales.   Mechanical ventilation   Abdominal: Soft. Bowel sounds are normal. He exhibits no distension.   Musculoskeletal: He exhibits edema.   Lymphadenopathy:     He has no cervical adenopathy.   Neurological:   Off sedation. + pupil constriction, + cough with gag. Doesn't withdraw to pain.     Skin: Skin is warm and dry. Capillary refill takes less than 2 seconds.       Vents:  Vent Mode: A/C (04/02/19 0747)  Ventilator Initiated: Yes (03/28/19 2001)  Set Rate: 20 bmp (04/02/19 0747)  Vt Set: 520 mL (04/02/19 0910)  Pressure Support: 5 cmH20 (04/02/19 0910)  PEEP/CPAP: 5 cmH20 (04/02/19 0910)  Oxygen Concentration (%): 30 (04/02/19 0910)  Peak Airway Pressure: 10 cmH2O (04/02/19 0910)  Total Ve: 12.9 mL (04/02/19 0910)  F/VT Ratio<105 (RSBI): (!) 51.49 (04/02/19 0910)    Lines/Drains/Airways     Drain                 NG/OG Tube 03/29/19 0346 orogastric 18 Fr. Right mouth 4 days         Urethral Catheter 04/01/19 2200 Straight-tip 16 Fr. less than 1 day          Airway                 Airway - Non-Surgical 03/28/19 1835 Endotracheal Tube 4  days          Peripheral Intravenous Line                 Peripheral IV - Single Lumen 03/28/19 1839 Left Hand 4 days         Peripheral IV - Single Lumen 04/01/19 1645 Right Antecubital less than 1 day                Significant Labs:    CBC/Anemia Profile:  Recent Labs   Lab 04/01/19 0417 04/02/19  0451   WBC 18.36* 14.38*   HGB 12.3* 12.1*   HCT 38.6* 38.0*    219   MCV 87 88   RDW 15.7* 15.8*        Chemistries:  Recent Labs   Lab 04/01/19 0417 04/02/19  0451   * 146*   K 4.9 5.2*   * 114*   CO2 26 25   BUN 42* 39*   CREATININE 1.3 1.0   CALCIUM 9.2 8.9   ALBUMIN 3.0* 2.7*   PROT 6.6 6.2   BILITOT 0.4 0.4   ALKPHOS 62 58   ALT 73* 54*   AST 98* 82*   MG 4.0* 3.2*   PHOS 2.9 3.2       All pertinent labs within the past 24 hours have been reviewed.    Significant Imaging:  I have reviewed all pertinent imaging results/findings within the past 24 hours.    Assessment/Plan:     Active Diagnoses:    Diagnosis Date Noted POA    PRINCIPAL PROBLEM:  Cardiac arrest [I46.9] 03/28/2019 Yes    Hypernatremia [E87.0] 04/01/2019 No    Cerebral edema due to anoxia [G93.6, R09.02] 04/01/2019 Yes    Goals of care, counseling/discussion [Z71.89] 04/01/2019 Not Applicable    Upper GI bleeding [K92.2] 03/30/2019 Yes    Abnormal liver enzymes [R74.8] 03/29/2019 Yes    Leukocytosis [D72.829] 03/29/2019 Yes    Ischemic hepatitis [K75.9] 03/28/2019 Yes    Acute respiratory failure with hypoxia and hypercarbia [J96.01, J96.02] 03/28/2019 Yes    Anoxic brain injury [G93.1] 03/28/2019 Yes    Myoclonic jerking [G25.3] 03/28/2019 Yes    Essential hypertension [I10] 12/13/2015 Yes     Chronic    Chronic obstructive pulmonary disease with acute exacerbation [J44.1] 12/13/2015 Yes    Chronic obstructive pulmonary disease [J44.9] 12/02/2014 Yes     Chronic    Cigarette nicotine dependence without complication [F17.210] 12/02/2014 Yes     Chronic      Problems Resolved During this Admission:    Diagnosis Date  Noted Date Resolved POA    Acute tubular necrosis [N17.0] 03/28/2019 04/02/2019 Yes    Lactic acidosis [E87.2] 03/28/2019 04/01/2019 Yes    Hyperkalemia, diminished renal excretion [E87.5] 03/28/2019 03/30/2019 Yes    Non-traumatic rhabdomyolysis [M62.82] 03/28/2019 04/02/2019 Yes       1) Acute hypoxemic/hypercarbic respiratory failure and PEA Arrest: s/p rewarming. Currently day 4 s/p PEA arrest. Pt does have pupil restriction, + gag, is breathing over the vent. Although thought brain dead, CT head done 3/31 showed diffuse cerebral edema.   - continue mechanical ventilation at current settings  - currently on precedex for agitation. Precedex doesn't lower respiratory drive, and thus better choice than propofol  - pt needs daily SAT and SBT    2) HFpEF  - off pressors  - strongly recommend primary team start pt on anti-hypertensive agents. Pt's SBP was 180 this morning before getting IV hydralazine  - patient is +2L since admission. May need diuresing.    3) COPD  - continue duonebs q4hr for now, wheezing noted on exam.  - continue budesonide neb for now  - continue 40mg methylpred daily    4) concern for anoxic brain injury   - CT head on 3/31 showing diffuse cerebral edema  - EEG showed comatose state  - pt currently on keppra 1000mg bid. Unsure exactly why? Pt's EEG from 3/29 showed severe encephalopathy but no epileptiform discharges. Keppra can cloud neurologic picture as it's sedating. Repeat EEG saw his twitching however there were no neurologic activity to correlate with that. Thus no epileptic movements seen. Recommend stopping keppra    5) Upper GI bleed  - continue PPI BID  - if HGB changes or drops, may benefit from GI consultation. Currently stable  - holding pharmalogical DVT prophylaxis. On SCDs       Martín Osorio MD  Critical Care - Medicine  [facility]  [phone]

## 2019-04-02 NOTE — PLAN OF CARE
Problem: Adult Inpatient Plan of Care  Goal: Plan of Care Review  Outcome: Ongoing (interventions implemented as appropriate)  SBT initiated with MD at bedside. Pt failed d/t increase in VS. VS stable remainder of shift. Pt has positive corneal reflexes, no cough, no gag reflex, and does not respond to pain. Family came to unit to discuss plan of care with MD. No changes at this time. Average output about 100cc/hr. No signs of distress or discomfort. Will continue to monitor.    Problem: Inability to Wean (Mechanical Ventilation, Invasive)  Goal: Mechanical Ventilation Liberation    Intervention: Promote Extubation and Mechanical Ventilation Liberation     03/30/19 1702 04/02/19 1105 04/02/19 1505   Prevent or Manage Pain   Sleep/Rest Enhancement awakenings minimized;music provided;relaxation techniques promoted;therapeutic touch utilized  --   --    Monitor/Manage Chemotherapy Gastrointestinal Effects   Environmental Support  --  calm environment promoted;distractions minimized;environmental consistency promoted;rest periods encouraged  --    Manage Acute Allergic Reaction   Medication Review/Management  --   --  medications reviewed;high risk medications identified;infusion titrated         Problem: Skin Injury Risk Increased  Goal: Skin Health and Integrity    Intervention: Optimize Skin Protection     04/02/19 1505   Prevent Additional Skin Injury   Head of Bed (HOB) HOB at 30 degrees   Pressure Reduction Devices pressure-redistributing mattress utilized   Pressure Reduction Techniques heels elevated off bed;frequent weight shift encouraged;weight shift assistance provided   Monitor and Manage Hypervolemia   Skin Protection silicone foam dressing in place

## 2019-04-02 NOTE — ASSESSMENT & PLAN NOTE
Lactic acidosis  Ischemic hepatitis  Abnormal liver enzymes  Anoxic brain injury  Cerebral edema due to anoxia  Myoclonic jerking  Leukocytosis  Had hypothermia protocol. Continue supportive care. Giving levetiracetam but no seizure activity, only anoxic brain injury. Give dexamethasone for cerebral edema. Consult LTAC.

## 2019-04-03 PROBLEM — K92.2 UPPER GI BLEEDING: Status: RESOLVED | Noted: 2019-01-01 | Resolved: 2019-01-01

## 2019-04-03 PROBLEM — K72.00 ISCHEMIC HEPATITIS: Status: RESOLVED | Noted: 2019-01-01 | Resolved: 2019-01-01

## 2019-04-03 PROBLEM — R74.8 ABNORMAL LIVER ENZYMES: Status: RESOLVED | Noted: 2019-01-01 | Resolved: 2019-01-01

## 2019-04-03 PROBLEM — E87.0 HYPERNATREMIA: Status: RESOLVED | Noted: 2019-01-01 | Resolved: 2019-01-01

## 2019-04-03 NOTE — SUBJECTIVE & OBJECTIVE
Interval History: Had hyperkalemia treated overnight.    Review of Systems   Unable to perform ROS: Patient unresponsive     Objective:     Vital Signs (Most Recent):  Temp: 97.3 °F (36.3 °C) (04/03/19 1200)  Pulse: 93 (04/03/19 1215)  Resp: (!) 24 (04/03/19 1215)  BP: (!) 142/91 (04/03/19 1215)  SpO2: (!) 92 % (04/03/19 1215) Vital Signs (24h Range):  Temp:  [96.4 °F (35.8 °C)-100 °F (37.8 °C)] 97.3 °F (36.3 °C)  Pulse:  [] 93  Resp:  [17-36] 24  SpO2:  [87 %-98 %] 92 %  BP: (112-165)/() 142/91     Weight: 98.9 kg (218 lb 0.6 oz)  Body mass index is 26.54 kg/m².    Intake/Output Summary (Last 24 hours) at 4/3/2019 1242  Last data filed at 4/3/2019 1200  Gross per 24 hour   Intake 841.67 ml   Output 2372 ml   Net -1530.33 ml      Physical Exam   Constitutional: He appears well-developed. No distress. He is intubated.   Eyes:   Eyes moving side to side   Pulmonary/Chest: He is intubated. He has rhonchi.   Abdominal: Soft. There is no tenderness.   Neurological: He is unresponsive. He displays no tremor.   Nursing note and vitals reviewed.      Significant Labs: All pertinent labs within the past 24 hours have been reviewed.    Significant Imaging: I have reviewed all pertinent imaging results/findings within the past 24 hours.

## 2019-04-03 NOTE — PLAN OF CARE
Problem: Adult Inpatient Plan of Care  Goal: Plan of Care Review  Outcome: Ongoing (interventions implemented as appropriate)  On documented flowsheet settings. Per MD order Albuterol treatment at a dose of 2.5mg given but clarified to give another 7.5mg for a total of 10mg via continuous nebulizer for Hyperkalemia. RN aware.

## 2019-04-03 NOTE — NURSING
Pt had uneventful night. Notified ally BAEZA of elevated potassium. kayexelate given per order. VSS. Corneal only reflex present.. O2 sats 88 on 30%, increased o2 to 40% with sats increasing to 93%.  Called eICU for orders for potassium of 5.8. Orders given. Will continue to monitor

## 2019-04-03 NOTE — PROGRESS NOTES
[facility] Medicine  Progress Note    Patient Name: Abhijit Marie  MRN: [id number]  Patient Class: IP- Inpatient   Admission Date: 3/28/2019  Length of Stay: 6 days  Attending Physician: Chai Mann MD  Primary Care Provider: Primary Doctor No        Subjective:     Principal Problem:Cardiac arrest    HPI:  Abhijit Marie is a 63 y.o. black man with hypertension, history of cigarette smoking (quit 9/28/18), and chronic obstructive pulmonary disease with history of ICU admission for exacerbation. He lives in [place], [place]. He has a fiancee with whom he has a 15 month old daughter named Shruthi. He works for [employer].   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 His oxygen saturation was 90% with tachypnea and blood pressure of 224/128. He was given albuterol, ipratropium, methylprednisolone, and hydralazine and discharged home after feeling somewhat better. He still had shortness of breath. When he went to work, he told his fiancee that he was having hot sweats. When he returned home, he felt weak. His fiancee went out to get him something to eat and drink around 16:30. When his fizinae returned home, she found him on the floor unresponsive. She could barely feel his pulse. EMS was called and found him to have pulseless electrical activity, and CPR was initiated at 17:56 pm. Pulse was regained at 18:11 pm. He had 4 doses of epinephrine and 1 dose of sodium bicarbonate given via interosseous line, and 100 cc of saliva/vomitues was suctioned from his airway. He was intubated. In the emergency department, he was found to have lactic acidosis, hypercapnia, acute kidney injury, acute hepatic injury, rhabdomyolysis. He had no neurologic responses. He was admitted to [facility]. He displayed myoclonic activity.     [facility]  "Course:  He was put on therapeutic hypothermia and reached goal temperature of < 33°  C around 04:00 on 3/29/19. He was maintained at that temperature for 24 hours prior to starting the rewarming process. EEG showed "a burst suppressed background with generalized background suppressions lasting up to 5 min ... consistent with a severe encephalopathy" with no epileptiform activity, consistent with anoxic brain injury. Repeat CT head showed "diffuse cerebral edema noting loss of gray-white matter differentiation and progressive loss of sulcation." EEG on 4/1/19 showed that the "overall degree of suppression with minimal variability and lack of reactivity is suggestive of a severe encephalopathy with guarded prognosis" with "no evidence of an epileptic process on this recording. Episodes of head jerking were not associated with any abnormality on EEG." Propofol was discontinued and he was started on dexmedetomidine for agitation to avoid the respiratory and neurologic suppression. Respiratory viral panel was positive for Enterovirus and Rhinovirus. Respiratory culture had no growth. Head CT showed diffuse cerebral edema.    Interval History: Had hyperkalemia treated overnight.    Review of Systems   Unable to perform ROS: Patient unresponsive     Objective:     Vital Signs (Most Recent):  Temp: 97.3 °F (36.3 °C) (04/03/19 1200)  Pulse: 93 (04/03/19 1215)  Resp: (!) 24 (04/03/19 1215)  BP: (!) 142/91 (04/03/19 1215)  SpO2: (!) 92 % (04/03/19 1215) Vital Signs (24h Range):  Temp:  [96.4 °F (35.8 °C)-100 °F (37.8 °C)] 97.3 °F (36.3 °C)  Pulse:  [] 93  Resp:  [17-36] 24  SpO2:  [87 %-98 %] 92 %  BP: (112-165)/() 142/91     Weight: 98.9 kg (218 lb 0.6 oz)  Body mass index is 26.54 kg/m².    Intake/Output Summary (Last 24 hours) at 4/3/2019 1242  Last data filed at 4/3/2019 1200  Gross per 24 hour   Intake 841.67 ml   Output 2372 ml   Net -1530.33 ml      Physical Exam   Constitutional: He appears well-developed. " No distress. He is intubated.   Eyes:   Eyes moving side to side   Pulmonary/Chest: He is intubated. He has rhonchi.   Abdominal: Soft. There is no tenderness.   Neurological: He is unresponsive. He displays no tremor.   Nursing note and vitals reviewed.      Significant Labs: All pertinent labs within the past 24 hours have been reviewed.    Significant Imaging: I have reviewed all pertinent imaging results/findings within the past 24 hours.     Assessment/Plan:      * Cardiac arrest  Anoxic brain injury  Cerebral edema due to anoxia  Myoclonic jerking  Leukocytosis  Had hypothermia protocol. Continue supportive care. Giving dexamethasone for cerebral edema. Will need tracheostomy prior to transfer to LTAC. If stable tomorrow, will plan for this.    Goals of care, counseling/discussion  Spoke with daughter, Nilay, this afternoon about current status. He still has corneal reflex and is breathing over the ventilator, so does not meet brain death at this time. Told her that his current state is likely to be the best he will be going forward, and that he would require 24 hour care with trach and PEG in a facility. She does not think that he would have wanted that. She is going to talk with her sister and brother and her dad's siblings as well about this. Will consult Palliative Care for continued assistance with goals of care discussions and likely transition to hospice for terminal extubation.     Essential hypertension  Takes hydrochlorothiazide, amlodipine at home. Continue home amlodipine.    Cigarette nicotine dependence without complication  Quit last year.    Chronic obstructive pulmonary disease  Chronic obstructive pulmonary disease with acute exacerbation  Acute respiratory failure with hypoxia and hypercarbia  Enterovirus and Rhinovirus rhinosinusitis  Uses albuterol nebulizer at home. Giving albuterol-ipratropium every 4 hours. Started on budesonide nebulizer treatments. Ventilator management. Appreciate  Pulmonary assistance.       VTE Risk Mitigation (From admission, onward)        Ordered     enoxaparin injection 40 mg  Daily      04/03/19 0934     IP VTE HIGH RISK PATIENT  Once      03/28/19 2229     Place sequential compression device  Until discontinued      03/28/19 1943          Critical care time spent on the evaluation and treatment of severe organ dysfunction, review of pertinent labs and imaging studies, discussions with consulting providers and discussions with patient/family: 35 minutes.    Chai Mann MD  Department of Hospital Medicine   [facility]

## 2019-04-03 NOTE — PROGRESS NOTES
Pt condition unchanged from previous shift. Corneal and pupillary reflexes present, but cough, gag, pain reflexes not. TF added today. Pt tolerating well. IVF added as well. Family intermittently at the bedside. Safety maintained with side rails up x3, head of bed at 30 degrees, pt turned Q2 hours for skin protection.

## 2019-04-03 NOTE — ASSESSMENT & PLAN NOTE
Anoxic brain injury  Cerebral edema due to anoxia  Myoclonic jerking  Leukocytosis  Had hypothermia protocol. Continue supportive care. Giving dexamethasone for cerebral edema. Will need tracheostomy prior to transfer to LTAC. If stable tomorrow, will plan for this.

## 2019-04-03 NOTE — PROGRESS NOTES
[facility]  Critical Care - Medicine  Progress Note    Patient Name: Abhijit Marie  MRN: [id number]  Admission Date: 3/28/2019  Hospital Length of Stay: 6 days  Code Status: Full Code  Attending Provider: Chai Mann MD  Primary Care Provider: Primary Doctor No   Principal Problem: Cardiac arrest    Subjective:     Interval history:  63 male with HTN, HFpEF, COPD, who is currently admitted to the MICU following a hypoxemic/hypercarbic PEA arrest that was unwitnessed.  He was last normal 3/28 at 4:30pm and CPR was started at 5:56PM the same day.  Exact downtime is unknown.  He was admitted to the MICU and underwent cooling protocol, and is currently being rewarmed.  He is on keppra for anti-epileptic therapy (did have EEG which was negative for active seizures).  He is having evidence of probable myclonus activity manifested by facial twitching.  He is on propofol for sedation. He initially had a respiratory acidosis that has since corrected with mechanical ventilation.  He is not responding to noxious stimuli.  He completed rewarming the AM of 3/30.Since rewarming, pt's CT head 4/1 showed diffuse cerebral edema. EEG was done 4/1 which showed comatose state, diffuse suppression with minimal cortical activity. Pt does have corneal reflex and gag reflex and breaths over the vent but doesn't withdraw from pain and no purposeful movements.    Overnight pt was noted to be hyperkalemic and EICU was called who administered insulin/dextrose, kayexelate, calcium gluconate and albuterol. There were no ekg changes however. Additionally, this morning pt noted to be acutely hypoxemic with increasing oxygen requirements.      Review of Systems   Unable to perform ROS: Intubated   Constitutional: Negative for activity change, appetite change, chills, diaphoresis and fatigue.   HENT: Negative.    Eyes: Negative.    Respiratory: Negative.    Cardiovascular: Negative.    Gastrointestinal: Negative.     Genitourinary: Negative.      Objective:     Vital Signs (Most Recent):  Temp: 97.6 °F (36.4 °C) (04/03/19 0710)  Pulse: 98 (04/03/19 0930)  Resp: (!) 25 (04/03/19 0930)  BP: 121/81 (04/03/19 0930)  SpO2: (!) 89 % (04/03/19 0930) Vital Signs (24h Range):  Temp:  [96.4 °F (35.8 °C)-100 °F (37.8 °C)] 97.6 °F (36.4 °C)  Pulse:  [] 98  Resp:  [17-42] 25  SpO2:  [87 %-98 %] 89 %  BP: (112-191)/() 121/81     Weight: 98.9 kg (218 lb 0.6 oz)  Body mass index is 26.54 kg/m².      Intake/Output Summary (Last 24 hours) at 4/3/2019 0938  Last data filed at 4/3/2019 0900  Gross per 24 hour   Intake 745.92 ml   Output 2707 ml   Net -1961.08 ml       Physical Exam   Constitutional: He appears well-developed and well-nourished.   Appears younger than age. Off sedation, calm.   HENT:   Head: Normocephalic and atraumatic.   Mouth/Throat: No oropharyngeal exudate.   Eyes: No scleral icterus.   Pupils 3 mm. But equal, round, reactive to light.   Neck: Normal range of motion. Neck supple. No JVD present.   Cardiovascular: Normal rate, regular rhythm, normal heart sounds and intact distal pulses. Exam reveals no gallop and no friction rub.   No murmur heard.  Pulmonary/Chest: No respiratory distress. He has no wheezes. He has no rales.   Mechanical ventilation. There is significantly diminished lung volumes on the right compared to the left.   Abdominal: Soft. Bowel sounds are normal. He exhibits no distension.   Lymphadenopathy:     He has no cervical adenopathy.   Neurological:   Off sedation. + pupil constriction, + cough with gag. Doesn't withdraw to pain.     Skin: Skin is warm and dry. Capillary refill takes less than 2 seconds.       Vents:  Vent Mode: A/C (04/03/19 0904)  Ventilator Initiated: Yes (03/28/19 2001)  Set Rate: 20 bmp (04/03/19 0904)  Vt Set: 520 mL (04/03/19 0904)  Pressure Support: 5 cmH20 (04/02/19 1148)  PEEP/CPAP: 5 cmH20 (04/03/19 0904)  Oxygen Concentration (%): 60 (04/03/19 0904)  Peak Airway  Pressure: 24 cmH2O (04/03/19 0904)  Total Ve: 15.4 mL (04/03/19 0904)  F/VT Ratio<105 (RSBI): (!) 43.79 (04/03/19 0904)    Lines/Drains/Airways     Drain                 NG/OG Tube 03/29/19 0346 orogastric 18 Fr. Right mouth 5 days         Urethral Catheter 04/01/19 2200 Straight-tip 16 Fr. 1 day          Airway                 Airway - Non-Surgical 03/28/19 1835 Endotracheal Tube 5 days          Peripheral Intravenous Line                 Peripheral IV - Single Lumen 03/28/19 1839 Left Hand 5 days         Peripheral IV - Single Lumen 04/01/19 1645 Right Antecubital 1 day                Significant Labs:    CBC/Anemia Profile:  Recent Labs   Lab 04/02/19 0451 04/03/19  0446   WBC 14.38* 13.67*   HGB 12.1* 12.5*   HCT 38.0* 40.1    210   MCV 88 88   RDW 15.8* 15.3*        Chemistries:  Recent Labs   Lab 04/02/19 0451 04/03/19  0446   * 145   K 5.2* 5.8*   * 111*   CO2 25 26   BUN 39* 42*   CREATININE 1.0 1.0   CALCIUM 8.9 9.3   ALBUMIN 2.7* 2.8*   PROT 6.2 6.7   BILITOT 0.4 0.6   ALKPHOS 58 60   ALT 54* 43   AST 82* 54*   MG 3.2* 2.9*   PHOS 3.2 4.1       All pertinent labs within the past 24 hours have been reviewed.    Significant Imaging:  I have reviewed all pertinent imaging results/findings within the past 24 hours.    Assessment/Plan:     Active Diagnoses:    Diagnosis Date Noted POA    PRINCIPAL PROBLEM:  Cardiac arrest [I46.9] 03/28/2019 Yes    Palliative care encounter [Z51.5] 04/02/2019 Not Applicable    Counseling regarding advanced care planning and goals of care [Z71.89] 04/02/2019 Not Applicable    Hypernatremia [E87.0] 04/01/2019 No    Cerebral edema due to anoxia [G93.6, R09.02] 04/01/2019 Yes    Goals of care, counseling/discussion [Z71.89] 04/01/2019 Not Applicable    Upper GI bleeding [K92.2] 03/30/2019 Yes    Abnormal liver enzymes [R74.8] 03/29/2019 Yes    Leukocytosis [D72.829] 03/29/2019 Yes    Ischemic hepatitis [K75.9] 03/28/2019 Yes    Acute respiratory  failure with hypoxia and hypercarbia [J96.01, J96.02] 03/28/2019 Yes    Anoxic brain injury [G93.1] 03/28/2019 Yes    Myoclonic jerking [G25.3] 03/28/2019 Yes    Essential hypertension [I10] 12/13/2015 Yes     Chronic    Chronic obstructive pulmonary disease with acute exacerbation [J44.1] 12/13/2015 Yes    Chronic obstructive pulmonary disease [J44.9] 12/02/2014 Yes     Chronic    Cigarette nicotine dependence without complication [F17.210] 12/02/2014 Yes     Chronic      Problems Resolved During this Admission:    Diagnosis Date Noted Date Resolved POA    Acute tubular necrosis [N17.0] 03/28/2019 04/02/2019 Yes    Lactic acidosis [E87.2] 03/28/2019 04/01/2019 Yes    Hyperkalemia, diminished renal excretion [E87.5] 03/28/2019 03/30/2019 Yes    Non-traumatic rhabdomyolysis [M62.82] 03/28/2019 04/02/2019 Yes       1) Acute hypoxemic/hypercarbic respiratory failure and PEA Arrest: s/p rewarming done 3/30. Pt does have pupil restriction, + gag, is breathing over the vent. Although thought brain dead, CT head done 3/31 showed diffuse cerebral edema. Neurologic status hasn't changed from day to day  - continue mechanical ventilation at current settings  - currently off precedex for agitation. Ok to use this for agitation, however pt currently resting comfortably.  - pt needs daily SAT and SBT  - today pt had increasing oxygen requirements and his right lung volume is very diminished compared to his left. Differential includes mucous plug, PE, pneumothorax or shift of ET tube to left main stem. His peak/plateau pressures however remain normal. Will get CXR today for further evaluation.    2) HFpEF  - off pressors  - pt on amlodipine 10mg  - can give metoprolol 5mg prn for SBP >170. We do want his BP on the higher end to help with cerebral perfusion.    3) COPD  - continue duonebs q4hr  - continue budesonide neb for now  - recommend stopping steroids today. Pt has received 5 days of steroids. Not currently in an  exacerbation    4) concern for anoxic brain injury   - CT head on 3/31 showing diffuse cerebral edema  - EEG showed comatose state  - Pt's EEG from 3/29 showed severe encephalopathy but no epileptiform discharges. Keppra can cloud neurologic picture as it's sedating. Repeat EEG saw his twitching however there were no neurologic activity to correlate with that. Thus no epileptic movements seen. Thus stopping keppra as it is sedating medication and we want to see if pt wakes up at all.    5) Upper GI bleed: Pt hasn't bleed in 48 hours. Hgb never dropped during admission.  - continue PPI BID  - would restart lovenox as he is high risk of DVT/PE. On SCDs  - will restart tube feeds today    6) Acute kidney injury: improving. Likely from ATN. Pt now going through diuretic phase of ATN with -2L out in 24hrs  - would suggest watching urine output and would recommend giving him fluids to keep him net even if he continues to auto-diurese.  - watch electrolytes closely and replace as needed    7) Hyperkalemia: unsure etiology behind why. No EKG changes. EICU gave pt everything last night for his hyperkalemia (kayexelate, insulin/dextrose, calcium gluconate, albuterol)  - getting labs q4h to watch electrolytes       Martín Osorio MD  Critical Care - Medicine  [facility]  [phone]

## 2019-04-03 NOTE — EICU
K 5.8, Cr stable   - will order 1 g Calcium gluconate, 10 U regular Insulin + D50 1 amp, cont albuterol nebs 1 hour, Kayexalate 45 g once   - recheck K later

## 2019-04-03 NOTE — PLAN OF CARE
Problem: Adult Inpatient Plan of Care  Goal: Plan of Care Review  Outcome: Ongoing (interventions implemented as appropriate)  Patient on  with documented settings. Alarms are set and functioning with adequate volumes. AMBU bag and mask at bedside. Will continue to monitor.

## 2019-04-03 NOTE — PROGRESS NOTES
Tn noted MD placed LTAC consult ; Tn sent referral to [facility] via [facility].      915- Tn received call from [facility] at [facility] informing TN she has received referral and will follow case. Pt would need trach placed before being able to transfer to LTAC per [facility]

## 2019-04-04 PROBLEM — R06.6 HICCUPS: Status: ACTIVE | Noted: 2019-01-01

## 2019-04-04 NOTE — ASSESSMENT & PLAN NOTE
Takes hydrochlorothiazide, amlodipine at home. Continue home amlodipine. Add metoprolol due to uncontrolled hypertension with tachycardia.

## 2019-04-04 NOTE — PROGRESS NOTES
"  Palliative Medicine spoke with daughter via phone and she stated, "I don't think my daddy would want to live like this." Palliative Medicine also spoke with brother and he said the patient would not want to be trach/PEG. "But we are not God and his spirit is still in his body. God will determine when to take him." Family having dynamic issues with patient fiancee.     Palliative Medicine to meet with family on tomorrow for 9am to discuss GOC. Family reached to Palliative Medicine to discuss trach/PEG for patient. Family has unrealistic goals about patient. Contact information was given.       Vanessa Antonio, MSN, APRN, NP-C   Palliative Medicine   [facility]  [phone] or [phone]        >50% of 60 min visit spent in chart review, face to face discussion of goals of care with patient, family, symptom assessment, coordination of care and emotional support.    "

## 2019-04-04 NOTE — PROGRESS NOTES
[facility] Medicine  Progress Note    Patient Name: Abhijit Marie  MRN: [id number]  Patient Class: IP- Inpatient   Admission Date: 3/28/2019  Length of Stay: 7 days  Attending Physician: Chai Mann MD  Primary Care Provider: Primary Doctor No        Subjective:     Principal Problem:Cardiac arrest    HPI:  Abhijit Marie is a 63 y.o. black man with hypertension, history of cigarette smoking (quit 9/28/18), and chronic obstructive pulmonary disease with history of ICU admission for exacerbation. He lives in [place], [place]. He has a fiancee with whom he has a 15 month old daughter named Shruthi. He works for [employer].   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 His oxygen saturation was 90% with tachypnea and blood pressure of 224/128. He was given albuterol, ipratropium, methylprednisolone, and hydralazine and discharged home after feeling somewhat better. He still had shortness of breath. When he went to work, he told his fiancee that he was having hot sweats. When he returned home, he felt weak. His fiancee went out to get him something to eat and drink around 16:30. When his fizinae returned home, she found him on the floor unresponsive. She could barely feel his pulse. EMS was called and found him to have pulseless electrical activity, and CPR was initiated at 17:56 pm. Pulse was regained at 18:11 pm. He had 4 doses of epinephrine and 1 dose of sodium bicarbonate given via interosseous line, and 100 cc of saliva/vomitues was suctioned from his airway. He was intubated. In the emergency department, he was found to have lactic acidosis, hypercapnia, acute kidney injury, acute hepatic injury, rhabdomyolysis. He had no neurologic responses. He was admitted to [facility]. He displayed myoclonic activity.     [facility]  "Course:  He was put on therapeutic hypothermia and reached goal temperature of < 33°  C around 04:00 on 3/29/19. He was maintained at that temperature for 24 hours prior to starting the rewarming process. EEG showed "a burst suppressed background with generalized background suppressions lasting up to 5 min ... consistent with a severe encephalopathy" with no epileptiform activity, consistent with anoxic brain injury. Repeat CT head showed "diffuse cerebral edema noting loss of gray-white matter differentiation and progressive loss of sulcation." EEG on 4/1/19 showed that the "overall degree of suppression with minimal variability and lack of reactivity is suggestive of a severe encephalopathy with guarded prognosis" with "no evidence of an epileptic process on this recording. Episodes of head jerking were not associated with any abnormality on EEG." Propofol was discontinued and he was started on dexmedetomidine for agitation to avoid the respiratory and neurologic suppression. Respiratory viral panel was positive for Enterovirus and Rhinovirus. Respiratory culture had no growth. Head CT showed diffuse cerebral edema so he was put on dexamethasone for a few days.    Interval History: Spoke to daughter. Discussed option of either hospice, which would be preferred due to his lack of quality of life, or PEG and tracheostomy and continue living in a comatose state.     Review of Systems   Unable to perform ROS: Patient unresponsive     Objective:     Vital Signs (Most Recent):  Temp: 98.4 °F (36.9 °C) (04/04/19 0709)  Pulse: 100 (04/04/19 1102)  Resp: (!) 34 (04/04/19 1102)  BP: (!) 175/97(metoprolol given ) (04/04/19 1050)  SpO2: (!) 93 % (04/04/19 1102) Vital Signs (24h Range):  Temp:  [97.1 °F (36.2 °C)-98.4 °F (36.9 °C)] 98.4 °F (36.9 °C)  Pulse:  [] 100  Resp:  [17-34] 34  SpO2:  [91 %-95 %] 93 %  BP: (134-181)/() 175/97     Weight: 94.8 kg (208 lb 15.9 oz)  Body mass index is 25.44 " kg/m².    Intake/Output Summary (Last 24 hours) at 4/4/2019 1128  Last data filed at 4/4/2019 0900  Gross per 24 hour   Intake 1435 ml   Output 1960 ml   Net -525 ml      Physical Exam   Constitutional: He appears well-developed. No distress. He is intubated.   Cardiovascular: Regular rhythm. Tachycardia present.   Pulmonary/Chest: He is intubated. He has rhonchi.   Abdominal: Soft. There is no tenderness.   Neurological: He is unresponsive. He displays no tremor.   Nursing note and vitals reviewed.      Significant Labs: All pertinent labs within the past 24 hours have been reviewed.    Significant Imaging: I have reviewed all pertinent imaging results/findings within the past 24 hours.   X-Ray Chest AP Portable 4/04/19: FINDINGS:  Heart normal.  Atelectasis peribronchial opacities vague infiltrate retrocardiac left lung base with elevation left diaphragm and partial obscuring left diaphragm.  Interstitial markings are otherwise remain generally prominent.  Tube support devices stable satisfactory.      Assessment/Plan:      * Cardiac arrest  Anoxic brain injury  Cerebral edema due to anoxia  Myoclonic jerking  Leukocytosis  Had hypothermia protocol. Continue supportive care. Dexamethasone stopped. Will need tracheostomy prior to transfer to LTAC if family would prefer this over hospice.    Hiccups  Could be related to anoxic brain injury. Give baclofen 5 mg TID.    Goals of care, counseling/discussion  Spoke with daughter, Nilay, this afternoon about current status. He still has corneal reflex and is breathing over the ventilator, so does not meet brain death at this time. Told her that his current state is likely to be the best he will be going forward, and that he would require 24 hour care with trach and PEG in a facility. She does not think that he would have wanted that. She is going to talk with her sister and brother and her dad's siblings as well about this. Will consult Palliative Care for continued  assistance with goals of care discussions and likely transition to hospice for terminal extubation.     Essential hypertension  Takes hydrochlorothiazide, amlodipine at home. Continue home amlodipine. Add metoprolol due to uncontrolled hypertension with tachycardia.    Cigarette nicotine dependence without complication  Quit last year.    Chronic obstructive pulmonary disease  Chronic obstructive pulmonary disease with acute exacerbation  Acute respiratory failure with hypoxia and hypercarbia  Enterovirus and Rhinovirus rhinosinusitis  Uses albuterol nebulizer at home. Giving albuterol-ipratropium every 4 hours. Started on budesonide nebulizer treatments. Ventilator management. Appreciate Pulmonary assistance. WBC count higher, could be due to dexamethasone, but check repeat respiratory culture and procalcitonin to evaluate for postviral pneumonia considering left retrocardiac opacity.    VTE Risk Mitigation (From admission, onward)        Ordered     enoxaparin injection 40 mg  Daily      04/03/19 0934     IP VTE HIGH RISK PATIENT  Once      03/28/19 2229     Place sequential compression device  Until discontinued      03/28/19 1943          Critical care time spent on the evaluation and treatment of severe organ dysfunction, review of pertinent labs and imaging studies, discussions with consulting providers and discussions with patient/family: 40 minutes.    Chai Mann MD  Department of Hospital Medicine   [facility]

## 2019-04-04 NOTE — ASSESSMENT & PLAN NOTE
Anoxic brain injury  Cerebral edema due to anoxia  Myoclonic jerking  Leukocytosis  Had hypothermia protocol. Still comatose. Palliative Care had family discussion. Will withdraw life support later today.

## 2019-04-04 NOTE — PLAN OF CARE
Pt noted to still have ~200mL residual from OG tube. Pt still having hiccups and still having tube feed come out of his mouth. ARYAN Finney notified, states to hold tube feed until morning. Will continue to monitor.

## 2019-04-04 NOTE — PLAN OF CARE
Problem: Adult Inpatient Plan of Care  Goal: Plan of Care Review  No significant events this shift. Pt unresponsive to stimuli. Remains tachycardic; BP controlled with PRN medications. Tube feedings restarted at 10ml/hr. Urine output adequate. Repositioned frequently. Safety precautions in place. Pt remains free from fall/injury. Plan of care reviewed with patient and family. Palliative care discussion scheduled for tomorrow morning.

## 2019-04-04 NOTE — ASSESSMENT & PLAN NOTE
Anoxic brain injury  Cerebral edema due to anoxia  Myoclonic jerking  Leukocytosis  Had hypothermia protocol. Continue supportive care. Giving dexamethasone for cerebral edema. Will need tracheostomy prior to transfer to LTAC if family would prefer this over hospice.

## 2019-04-04 NOTE — SUBJECTIVE & OBJECTIVE
Interval History: Spoke to daughter. Discussed option of either hospice, which would be preferred due to his lack of quality of life, or PEG and tracheostomy and continue living in a comatose state.     Review of Systems   Unable to perform ROS: Patient unresponsive     Objective:     Vital Signs (Most Recent):  Temp: 98.4 °F (36.9 °C) (04/04/19 0709)  Pulse: 100 (04/04/19 1102)  Resp: (!) 34 (04/04/19 1102)  BP: (!) 175/97(metoprolol given ) (04/04/19 1050)  SpO2: (!) 93 % (04/04/19 1102) Vital Signs (24h Range):  Temp:  [97.1 °F (36.2 °C)-98.4 °F (36.9 °C)] 98.4 °F (36.9 °C)  Pulse:  [] 100  Resp:  [17-34] 34  SpO2:  [91 %-95 %] 93 %  BP: (134-181)/() 175/97     Weight: 94.8 kg (208 lb 15.9 oz)  Body mass index is 25.44 kg/m².    Intake/Output Summary (Last 24 hours) at 4/4/2019 1128  Last data filed at 4/4/2019 0900  Gross per 24 hour   Intake 1435 ml   Output 1960 ml   Net -525 ml      Physical Exam   Constitutional: He appears well-developed. No distress. He is intubated.   Cardiovascular: Regular rhythm. Tachycardia present.   Pulmonary/Chest: He is intubated. He has rhonchi.   Abdominal: Soft. There is no tenderness.   Neurological: He is unresponsive. He displays no tremor.   Nursing note and vitals reviewed.      Significant Labs: All pertinent labs within the past 24 hours have been reviewed.    Significant Imaging: I have reviewed all pertinent imaging results/findings within the past 24 hours.   X-Ray Chest AP Portable 4/04/19: FINDINGS:  Heart normal.  Atelectasis peribronchial opacities vague infiltrate retrocardiac left lung base with elevation left diaphragm and partial obscuring left diaphragm.  Interstitial markings are otherwise remain generally prominent.  Tube support devices stable satisfactory.

## 2019-04-04 NOTE — PROGRESS NOTES
[facility]  Critical Care - Medicine  Progress Note    Patient Name: Abhijit Marie  MRN: [id number]  Admission Date: 3/28/2019  Hospital Length of Stay: 7 days  Code Status: Full Code  Attending Provider: Chai Mann MD  Primary Care Provider: Primary Doctor No   Principal Problem: Cardiac arrest    Summary: 63 male with HTN, HFpEF, COPD, who is currently admitted to the MICU following a hypoxemic/hypercarbic PEA arrest that was unwitnessed.  He was last normal 3/28 at 4:30pm and CPR was started at 5:56PM the same day.  Exact downtime is unknown.  He was admitted to the MICU and underwent cooling protocol, and is currently being rewarmed.  He is on keppra for anti-epileptic therapy (did have EEG which was negative for active seizures).  He is having evidence of probable myclonus activity manifested by facial twitching.  He is on propofol for sedation. He initially had a respiratory acidosis that has since corrected with mechanical ventilation.  He is not responding to noxious stimuli.  He completed rewarming the AM of 3/30.Since rewarming, pt's CT head 4/1 showed diffuse cerebral edema. EEG was done 4/1 which showed comatose state, diffuse suppression with minimal cortical activity. Pt does have corneal reflex, coughs and breaths over the vent but doesn't withdraw from pain and no purposeful movements.    Subjective:     Interval history:  Pt had no acute events overnight. His neuro status remains unchanged despite being off all sedative medications. Pt was having high residuals from his tube feeds and they were coming out orally when suctioned.     Review of Systems   Unable to perform ROS: Intubated   Constitutional: Negative for activity change, appetite change, chills, diaphoresis and fatigue.   HENT: Negative.    Eyes: Negative.    Respiratory: Negative.    Cardiovascular: Negative.    Gastrointestinal: Negative.    Genitourinary: Negative.      Objective:     Vital Signs (Most  Recent):  Temp: 98.4 °F (36.9 °C) (04/04/19 0709)  Pulse: (!) 118 (04/04/19 0800)  Resp: (!) 24 (04/04/19 0800)  BP: (!) 162/99 (04/04/19 0905)  SpO2: (!) 91 % (04/04/19 0800) Vital Signs (24h Range):  Temp:  [97.1 °F (36.2 °C)-98.4 °F (36.9 °C)] 98.4 °F (36.9 °C)  Pulse:  [] 118  Resp:  [17-31] 24  SpO2:  [89 %-95 %] 91 %  BP: (121-181)/() 162/99     Weight: 94.8 kg (208 lb 15.9 oz)  Body mass index is 25.44 kg/m².      Intake/Output Summary (Last 24 hours) at 4/4/2019 0917  Last data filed at 4/4/2019 0800  Gross per 24 hour   Intake 1555.42 ml   Output 1955 ml   Net -399.58 ml       Physical Exam   Constitutional: He appears well-developed and well-nourished.   Appears younger than age. Off sedation, calm.   HENT:   Head: Normocephalic and atraumatic.   Mouth/Throat: No oropharyngeal exudate.   Eyes: No scleral icterus.   Pupils 3 mm. But equal, round, reactive to light.   Neck: Normal range of motion. Neck supple. No JVD present.   Cardiovascular: Normal rate, regular rhythm, normal heart sounds and intact distal pulses. Exam reveals no gallop and no friction rub.   No murmur heard.  Pulmonary/Chest: No respiratory distress. He has no wheezes. He has no rales.   Mechanical ventilation. There is significantly diminished lung volumes on the right compared to the left.   Abdominal: Soft. Bowel sounds are normal. He exhibits no distension.   Lymphadenopathy:     He has no cervical adenopathy.   Neurological:   Off sedation. + pupil constriction, + cough. Doesn't withdraw to pain. No purposeful movements.     Skin: Skin is warm and dry. Capillary refill takes less than 2 seconds.       Vents:  Vent Mode: A/C (04/04/19 0700)  Ventilator Initiated: Yes (03/28/19 2001)  Set Rate: 20 bmp (04/04/19 0700)  Vt Set: 520 mL (04/04/19 0700)  Pressure Support: 5 cmH20 (04/02/19 1148)  PEEP/CPAP: 5 cmH20 (04/04/19 0700)  Oxygen Concentration (%): 60 (04/04/19 0700)  Peak Airway Pressure: 27 cmH2O (04/04/19  0700)  Total Ve: 13.3 mL (04/04/19 0700)  F/VT Ratio<105 (RSBI): (!) 45.45 (04/04/19 0700)    Lines/Drains/Airways     Drain                 NG/OG Tube 03/29/19 0346 orogastric 18 Fr. Right mouth 6 days         Urethral Catheter 04/01/19 2200 Straight-tip 16 Fr. 2 days          Airway                 Airway - Non-Surgical 03/28/19 1835 Endotracheal Tube 6 days          Peripheral Intravenous Line                 Peripheral IV - Single Lumen 03/28/19 1839 Left Hand 6 days         Peripheral IV - Single Lumen 04/01/19 1645 Right Antecubital 2 days                Significant Labs:    CBC/Anemia Profile:  Recent Labs   Lab 04/03/19  0446 04/04/19  0410   WBC 13.67* 19.81*   HGB 12.5* 13.7*   HCT 40.1 43.0    212   MCV 88 89   RDW 15.3* 15.3*        Chemistries:  Recent Labs   Lab 04/03/19  0446  04/03/19  1210 04/03/19  1417 04/04/19  0410      < > 145 145 146*   K 5.8*   < > 4.7  4.7  4.8 4.6  4.6 4.4   *   < > 110 111* 111*   CO2 26   < > 27 26 28   BUN 42*   < > 41* 37* 32*   CREATININE 1.0   < > 0.9 0.9 0.8   CALCIUM 9.3   < > 9.6 9.7 9.6   ALBUMIN 2.8*  --   --   --  2.6*   PROT 6.7  --   --   --  6.8   BILITOT 0.6  --   --   --  0.6   ALKPHOS 60  --   --   --  96   ALT 43  --   --   --  166*   AST 54*  --   --   --  99*   MG 2.9*  --   --   --  2.2   PHOS 4.1  --   --   --  3.0    < > = values in this interval not displayed.       All pertinent labs within the past 24 hours have been reviewed.    Significant Imaging:  I have reviewed all pertinent imaging results/findings within the past 24 hours.    Assessment/Plan:     Active Diagnoses:    Diagnosis Date Noted POA    PRINCIPAL PROBLEM:  Cardiac arrest [I46.9] 03/28/2019 Yes    Palliative care encounter [Z51.5] 04/02/2019 Not Applicable    Counseling regarding advanced care planning and goals of care [Z71.89] 04/02/2019 Not Applicable    Cerebral edema due to anoxia [G93.6, R09.02] 04/01/2019 Yes    Goals of care, counseling/discussion  [Z71.89] 04/01/2019 Not Applicable    Leukocytosis [D72.829] 03/29/2019 Yes    Acute respiratory failure with hypoxia and hypercarbia [J96.01, J96.02] 03/28/2019 Yes    Anoxic brain injury [G93.1] 03/28/2019 Yes    Myoclonic jerking [G25.3] 03/28/2019 Yes    Essential hypertension [I10] 12/13/2015 Yes     Chronic    Chronic obstructive pulmonary disease with acute exacerbation [J44.1] 12/13/2015 Yes    Chronic obstructive pulmonary disease [J44.9] 12/02/2014 Yes     Chronic    Cigarette nicotine dependence without complication [F17.210] 12/02/2014 Yes     Chronic      Problems Resolved During this Admission:    Diagnosis Date Noted Date Resolved POA    Hypernatremia [E87.0] 04/01/2019 04/03/2019 No    Upper GI bleeding [K92.2] 03/30/2019 04/03/2019 Yes    Abnormal liver enzymes [R74.8] 03/29/2019 04/03/2019 Yes    Acute tubular necrosis [N17.0] 03/28/2019 04/02/2019 Yes    Ischemic hepatitis [K75.9] 03/28/2019 04/03/2019 Yes    Lactic acidosis [E87.2] 03/28/2019 04/01/2019 Yes    Hyperkalemia, diminished renal excretion [E87.5] 03/28/2019 03/30/2019 Yes    Non-traumatic rhabdomyolysis [M62.82] 03/28/2019 04/02/2019 Yes     # Anoxic brain injury: Pt does have pupil restriction, he does cough, is breathing over the vent. Although not brain dead, CT head done 3/31 showed diffuse cerebral edema. Neurologic status hasn't changed from day to day. Pt getting dexamethasone 10mg q6h for his cerebral edema per primary team however not good data shown to help that. Steroids can be problematic. Will stop high dose steroids.  - EEG showed comatose state  - Pt's EEG from 3/29 showed severe encephalopathy but no epileptiform discharges. Keppra can cloud neurologic picture as it's sedating. Repeat EEG saw his twitching however there were no neurologic activity to correlate with that. Thus no epileptic movements seen. Thus keppra was stopped.    # Acute hypoxemic/hypercarbic respiratory failure and PEA Arrest: s/p  rewarming done 3/30. Pt had increased oxygen requirements with current FiO2 at 60%. Differential includes VAP vs PE vs atelectasis  - continue mechanical ventilation at current settings  - currently off precedex for agitation. Ok to use this for agitation, however pt currently resting comfortably.  - pt needs daily SAT and SBT  - Will get CXR today for better idea if he's starting to develop a VAP.    # HFpEF  - off pressors  - pt on amlodipine 10mg  - can give metoprolol 5mg prn for SBP >170. We do want his BP on the higher end to help with cerebral perfusion.    # COPD  - continue duonebs q4hr  - continue budesonide neb for now    # leukocytosis: Likely related to high dose of steroids given. No fevers or signs of infection. Pt is at risk for VAP. CXR yesterday showing slight opacities in RLL and LLL.  - would continue to monitor. Getting CXR today for better visualization.  - viral panel + for rhinovirus and enterovirus     # Upper GI bleed: Pt hasn't bleed in >3 days. Hgb never dropped during admission.  - continue PPI BID  - On lovenox/SCDs for prophylaxis  - will restart tube feeds today but keep at trickle.           Martín Osorio MD  Critical Care - Medicine  [facility]  [phone]

## 2019-04-04 NOTE — PLAN OF CARE
Tube feed noted coming out of pt's mouth, pump turned off. Residual checked, ~200 mL noted. Will hold feed for now and recheck residual in 4 hours per ARYAN Finney. Pt having what appears to be hiccups which could be causing reflux of gastric contents. Will continue to monitor.

## 2019-04-04 NOTE — TELEPHONE ENCOUNTER
----- Message from Karrie Magallon sent at 2019  2:39 PM CDT -----  Contact: Hilda @ [facility] [phone]  CONSULTS:     Patient: Abhijit Marie    : 1955    Clinic#: [id number]    Room number:  259    Referring MD: Dr. Mann    Diagnosis: cardiac arrest, anoxic brain injury    Person calling: Hilda @ [facility] [phone]

## 2019-04-04 NOTE — PLAN OF CARE
Problem: Adult Inpatient Plan of Care  Goal: Plan of Care Review  Outcome: Ongoing (interventions implemented as appropriate)  Patient on  with documented settings.  Alarms are set and functioning with adequate volumes.  AMBU bag and mask at bedside.The proper method of use, as well as anticipated side effects, of this aerosol treatment are discussed and demonstrated to the patient. Will continue to monitor.

## 2019-04-04 NOTE — ASSESSMENT & PLAN NOTE
Chronic obstructive pulmonary disease with acute exacerbation  Acute respiratory failure with hypoxia and hypercarbia  Enterovirus and Rhinovirus rhinosinusitis  Uses albuterol nebulizer at home. Giving albuterol-ipratropium every 4 hours. Started on budesonide nebulizer treatments. Ventilator management. Appreciate Pulmonary assistance. WBC count higher, could be due to dexamethasone, but check repeat respiratory culture and procalcitonin to evaluate for postviral pneumonia considering left retrocardiac opacity.

## 2019-04-05 PROBLEM — G93.1 ANOXIC BRAIN INJURY: Status: RESOLVED | Noted: 2019-01-01 | Resolved: 2019-01-01

## 2019-04-05 PROBLEM — R06.6 HICCUPS: Status: RESOLVED | Noted: 2019-01-01 | Resolved: 2019-01-01

## 2019-04-05 PROBLEM — Z71.89 GOALS OF CARE, COUNSELING/DISCUSSION: Status: RESOLVED | Noted: 2019-01-01 | Resolved: 2019-01-01

## 2019-04-05 PROBLEM — R09.02 CEREBRAL EDEMA DUE TO ANOXIA: Status: RESOLVED | Noted: 2019-01-01 | Resolved: 2019-01-01

## 2019-04-05 PROBLEM — Z51.5 PALLIATIVE CARE ENCOUNTER: Status: RESOLVED | Noted: 2019-01-01 | Resolved: 2019-01-01

## 2019-04-05 PROBLEM — Z71.89 COUNSELING REGARDING ADVANCED CARE PLANNING AND GOALS OF CARE: Status: RESOLVED | Noted: 2019-01-01 | Resolved: 2019-01-01

## 2019-04-05 PROBLEM — I46.9 CARDIAC ARREST: Status: RESOLVED | Noted: 2019-01-01 | Resolved: 2019-01-01

## 2019-04-05 PROBLEM — J96.02 ACUTE RESPIRATORY FAILURE WITH HYPOXIA AND HYPERCARBIA: Status: RESOLVED | Noted: 2019-01-01 | Resolved: 2019-01-01

## 2019-04-05 PROBLEM — G93.6 CEREBRAL EDEMA DUE TO ANOXIA: Status: RESOLVED | Noted: 2019-01-01 | Resolved: 2019-01-01

## 2019-04-05 PROBLEM — G25.3 MYOCLONIC JERKING: Status: RESOLVED | Noted: 2019-01-01 | Resolved: 2019-01-01

## 2019-04-05 PROBLEM — J96.00 RESPIRATORY FAILURE, ACUTE: Status: RESOLVED | Noted: 2019-01-01 | Resolved: 2019-01-01

## 2019-04-05 PROBLEM — I51.89 DIASTOLIC DYSFUNCTION: Chronic | Status: RESOLVED | Noted: 2018-01-01 | Resolved: 2019-01-01

## 2019-04-05 PROBLEM — J96.01 ACUTE RESPIRATORY FAILURE WITH HYPOXIA AND HYPERCARBIA: Status: RESOLVED | Noted: 2019-01-01 | Resolved: 2019-01-01

## 2019-04-05 PROBLEM — J96.00 RESPIRATORY FAILURE, ACUTE: Status: ACTIVE | Noted: 2019-01-01

## 2019-04-05 PROBLEM — D72.829 LEUKOCYTOSIS: Status: RESOLVED | Noted: 2019-01-01 | Resolved: 2019-01-01

## 2019-04-05 NOTE — SUBJECTIVE & OBJECTIVE
Interval History: Family decision for terminal extubation  Medications:  Continuous Infusions:   morphine 10 mg/hr (04/05/19 1753)     Scheduled Meds:   scopolamine  1 patch Transdermal Q3 Days     PRN Meds:lorazepam, ondansetron    Objective:     Vital Signs (Most Recent):  Pulse: 72 (04/05/19 1830)  Resp: 14 (04/05/19 1830)  BP: (!) 73/42 (04/05/19 1830)  SpO2: (!) 65 % (04/05/19 1830) Vital Signs (24h Range):  Temp:  [98.8 °F (37.1 °C)-99.2 °F (37.3 °C)] 99 °F (37.2 °C)  Pulse:  [] 72  Resp:  [14-31] 14  SpO2:  [65 %-100 %] 65 %  BP: ()/() 73/42        There is no height or weight on file to calculate BMI.    Review of Symptoms  Symptom Assessment (ESAS 0-10 scale)  ESAS 0 1 2 3 4 5 6 7 8 9 10   Pain              Dyspnea              Anxiety              Nausea              Depression               Anorexia              Fatigue              Insomnia              Restlessness               Agitation              CAM / Delirium __ --  ___+   Constipation     __ --  ___+   Diarrhea           __ --  ___+      Physical Exam   Constitutional: He appears well-developed. He is intubated.   Pulmonary/Chest: He is intubated.   Neurological: He is unresponsive.       Significant Labs: None  CBC:   Recent Labs   Lab 04/05/19  0333   WBC 21.18*   HGB 13.0*   HCT 42.5   MCV 91        BMP:  Recent Labs   Lab 04/05/19 0333   *   *   K 4.7      CO2 32*   BUN 42*   CREATININE 1.0   CALCIUM 9.5   MG 2.5     LFT:  Lab Results   Component Value Date    AST 39 04/05/2019    ALKPHOS 107 04/05/2019    BILITOT 0.4 04/05/2019     Albumin:   Albumin   Date Value Ref Range Status   04/05/2019 2.3 (L) 3.5 - 5.2 g/dL Final     Protein:   Total Protein   Date Value Ref Range Status   04/05/2019 6.6 6.0 - 8.4 g/dL Final     Lactic acid:   Lab Results   Component Value Date    LACTATE 3.6 (HH) 03/30/2019    LACTATE 7.2 (HH) 03/29/2019       Significant Imaging: None    Advanced Directives::  Living  Will: No  LaPOST: Yes  Do Not Resuscitate Status: Yes  Medical Power of : No    Decision-Making Capacity: Family answered questions, Patient unable to communicate due to disease severity/cognitive impairment    Living Arrangements: Lives with family    Psychosocial/Cultural:  Patient's most important priorities:  none    Patient's biggest concerns/fears:  none    Previous death/end of life care history:  none    Patient's goals/hopes:  None     Spiritual:     F- Isabelle and Belief: Unable to assess    I - Importance: Unable to assess      .  C - Community: Unable to assess        A - Address in Care:Unable to assess      Recommendations:  Continue medical treatment  Terminal Extubation   Comfort Care  Consult [facility]  Consult Spiritual Care  Code status: DNR  Morphine for pain and dyspnea  Ativan for agitation.      Thank you for the  opportunity to participate in Mr. Marie's  care.       Vanessa Antonio, MSN, APRN, NP-C   Palliative Medicine   [facility]  [phone] or [phone]        >50% of 60  min visit spent in chart review, face to face discussion of goals of care with patient, family, symptom assessment, coordination of care and emotional support.

## 2019-04-05 NOTE — PROGRESS NOTES
[facility]  Critical Care - Medicine  Progress Note    Patient Name: Abhijit Marie  MRN: [id number]  Admission Date: 3/28/2019  Hospital Length of Stay: 8 days  Code Status: Full Code  Attending Provider: Chai Mann MD  Primary Care Provider: Primary Doctor No   Principal Problem: Cardiac arrest    Summary: 63 male with HTN, HFpEF, COPD, who is currently admitted to the MICU following a hypoxemic/hypercarbic PEA arrest that was unwitnessed.  He was last normal 3/28 at 4:30pm and CPR was started at 5:56PM the same day.  Exact downtime is unknown.  He was admitted to the MICU and underwent cooling protocol, and is currently being rewarmed.  He is on keppra for anti-epileptic therapy (did have EEG which was negative for active seizures).  He is having evidence of probable myclonus activity manifested by facial twitching.  He is on propofol for sedation. He initially had a respiratory acidosis that has since corrected with mechanical ventilation.  He is not responding to noxious stimuli.  He completed rewarming the AM of 3/30.Since rewarming, pt's CT head 4/1 showed diffuse cerebral edema. EEG was done 4/1 which showed comatose state, diffuse suppression with minimal cortical activity.   Subjective:     Interval history:  This morning pt had no corneal reflex, is apneic when placed on SBT, no oculo-cephalic reflex, no gag reflex, not withdrawing from pain. Pt is likely brain dead however needs apnea test to confirm this. Family however decided today to withdraw care at 3pm.    Review of Systems   Unable to perform ROS: Intubated   Constitutional: Negative for activity change, appetite change, chills, diaphoresis and fatigue.   HENT: Negative.    Eyes: Negative.    Respiratory: Negative.    Cardiovascular: Negative.    Gastrointestinal: Negative.    Genitourinary: Negative.      Objective:     Vital Signs (Most Recent):  Temp: 99 °F (37.2 °C) (04/05/19 1130)  Pulse: 108 (04/05/19 1313)  Resp: (!)  21 (04/05/19 1313)  BP: (!) 164/94 (04/05/19 1300)  SpO2: 100 % (04/05/19 1313) Vital Signs (24h Range):  Temp:  [98.8 °F (37.1 °C)-99.2 °F (37.3 °C)] 99 °F (37.2 °C)  Pulse:  [104-123] 108  Resp:  [20-33] 21  SpO2:  [89 %-100 %] 100 %  BP: (123-186)/() 164/94     Weight: 94.8 kg (208 lb 15.9 oz)  Body mass index is 25.44 kg/m².      Intake/Output Summary (Last 24 hours) at 4/5/2019 1324  Last data filed at 4/5/2019 1300  Gross per 24 hour   Intake 180 ml   Output 2005 ml   Net -1825 ml       Physical Exam   Constitutional: He appears well-developed and well-nourished.   Appears younger than age. Off sedation, calm.   HENT:   Head: Normocephalic and atraumatic.   Mouth/Throat: No oropharyngeal exudate.   Eyes: No scleral icterus.   Pupils 3 mm. No reactive to light.   Neck: Normal range of motion. Neck supple. No JVD present.   Cardiovascular: Normal rate, regular rhythm, normal heart sounds and intact distal pulses. Exam reveals no gallop and no friction rub.   No murmur heard.  Pulmonary/Chest: No respiratory distress. He has no wheezes. He has no rales.   Mechanical ventilation. There is significantly diminished lung volumes on the right compared to the left.   Abdominal: Soft. Bowel sounds are normal. He exhibits no distension.   Lymphadenopathy:     He has no cervical adenopathy.   Neurological:   Off sedation. No pupil constriction. No gag. Negative doll's eye test. No purposeful movement. No spontaneous breathing.     Skin: Skin is warm and dry. Capillary refill takes less than 2 seconds.       Vents:  Vent Mode: A/C (04/05/19 1313)  Ventilator Initiated: Yes (03/28/19 2001)  Set Rate: 20 bmp (04/05/19 1313)  Vt Set: 520 mL (04/05/19 1313)  Pressure Support: 5 cmH20 (04/04/19 1506)  PEEP/CPAP: 5 cmH20 (04/05/19 1313)  Oxygen Concentration (%): 60 (04/05/19 1313)  Peak Airway Pressure: 34 cmH2O (04/05/19 1313)  Total Ve: 11.3 mL (04/05/19 1313)  F/VT Ratio<105 (RSBI): (!) 36.21 (04/05/19  1313)    Lines/Drains/Airways     Drain                 NG/OG Tube 03/29/19 0346 orogastric 18 Fr. Right mouth 7 days         Urethral Catheter 04/01/19 2200 Straight-tip 16 Fr. 3 days          Airway                 Airway - Non-Surgical 03/28/19 1835 Endotracheal Tube 7 days          Peripheral Intravenous Line                 Peripheral IV - Single Lumen 03/28/19 1839 Left Hand 7 days         Peripheral IV - Single Lumen 04/01/19 1645 Right Antecubital 3 days                Significant Labs:    CBC/Anemia Profile:  Recent Labs   Lab 04/04/19  0410 04/05/19  0333   WBC 19.81* 21.18*   HGB 13.7* 13.0*   HCT 43.0 42.5    249   MCV 89 91   RDW 15.3* 15.6*        Chemistries:  Recent Labs   Lab 04/03/19  1417 04/04/19  0410 04/05/19  0333    146* 147*   K 4.6  4.6 4.4 4.7   * 111* 110   CO2 26 28 32*   BUN 37* 32* 42*   CREATININE 0.9 0.8 1.0   CALCIUM 9.7 9.6 9.5   ALBUMIN  --  2.6* 2.3*   PROT  --  6.8 6.6   BILITOT  --  0.6 0.4   ALKPHOS  --  96 107   ALT  --  166* 99*   AST  --  99* 39   MG  --  2.2 2.5   PHOS  --  3.0 3.3       All pertinent labs within the past 24 hours have been reviewed.    Significant Imaging:  I have reviewed all pertinent imaging results/findings within the past 24 hours.    Assessment/Plan:     Active Diagnoses:    Diagnosis Date Noted POA    PRINCIPAL PROBLEM:  Cardiac arrest [I46.9] 03/28/2019 Yes    Hiccups [R06.6] 04/04/2019 No    Palliative care encounter [Z51.5] 04/02/2019 Not Applicable    Counseling regarding advanced care planning and goals of care [Z71.89] 04/02/2019 Not Applicable    Cerebral edema due to anoxia [G93.6, R09.02] 04/01/2019 Yes    Goals of care, counseling/discussion [Z71.89] 04/01/2019 Not Applicable    Leukocytosis [D72.829] 03/29/2019 Yes    Acute respiratory failure with hypoxia and hypercarbia [J96.01, J96.02] 03/28/2019 Yes    Anoxic brain injury [G93.1] 03/28/2019 Yes    Myoclonic jerking [G25.3] 03/28/2019 Yes    Essential  hypertension [I10] 12/13/2015 Yes     Chronic    Chronic obstructive pulmonary disease with acute exacerbation [J44.1] 12/13/2015 Yes    Chronic obstructive pulmonary disease [J44.9] 12/02/2014 Yes     Chronic    Cigarette nicotine dependence without complication [F17.210] 12/02/2014 Yes     Chronic      Problems Resolved During this Admission:    Diagnosis Date Noted Date Resolved POA    Hypernatremia [E87.0] 04/01/2019 04/03/2019 No    Upper GI bleeding [K92.2] 03/30/2019 04/03/2019 Yes    Abnormal liver enzymes [R74.8] 03/29/2019 04/03/2019 Yes    Acute tubular necrosis [N17.0] 03/28/2019 04/02/2019 Yes    Ischemic hepatitis [K75.9] 03/28/2019 04/03/2019 Yes    Lactic acidosis [E87.2] 03/28/2019 04/01/2019 Yes    Hyperkalemia, diminished renal excretion [E87.5] 03/28/2019 03/30/2019 Yes    Non-traumatic rhabdomyolysis [M62.82] 03/28/2019 04/02/2019 Yes     # Anoxic brain injury: Pt likely brain dead as he lacks all the neurologic reflexes that he had a few days ago. Additionally, pt is apneic today when placed on SBT  - can do apnea test to confirm brain dead however family has decided to terminally extubate today at 3pm, thus apnea test not needed.       Martín Osorio MD  Critical Care - Medicine  [facility]  [phone]

## 2019-04-05 NOTE — PLAN OF CARE
Problem: Adult Inpatient Plan of Care  Goal: Plan of Care Review  Vital signs stable. Unresponsive to stimuli; no longer breathing over vent. Tube feedings continued. Urine output adequate. Repositioned frequently. Safety precautions in place. Pt remains free from fall/injury. Palliative care discussion with family this AM. Will transition to hospice/comfort care this afternoon.

## 2019-04-05 NOTE — NURSING
Pt transitioned to hospice care. Family at bedside. Support provided. All questions answered. Spiritual care resources discussed. Premedicated per order for extubation. Extubated to 3L NC with RN, RT, and ARYAN Mendez at bedside.

## 2019-04-05 NOTE — DISCHARGE SUMMARY
[facility]  Discharge Summary      Patient Name: Abhijit Marie  MRN: [id number]  Admission Date: 3/28/2019  Hospital Length of Stay: 8 days  Discharge Date and Time:  04/05/2019 4:18 PM  Attending Physician: Chai Mann MD   Discharging Provider: Chai Mann MD  Primary Care Provider: Primary Doctor No      HPI:   Abhijit Marie is a 63 y.o. black man with hypertension, history of cigarette smoking (quit 9/28/18), and chronic obstructive pulmonary disease with history of ICU admission for exacerbation. He lives in [place], [place]. He has a fiancee with whom he has a 15 month old daughter named Shruthi. He works for [employer].   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 His oxygen saturation was 90% with tachypnea and blood pressure of 224/128. He was given albuterol, ipratropium, methylprednisolone, and hydralazine and discharged home after feeling somewhat better. He still had shortness of breath. When he went to work, he told his fiancee that he was having hot sweats. When he returned home, he felt weak. His fiancee went out to get him something to eat and drink around 16:30. When his fiancee returned home, she found him on the floor unresponsive. She could barely feel his pulse. EMS was called and found him to have pulseless electrical activity, and CPR was initiated at 17:56 pm. Pulse was regained at 18:11 pm. He had 4 doses of epinephrine and 1 dose of sodium bicarbonate given via interosseous line, and 100 cc of saliva/vomitues was suctioned from his airway. He was intubated. In the emergency department, he was found to have lactic acidosis, hypercapnia, acute kidney injury, acute hepatic injury, rhabdomyolysis. He had no neurologic responses. He was admitted to [facility]. He displayed myoclonic  "activity.     * No surgery found *      Hospital Course:   He was put on therapeutic hypothermia and reached goal temperature of < 33°  C around 04:00 on 3/29/19. He was maintained at that temperature for 24 hours prior to starting the rewarming process. EEG showed "a burst suppressed background with generalized background suppressions lasting up to 5 min ... consistent with a severe encephalopathy" with no epileptiform activity, consistent with anoxic brain injury. Repeat CT head showed "diffuse cerebral edema noting loss of gray-white matter differentiation and progressive loss of sulcation." EEG on 4/1/19 showed that the "overall degree of suppression with minimal variability and lack of reactivity is suggestive of a severe encephalopathy with guarded prognosis" with "no evidence of an epileptic process on this recording. Episodes of head jerking were not associated with any abnormality on EEG." Propofol was discontinued and he was started on dexmedetomidine for agitation to avoid the respiratory and neurologic suppression. Respiratory viral panel was positive for Enterovirus and Rhinovirus. Respiratory culture had no growth. Head CT showed diffuse cerebral edema so he was put on dexamethasone for a few days. Palliative Care had a family discussion on 4/5/19 and it was decided that life support would be withdrawn. He was transferred to inpatient hospice.     Consults:   Consults (From admission, onward)        Status Ordering Provider     Inpatient consult to LTAC  Once     Provider:  (Not yet assigned)    Completed ROBIN ORTIZ     Inpatient consult to Palliative Care  Once     Provider:  Faby Marcano MD    Completed NORAH OROZCO     Inpatient consult to Pulmonology  Once     Provider:  Martín Osorio MD    Completed ROBIN ORTIZ     Inpatient consult to Social Work  Once     Provider:  (Not yet assigned)    Ordered MEGHAN GOMEZ            Final Active Diagnoses:    Diagnosis " Date Noted POA    PRINCIPAL PROBLEM:  Cardiac arrest [I46.9] 03/28/2019 Yes    Hiccups [R06.6] 04/04/2019 No    Palliative care encounter [Z51.5] 04/02/2019 Not Applicable    Counseling regarding advanced care planning and goals of care [Z71.89] 04/02/2019 Not Applicable    Cerebral edema due to anoxia [G93.6, R09.02] 04/01/2019 Yes    Goals of care, counseling/discussion [Z71.89] 04/01/2019 Not Applicable    Leukocytosis [D72.829] 03/29/2019 Yes    Acute respiratory failure with hypoxia and hypercarbia [J96.01, J96.02] 03/28/2019 Yes    Anoxic brain injury [G93.1] 03/28/2019 Yes    Myoclonic jerking [G25.3] 03/28/2019 Yes    Essential hypertension [I10] 12/13/2015 Yes     Chronic    Chronic obstructive pulmonary disease with acute exacerbation [J44.1] 12/13/2015 Yes    Chronic obstructive pulmonary disease [J44.9] 12/02/2014 Yes     Chronic    Cigarette nicotine dependence without complication [F17.210] 12/02/2014 Yes     Chronic      Problems Resolved During this Admission:    Diagnosis Date Noted Date Resolved POA    Hypernatremia [E87.0] 04/01/2019 04/03/2019 No    Upper GI bleeding [K92.2] 03/30/2019 04/03/2019 Yes    Abnormal liver enzymes [R74.8] 03/29/2019 04/03/2019 Yes    Acute tubular necrosis [N17.0] 03/28/2019 04/02/2019 Yes    Ischemic hepatitis [K75.9] 03/28/2019 04/03/2019 Yes    Lactic acidosis [E87.2] 03/28/2019 04/01/2019 Yes    Hyperkalemia, diminished renal excretion [E87.5] 03/28/2019 03/30/2019 Yes    Non-traumatic rhabdomyolysis [M62.82] 03/28/2019 04/02/2019 Yes       Discharged Condition: poor    Disposition: Hospice/Medical Facility     Follow Up: Hospice    Patient Instructions:   No discharge procedures on file.    Significant Diagnostic Studies:   X-Ray Chest 1 View 3/28/19: FINDINGS:  The endotracheal tube tip projects in the midthoracic trachea.  Lungs are clear.  No left effusion.  The right costophrenic angle is excluded from the field of view.  No  pneumothorax.  No acute bone abnormality.  CT Head Without Contrast 3/28/19:  FINDINGS:  Gray-white interface is preserved.  No midline shift or mass effect.  No acute infarct or hemorrhage.  No subdural collection.  The ventricles and basal cisterns appear normal.  There is moderate fluid filling of the paranasal sinuses which could be related to hemorrhage or other fluid.  The zygomatic arches and nasal bones and maxillary spines appear intact.  There is periapical lucency of the right mandibular canine is with associated caries involving tooth number 21 and 22.  Impression:  No infarct or acute hemorrhage or fracture.  Partial fluid filling of the paranasal sinuses may related to epistaxis, other fluid.  Poor dentition evident.  CT Cervical Spine Without Contrast 3/28/19:   FINDINGS:  There is motion degradation.  Endotracheal tube and nasogastric tubes are in place.  The nasogastric tube shows a coil in the hypopharynx.  No fracture or subluxation seen.  Motion degradation is most significant through the region of the dens.  The atlanto dens interval appears normal.  Lateral masses appear intact.  No facet fracture or malalignment.  No endplate erosion.  No compression deformity.  There is a chronic Schmorl node of the C7 superior endplate.  No skin edema or defect.  No foreign body.  There is a mild broad disc bulges at C3-4 and C4-5.  Lung apices show mild paraseptal emphysema.  No pneumothorax or pleural effusion.  Visualized mastoid sinuses and middle ears appear normal.  Impression:   Motion degradation with degenerative changes.  No fracture or subluxation found.  Nasogastric tube is looped in the hypopharynx.  TRANSTHORACIC ECHO (TTE) COMPLETE 3/29/19:  · Mild concentric left ventricular hypertrophy.  · Small left ventricular cavity size.  · Normal left ventricular systolic function. The estimated ejection fraction is 55%  · Grade I (mild) left ventricular diastolic dysfunction consistent with impaired  relaxation.  · Elevated left atrial pressure.  · Normal right ventricular systolic function.  · Intermediate central venous pressure (8 mm Hg).  · The estimated PA systolic pressure is 28 mm Hg  Electroencephalogram 3/29/19:   Impression: This is an abnormal routine EEG because of a burst suppressed background with generalized background suppressions lasting up to 5 min.  This is consistent with a severe encephalopathy.  This finding is nonspecific with regards to etiology but can be seen in the setting of toxic/metabolic derangements, infection, as a medication effect, and in anoxia.  There are no epileptiform discharges and no electrographic seizures.  Electroencephalogram 4/01/19:  EEG FINDINGS:  The recording was obtained with a number of standard bipolar and referential montages during comatose state.  In this state, the background was diffusely suppressed and comprised of minimal cortical activity.  Spontaneous variability was noted.  Reactivity was absent.  No clear state changes were appreciated.  There were no interictal epileptiform abnormalities and no clinical or electrographic seizure recorded.   The EKG channel revealed sinus rhythm.   IMPRESSION:  This is an abnormal EEG during comatose state.  Diffuse suppression of the background with minimal cortical activity was noted.  CT Head Without Contrast 4/01/19: FINDINGS:  There is diffuse loss of gray-white matter differentiation and progressive loss of sulcation suggesting diffuse cerebral edema.  No CT findings to suggest an acute major vascular distribution infarct.  No hydrocephalus.  No midline shift or mass effect.  No intracranial hemorrhage.  No abnormal intra or extra-axial fluid collections.  Air-fluid levels noted throughout the paranasal sinuses, nonspecific in the setting of support devices.  There is irregularity involving the left medial orbital wall, likely related to previous trauma.  Mastoids are clear.  No acute osseous abnormalities.   Globes are symmetric.  Subcutaneous soft tissues are within normal limits.  Impression:   1. CT findings consistent with diffuse cerebral edema noting loss of gray-white matter differentiation and progressive loss of sulcation.  2. Diffuse air-fluid levels throughout the paranasal sinuses, nonspecific in the setting of support devices.  X-Ray Chest AP Portable 4/04/19: FINDINGS:  Heart normal.  Atelectasis peribronchial opacities vague infiltrate retrocardiac left lung base with elevation left diaphragm and partial obscuring left diaphragm.  Interstitial markings are otherwise remain generally prominent.  Tube support devices stable satisfactory.     Medications:  Transfer Medications (for Discharge Readmit only):   Current Facility-Administered Medications   Medication Dose Route Frequency Provider Last Rate Last Dose    acetaminophen suppository 650 mg  650 mg Rectal Q6H PRN Chai Mann MD   650 mg at 04/02/19 1316    albuterol-ipratropium 2.5 mg-0.5 mg/3 mL nebulizer solution 3 mL  3 mL Nebulization Q4H Fernando Perez MD   3 mL at 04/05/19 1109    amLODIPine tablet 10 mg  10 mg Per OG tube Daily Rogelio Grant MD   10 mg at 04/05/19 0845    artificial tears 0.5 % ophthalmic solution 1 drop  1 drop Both Eyes PRN Rogelio Grant MD   1 drop at 03/31/19 1511    baclofen tablet 5 mg  5 mg Per OG tube TID Chai Mann MD   5 mg at 04/05/19 1452    budesonide nebulizer solution 0.5 mg  0.5 mg Nebulization Q12H Fernando Perez MD   0.5 mg at 04/05/19 0737    chlorhexidine 0.12 % solution 15 mL  15 mL Mouth/Throat BID Chai Mann MD   15 mL at 04/05/19 0845    cloNIDine tablet 0.1 mg  0.1 mg Per OG tube Q4H PRN Chai Mann MD        dexmedetomidine (PRECEDEX) 400mcg/100mL dextrose 5% infusion  0.2 mcg/kg/hr Intravenous Continuous Martín Osorio MD   Stopped at 04/03/19 0713    dextrose 50 % in water (D50W) injection 12.5 g  12.5 g Intravenous PRN Chai Mann MD        dextrose  50 % in water (D50W) injection 25 g  25 g Intravenous PRN Chai Mann MD   25 g at 04/03/19 0704    dextrose 50 % in water (D50W) injection 25 g  25 g Intravenous PRN Jordy Caceres MD        enoxaparin injection 40 mg  40 mg Subcutaneous Daily Martín Osorio MD   40 mg at 04/04/19 1708    glucagon (human recombinant) injection 1 mg  1 mg Intramuscular PRN Chai Mann MD        glucose chewable tablet 16 g  16 g Oral PRN Chai Mann MD        glucose chewable tablet 24 g  24 g Oral PRN Chai Mann MD        hydrALAZINE injection 10 mg  10 mg Intravenous Q6H PRN Shanel Donato PA-C   10 mg at 04/04/19 1210    lorazepam (ATIVAN) injection 2 mg  2 mg Intravenous Q2H PRN Chai Mann MD   2 mg at 03/28/19 2038    metoprolol injection 5 mg  5 mg Intravenous Q4H PRN Martín Osorio MD   5 mg at 04/04/19 1528    metoprolol tartrate (LOPRESSOR) tablet 25 mg  25 mg Per OG tube BID Chai Mann MD   25 mg at 04/05/19 0844    morphine injection 2 mg  2 mg Intravenous Q4H PRN Chai Mann MD   2 mg at 03/28/19 2244    ondansetron injection 4 mg  4 mg Intravenous Q8H PRN Chai Mann MD        pantoprazole injection 40 mg  40 mg Intravenous BID Rogelio Grant MD   40 mg at 04/05/19 0845    sodium chloride 0.9% flush 10 mL  10 mL Intravenous PRN Chai Mann MD           Indwelling Lines/Drains at time of discharge:   Lines/Drains/Airways     Drain                 NG/OG Tube 03/29/19 0346 orogastric 18 Fr. Right mouth 7 days         Urethral Catheter 04/01/19 2200 Straight-tip 16 Fr. 3 days          Airway                 Airway - Non-Surgical 03/28/19 1835 Endotracheal Tube 7 days                Time spent on the discharge of patient: 40 minutes  Patient was seen and examined on the date of discharge and determined to be suitable for discharge.    Chai Mann MD  Department of Hospital Medicine  [facility]

## 2019-04-05 NOTE — ASSESSMENT & PLAN NOTE
Terminal Extubation  Consult [facility]  Consult spiritual Care  Bereavement Tray  Morphine for dyspnea and pain   Ativan for agitation  Continuous oxygen for dyspnea  scopolamine patch  Perkins cath

## 2019-04-05 NOTE — PLAN OF CARE
Palliative Medicine Progress Notes:   Family decision to have terminal extubation at 3pm      04/05/19 1303   Discharge Reassessment   Assessment Type Discharge Planning Reassessment

## 2019-04-05 NOTE — PROGRESS NOTES
Palliative Medicine Progress Notes      Family decision to have terminal extubation at 3pm       Vanessa Antonio, MSN, APRN, NP-C   Palliative Medicine   [facility]  [phone] or [phone]          >50% of  120 min visit spent in chart review, face to face discussion of goals of care with patient, family, symptom assessment, coordination of care and emotional support.

## 2019-04-05 NOTE — PLAN OF CARE
Problem: Adult Inpatient Plan of Care  Goal: Plan of Care Review  Outcome: Ongoing (interventions implemented as appropriate)  Patient's neurological status noted to have slight changes tonight. Patient does open eyes spontaneously but does not follow voice, does not follow commands, and does not track. Patient still not withdrawing from pain and remains free from gag and cough reflex as well as corneal reflex. VSS tonight but remains tachycardic, improved with nighttime dose of Lopressor. Tube feeding formula still being suctioned in mouth during oral care and found in oral secretions. Residuals are low. Perkins in place with good urine output. Remains afebrile. Oxygenation unchanged on vent, still breathing above vent on AC settings. No family/visitors tonight. Plan for palliative care meeting today with family. Will cont to monitor.

## 2019-04-05 NOTE — SUBJECTIVE & OBJECTIVE
Interval History: See Palliative Care note.    Review of Systems   Unable to perform ROS: Patient unresponsive     Objective:     Vital Signs (Most Recent):  Temp: 99 °F (37.2 °C) (04/05/19 1130)  Pulse: 110 (04/05/19 1200)  Resp: 20 (04/05/19 1200)  BP: (!) 140/98 (04/05/19 1200)  SpO2: (!) 93 % (04/05/19 1200) Vital Signs (24h Range):  Temp:  [98.8 °F (37.1 °C)-99.2 °F (37.3 °C)] 99 °F (37.2 °C)  Pulse:  [104-123] 110  Resp:  [20-33] 20  SpO2:  [89 %-96 %] 93 %  BP: (123-186)/() 140/98     Weight: 94.8 kg (208 lb 15.9 oz)  Body mass index is 25.44 kg/m².    Intake/Output Summary (Last 24 hours) at 4/5/2019 1250  Last data filed at 4/5/2019 1000  Gross per 24 hour   Intake 180 ml   Output 1955 ml   Net -1775 ml      Physical Exam   Constitutional: He appears well-developed. No distress. He is intubated.   Cardiovascular: Regular rhythm. Tachycardia present.   Pulmonary/Chest: He is intubated. He has rhonchi.   Abdominal: Soft. There is no tenderness.   Neurological: He is unresponsive. He displays no tremor.   Nursing note and vitals reviewed.      Significant Labs: All pertinent labs within the past 24 hours have been reviewed.    Significant Imaging: I have reviewed all pertinent imaging results/findings within the past 24 hours.

## 2019-04-05 NOTE — PROGRESS NOTES
[facility]  Progress Note    Patient Name: Abhijit Marie  MRN: [id number]  Patient Class: IP- Inpatient   Admission Date: 3/28/2019  Length of Stay: 8 days  Attending Physician: Chai Mann MD  Primary Care Provider: Primary Doctor No        Subjective:     Principal Problem:Cardiac arrest    HPI:  Abhijit Marie is a 63 y.o. black man with hypertension, history of cigarette smoking (quit 9/28/18), and chronic obstructive pulmonary disease with history of ICU admission for exacerbation. He lives in [place], [place]. He has a fiancee with whom he has a 15 month old daughter named Shruthi. He works for [employer].   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 His oxygen saturation was 90% with tachypnea and blood pressure of 224/128. He was given albuterol, ipratropium, methylprednisolone, and hydralazine and discharged home after feeling somewhat better. He still had shortness of breath. When he went to work, he told his fiancee that he was having hot sweats. When he returned home, he felt weak. His fiancee went out to get him something to eat and drink around 16:30. When his fizinae returned home, she found him on the floor unresponsive. She could barely feel his pulse. EMS was called and found him to have pulseless electrical activity, and CPR was initiated at 17:56 pm. Pulse was regained at 18:11 pm. He had 4 doses of epinephrine and 1 dose of sodium bicarbonate given via interosseous line, and 100 cc of saliva/vomitues was suctioned from his airway. He was intubated. In the emergency department, he was found to have lactic acidosis, hypercapnia, acute kidney injury, acute hepatic injury, rhabdomyolysis. He had no neurologic responses. He was admitted to [facility]. He displayed myoclonic activity.     [facility]  "Course:  He was put on therapeutic hypothermia and reached goal temperature of < 33°  C around 04:00 on 3/29/19. He was maintained at that temperature for 24 hours prior to starting the rewarming process. EEG showed "a burst suppressed background with generalized background suppressions lasting up to 5 min ... consistent with a severe encephalopathy" with no epileptiform activity, consistent with anoxic brain injury. Repeat CT head showed "diffuse cerebral edema noting loss of gray-white matter differentiation and progressive loss of sulcation." EEG on 4/1/19 showed that the "overall degree of suppression with minimal variability and lack of reactivity is suggestive of a severe encephalopathy with guarded prognosis" with "no evidence of an epileptic process on this recording. Episodes of head jerking were not associated with any abnormality on EEG." Propofol was discontinued and he was started on dexmedetomidine for agitation to avoid the respiratory and neurologic suppression. Respiratory viral panel was positive for Enterovirus and Rhinovirus. Respiratory culture had no growth. Head CT showed diffuse cerebral edema so he was put on dexamethasone for a few days. Palliative Care had a family discussion on 4/5/19 and it was decided that life support would be withdrawn.     Interval History: See Palliative Care note.    Review of Systems   Unable to perform ROS: Patient unresponsive     Objective:     Vital Signs (Most Recent):  Temp: 99 °F (37.2 °C) (04/05/19 1130)  Pulse: 110 (04/05/19 1200)  Resp: 20 (04/05/19 1200)  BP: (!) 140/98 (04/05/19 1200)  SpO2: (!) 93 % (04/05/19 1200) Vital Signs (24h Range):  Temp:  [98.8 °F (37.1 °C)-99.2 °F (37.3 °C)] 99 °F (37.2 °C)  Pulse:  [104-123] 110  Resp:  [20-33] 20  SpO2:  [89 %-96 %] 93 %  BP: (123-186)/() 140/98     Weight: 94.8 kg (208 lb 15.9 oz)  Body mass index is 25.44 kg/m².    Intake/Output Summary (Last 24 hours) at 4/5/2019 1250  Last data filed at 4/5/2019 " 1000  Gross per 24 hour   Intake 180 ml   Output 1955 ml   Net -1775 ml      Physical Exam   Constitutional: He appears well-developed. No distress. He is intubated.   Cardiovascular: Regular rhythm. Tachycardia present.   Pulmonary/Chest: He is intubated. He has rhonchi.   Abdominal: Soft. There is no tenderness.   Neurological: He is unresponsive. He displays no tremor.   Nursing note and vitals reviewed.      Significant Labs: All pertinent labs within the past 24 hours have been reviewed.    Significant Imaging: I have reviewed all pertinent imaging results/findings within the past 24 hours.       Assessment/Plan:      * Cardiac arrest  Anoxic brain injury  Cerebral edema due to anoxia  Myoclonic jerking  Leukocytosis  Had hypothermia protocol. Still comatose. Palliative Care had family discussion. Will withdraw life support later today.    Hiccups  Could be related to anoxic brain injury. Give baclofen 5 mg TID.    Goals of care, counseling/discussion  Spoke with daughter, Nilay, this afternoon about current status. He still has corneal reflex and is breathing over the ventilator, so does not meet brain death at this time. Told her that his current state is likely to be the best he will be going forward, and that he would require 24 hour care with trach and PEG in a facility. She does not think that he would have wanted that. She is going to talk with her sister and brother and her dad's siblings as well about this. Will consult Palliative Care for continued assistance with goals of care discussions and likely transition to hospice for terminal extubation.     Essential hypertension  Takes hydrochlorothiazide, amlodipine at home. Continue home amlodipine. Add metoprolol due to uncontrolled hypertension with tachycardia.    Cigarette nicotine dependence without complication  Quit last year.    Chronic obstructive pulmonary disease  Chronic obstructive pulmonary disease with acute exacerbation  Acute  respiratory failure with hypoxia and hypercarbia  Enterovirus and Rhinovirus rhinosinusitis  Uses albuterol nebulizer at home. Giving albuterol-ipratropium every 4 hours. Started on budesonide nebulizer treatments. Ventilator management. Appreciate Pulmonary assistance. WBC count higher, could be due to dexamethasone, but check repeat respiratory culture and procalcitonin to evaluate for postviral pneumonia considering left retrocardiac opacity.      VTE Risk Mitigation (From admission, onward)        Ordered     enoxaparin injection 40 mg  Daily      04/03/19 0934     IP VTE HIGH RISK PATIENT  Once      03/28/19 2229     Place sequential compression device  Until discontinued      03/28/19 1943          Critical care time spent on the evaluation and treatment of severe organ dysfunction, review of pertinent labs and imaging studies, discussions with consulting providers and discussions with patient/family: 35 minutes.    Chai Mann MD  Department of Hospital Medicine   [facility]

## 2019-04-05 NOTE — PROGRESS NOTES
Noted terminal extubation planned for 3:00 today. Will sign-off. Please re-consult if needed.    Vera Underwood, RD, LDN

## 2019-04-06 NOTE — SUBJECTIVE & OBJECTIVE
Called that patient has no spontaneous breathing. Reported no HR, pupil fixed and dilated. Pronounced on 4/5/2019 @ 1939

## 2019-04-06 NOTE — DISCHARGE SUMMARY
[facility]  Discharge Summary      Admit Date: 4/5/2019    Discharge Date and Time: 4/5/2019 @ 1939      Attending Physician: Faby Marcano*     Reason for Admission: Comfort Measure    Procedures Performed: * No surgery found *    Hospital Course (synopsis of major diagnoses, care, treatment, and services provided during the course of the hospital stay):        Abhijit Marie is a 63 y.o. black man with hypertension, history of cigarette smoking (quit 9/28/18), and chronic obstructive pulmonary disease with history of ICU admission for exacerbation. He lives in [place], [place]. He has a fiancee with whom he has a 15 month old daughter named Shruthi. He works for [employer].              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 His oxygen saturation was 90% with tachypnea and blood pressure of 224/128. He was given albuterol, ipratropium, methylprednisolone, and hydralazine and discharged home after feeling somewhat better. He still had shortness of breath. When he went to work, he told his fiancee that he was having hot sweats. When he returned home, he felt weak. His fiancee went out to get him something to eat and drink around 16:30. When his fiancee returned home, she found him on the floor unresponsive. She could barely feel his pulse. EMS was called and found him to have pulseless electrical activity, and CPR was initiated at 17:56 pm. Pulse was regained at 18:11 pm. He had 4 doses of epinephrine and 1 dose of sodium bicarbonate given via interosseous line, and 100 cc of saliva/vomitues was suctioned from his airway. He was intubated. In the emergency department, he was found to have lactic acidosis, hypercapnia, acute kidney injury, acute hepatic injury, rhabdomyolysis. He had no neurologic responses. [facility] was [facility] to [facility]. He displayed myoclonic activity.      * No surgery found *       Hospital Course:   He was put on therapeutic hypothermia and reached goal temperature of < 33°  C around 04:00 on 3/29/19. He was maintained at that temperature for 24 hours prior to starting the rewarming process. EEG showed "a burst suppressed background with generalized background suppressions lasting up to 5 min ... consistent with a severe encephalopathy" with no epileptiform activity, consistent with anoxic brain injury. Repeat CT head showed "diffuse cerebral edema noting loss of gray-white matter differentiation and progressive loss of sulcation." EEG on 4/1/19 showed that the "overall degree of suppression with minimal variability and lack of reactivity is suggestive of a severe encephalopathy with guarded prognosis" with "no evidence of an epileptic process on this recording. Episodes of head jerking were not associated with any abnormality on EEG." Propofol was discontinued and he was started on dexmedetomidine for agitation to avoid the respiratory and neurologic suppression. Respiratory viral panel was positive for Enterovirus and Rhinovirus. Respiratory culture had no growth. Head CT showed diffuse cerebral edema so he was put on dexamethasone for a few days.     Palliative medicine met with family on today. Family had spoken with Pulmonary Critical about patient current diagnosis/prognosis. Patient is intubated and unreponsive. He is not a candiate for trach/PEG. Patient not able to breath on his own and most of his reflexes are gone. Discussed with family comfort care and hospice care. Family decided, "Abhijit has suffered enough. He was a good brother and he had so much life in him." Terminal extubation to take place this evening. Patient to unstable for transfer to another facility. [facility]. Consult spiritual care.       Called that patient " has no spontaneous breathing. Reported no HR, pupil fixed and dilated. Pronounced on 2019 @ 1939    Consults: none    Significant Diagnostic Studies:     Final Diagnoses:    Principal Problem: <principal problem not specified>   Secondary Diagnoses:   Active Hospital Problems   No active problems to display.      Resolved Hospital Problems    Diagnosis Date Resolved POA    Respiratory failure, acute [J96.00] 2019 Yes    Palliative care encounter [Z51.5] 2019 Not Applicable       Discharged Condition:     Disposition:     Medications:  None (patient  at medical facility)  No discharge procedures on file.     Faby Marcano MD  Palliative Medicine  [facility]

## 2019-04-06 NOTE — H&P
[facility]  Palliative Medicine  History and Physical      Patient Name: Abhijit Marie  MRN: [id number]  Admission Date: 4/5/2019  Hospital Length of Stay: 0 days  Code Status: DNR   Attending Provider: Faby Marcano*  Consulting Provider: Vanessa Antonio NP  Primary Care Physician: Primary Doctor No  Principal Problem:<principal problem not specified>     Patient information was obtained from relative(s) and ER records.       Assessment/Plan:      Respiratory failure, acute  Terminal extubation  Continuous Oxygen  Morphine for dyspnea     Palliative care encounter  Terminal Extubation  Consult [facility]  Consult spiritual Care  Bereavement Tray  Morphine for dyspnea and pain   Ativan for agitation  Continuous oxygen for dyspnea  scopolamine patch  Perkins cath           I will follow-up with patient. Please contact us if you have any additional questions.     Subjective:      Chief Complaint: No chief complaint on file.        HPI:   Abhijit Marie is a 63 y.o. black man with hypertension, history of cigarette smoking (quit 9/28/18), and chronic obstructive pulmonary disease with history of ICU admission for exacerbation. He lives in [place], [place]. He has a fiancee with whom he has a 15 month old daughter named Shruthi. He works for [employer].              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 His oxygen saturation was 90% with tachypnea and blood pressure of 224/128. He was given albuterol, ipratropium, methylprednisolone, and hydralazine and discharged home after feeling somewhat better. He still had shortness of breath. When he went to work, he told his fiancee that he was having hot sweats. When he returned home, he felt weak. His fiancee went out to get him something to eat and drink around 16:30. When his fiancee returned  "home, she found him on the floor unresponsive. She could barely feel his pulse. EMS was called and found him to have pulseless electrical activity, and CPR was initiated at 17:56 pm. Pulse was regained at 18:11 pm. He had 4 doses of epinephrine and 1 dose of sodium bicarbonate given via interosseous line, and 100 cc of saliva/vomitues was suctioned from his airway. He was intubated. In the emergency department, he was found to have lactic acidosis, hypercapnia, acute kidney injury, acute hepatic injury, rhabdomyolysis. He had no neurologic responses. He was admitted to [facility]. He displayed myoclonic activity.      * No surgery found *       Hospital Course:   He was put on therapeutic hypothermia and reached goal temperature of < 33°  C around 04:00 on 3/29/19. He was maintained at that temperature for 24 hours prior to starting the rewarming process. EEG showed "a burst suppressed background with generalized background suppressions lasting up to 5 min ... consistent with a severe encephalopathy" with no epileptiform activity, consistent with anoxic brain injury. Repeat CT head showed "diffuse cerebral edema noting loss of gray-white matter differentiation and progressive loss of sulcation." EEG on 4/1/19 showed that the "overall degree of suppression with minimal variability and lack of reactivity is suggestive of a severe encephalopathy with guarded prognosis" with "no evidence of an epileptic process on this recording. Episodes of head jerking were not associated with any abnormality on EEG." Propofol was discontinued and he was started on dexmedetomidine for agitation to avoid the respiratory and neurologic suppression. Respiratory viral panel was positive for Enterovirus and Rhinovirus. Respiratory culture had no growth. Head CT showed diffuse cerebral edema so he was put on dexamethasone for a few days.      Palliative medicine met with family on today. Family had spoken with Pulmonary " "Critical about patient current diagnosis/prognosis. Patient is intubated and unreponsive. He is not a candiate for trach/PEG. Patient not able to breath on his own and most of his reflexes are gone. Discussed with family comfort care and hospice care. Family decided, "Abhijit has suffered enough. He was a good brother and he had so much life in him." Terminal extubation to take place this evening. Patient to unstable for transfer to another facility. Consult [facility] for comfort care and inpatient hospice at [facility]. Consult spiritual care.         Hospital Course:  No notes on file     Interval History: Family decision for terminal extubation  Medications:  Continuous Infusions:   morphine 10 mg/hr (04/05/19 1753)      Scheduled Meds:   scopolamine  1 patch Transdermal Q3 Days      PRN Meds:lorazepam, ondansetron     Objective:      Vital Signs (Most Recent):  Pulse: 72 (04/05/19 1830)  Resp: 14 (04/05/19 1830)  BP: (!) 73/42 (04/05/19 1830)  SpO2: (!) 65 % (04/05/19 1830) Vital Signs (24h Range):  Temp:  [98.8 °F (37.1 °C)-99.2 °F (37.3 °C)] 99 °F (37.2 °C)  Pulse:  [] 72  Resp:  [14-31] 14  SpO2:  [65 %-100 %] 65 %  BP: ()/() 73/42         There is no height or weight on file to calculate BMI.     Review of Symptoms  Symptom Assessment (ESAS 0-10 scale)  ESAS 0 1 2 3 4 5 6 7 8 9 10   Pain                         Dyspnea                         Anxiety                         Nausea                         Depression                          Anorexia                         Fatigue                         Insomnia                         Restlessness                          Agitation                         CAM / Delirium __ --  ___+   Constipation     __ --  ___+   Diarrhea           __ --  ___+        Physical Exam   Constitutional: He appears well-developed. He is intubated.   Pulmonary/Chest: He is intubated.   Neurological: He is unresponsive.         Significant Labs: " None  CBC:       Recent Labs   Lab 04/05/19  0333   WBC 21.18*   HGB 13.0*   HCT 42.5   MCV 91         BMP:      Recent Labs   Lab 04/05/19 0333   *   *   K 4.7      CO2 32*   BUN 42*   CREATININE 1.0   CALCIUM 9.5   MG 2.5      LFT:        Lab Results   Component Value Date     AST 39 04/05/2019     ALKPHOS 107 04/05/2019     BILITOT 0.4 04/05/2019      Albumin:         Albumin   Date Value Ref Range Status   04/05/2019 2.3 (L) 3.5 - 5.2 g/dL Final      Protein:         Total Protein   Date Value Ref Range Status   04/05/2019 6.6 6.0 - 8.4 g/dL Final      Lactic acid:         Lab Results   Component Value Date     LACTATE 3.6 () 03/30/2019     LACTATE 7.2 () 03/29/2019         Significant Imaging: None     Advanced Directives::  Living Will: No  [place]: Yes  Do Not Resuscitate Status: Yes  Medical Power of : No     Decision-Making Capacity: Family answered questions, Patient unable to communicate due to disease severity/cognitive impairment     Living Arrangements: Lives with family     Psychosocial/Cultural:  Patient's most important priorities:  none     Patient's biggest concerns/fears:  none     Previous death/end of life care history:  none     Patient's goals/hopes:  None      Spiritual:      F- Isabelle and Belief: Unable to assess     I - Importance: Unable to assess        .  C - Community: Unable to assess           A - Address in Care:Unable to assess        Recommendations:  Continue medical treatment  Terminal Extubation   Comfort Care  Consult [facility]  Consult Spiritual Care  Code status: DNR  Morphine for pain and dyspnea  Ativan for agitation.       Thank you for the  opportunity to participate in Mr. Marie's  care.         Vanessa Antonio, MSN, APRN, NP-C   Palliative Medicine   [facility]  [phone] or [phone]          >50% of 60  min visit spent in chart review, face to face discussion of goals of care with patient, family, symptom  assessment, coordination of care and emotional support.

## 2019-04-06 NOTE — NURSING
Asystole noted on monitor. Hospice nurse notified; RN to come to bedside to pronounce time of death. Attempted to notify family; not present at bedside at this time.

## 2019-04-07 LAB
BACTERIA SPEC AEROBE CULT: NORMAL
GRAM STN SPEC: NORMAL
GRAM STN SPEC: NORMAL
